# Patient Record
Sex: MALE | Race: OTHER | NOT HISPANIC OR LATINO | URBAN - METROPOLITAN AREA
[De-identification: names, ages, dates, MRNs, and addresses within clinical notes are randomized per-mention and may not be internally consistent; named-entity substitution may affect disease eponyms.]

---

## 2019-04-27 ENCOUNTER — EMERGENCY (EMERGENCY)
Facility: HOSPITAL | Age: 64
LOS: 1 days | Discharge: HOME | End: 2019-04-27
Attending: EMERGENCY MEDICINE
Payer: MEDICAID

## 2019-04-27 VITALS
RESPIRATION RATE: 16 BRPM | TEMPERATURE: 98 F | HEIGHT: 66 IN | OXYGEN SATURATION: 100 % | DIASTOLIC BLOOD PRESSURE: 91 MMHG | WEIGHT: 199.96 LBS | SYSTOLIC BLOOD PRESSURE: 158 MMHG | HEART RATE: 71 BPM

## 2019-04-27 VITALS
TEMPERATURE: 98 F | OXYGEN SATURATION: 96 % | RESPIRATION RATE: 16 BRPM | SYSTOLIC BLOOD PRESSURE: 144 MMHG | HEART RATE: 81 BPM | DIASTOLIC BLOOD PRESSURE: 87 MMHG

## 2019-04-27 PROCEDURE — 99284 EMERGENCY DEPT VISIT MOD MDM: CPT

## 2019-04-27 PROCEDURE — 99284 EMERGENCY DEPT VISIT MOD MDM: CPT | Mod: 25

## 2019-04-27 PROCEDURE — 96374 THER/PROPH/DIAG INJ IV PUSH: CPT

## 2019-04-27 RX ORDER — SODIUM CHLORIDE 9 MG/ML
1000 INJECTION, SOLUTION INTRAVENOUS ONCE
Qty: 0 | Refills: 0 | Status: COMPLETED | OUTPATIENT
Start: 2019-04-27 | End: 2019-04-27

## 2019-04-27 RX ORDER — METOCLOPRAMIDE HCL 10 MG
10 TABLET ORAL ONCE
Qty: 0 | Refills: 0 | Status: COMPLETED | OUTPATIENT
Start: 2019-04-27 | End: 2019-04-27

## 2019-04-27 RX ORDER — METOCLOPRAMIDE HCL 10 MG
1 TABLET ORAL
Qty: 15 | Refills: 0
Start: 2019-04-27 | End: 2019-05-01

## 2019-04-27 RX ORDER — MECLIZINE HCL 12.5 MG
50 TABLET ORAL ONCE
Qty: 0 | Refills: 0 | Status: COMPLETED | OUTPATIENT
Start: 2019-04-27 | End: 2019-04-27

## 2019-04-27 RX ADMIN — Medication 50 MILLIGRAM(S): at 19:42

## 2019-04-27 RX ADMIN — Medication 10 MILLIGRAM(S): at 19:42

## 2019-04-27 RX ADMIN — SODIUM CHLORIDE 1000 MILLILITER(S): 9 INJECTION, SOLUTION INTRAVENOUS at 19:42

## 2019-04-27 NOTE — ED ADULT NURSE NOTE - OBJECTIVE STATEMENT
64y male arrived to ED complaining of dizziness. Patient is Colombian speaking, prefers to have daughter translate. Patient PMHx vertigo. Patient p/w dizziness x4 hours PTA worsened with movement such as standing up. Patient endorses 1 episode of n/v. Patient A&Ox4, ambulatory, VS stable. Denies CP, SOB, diarrhea, fever, chills, ab pain, headache. Patient well appearing.

## 2019-04-27 NOTE — ED PROVIDER NOTE - OBJECTIVE STATEMENT
64 YOM pmh BPPV, p/w intermittent vertigo with movement started 4 hours ago while pt was bent over and tying his shoes. Pt endorses nausea and vomiting x 1. Pt denies fever, chills, cough, headache, chest pain, abdominal pain. Pt denies any numbness or tingling, any weakness in any extremities. Pt is ambulatory but feels very symptomatic when pt stands up or moves his head.

## 2019-04-27 NOTE — ED PROVIDER NOTE - NSFOLLOWUPCLINICS_GEN_ALL_ED_FT
[facility]  Neurology  [address] [place] [address] [address] [place] [address]  Phone: [phone]  Fax:   Follow Up Time:

## 2019-04-27 NOTE — ED PROVIDER NOTE - NEUROLOGICAL, MLM
Alert and oriented, no focal deficits, no motor or sensory deficits. CN II-XII intact, [place] hallpike positive.

## 2019-04-27 NOTE — ED PROVIDER NOTE - NSFOLLOWUPINSTRUCTIONS_ED_ALL_ED_FT
1. Return to ED for worsening, progressive or any other concerning symptoms   2. Follow up with your primary care doctor in 2-3days  3. Follow up with neurology clinic.   4. Take reglan 3 times a day as needed for vertigo symptoms.  5. Return if you develop any weakness or numbness.

## 2019-04-27 NOTE — ED PROVIDER NOTE - PROGRESS NOTE DETAILS
Addended by: Reynold Olivares on: 3/27/2017 09:12 AM     Modules accepted: Orders
symptoms improved, pt ambulatory will d/c home with neuro follow up.

## 2019-04-27 NOTE — ED PROVIDER NOTE - NS ED ROS FT
CONSTITUTIONAL: No fevers, no chills  Eyes: no visual changes  Ears: no ear drainage, no ear pain  Nose: no nasal congestion  Mouth/Throat: no sore throat  Cardiovascular: No Chest pain  Respiratory: No SOB  Gastrointestinal: No n/v/d, no abd pain  Genitourinary: no dysuria, no hematuria  SKIN: no rashes.  NEURO: +vertigo   PSYCHIATRIC: no known mental health issues.

## 2019-04-27 NOTE — ED PROVIDER NOTE - CLINICAL SUMMARY MEDICAL DECISION MAKING FREE TEXT BOX
Pt with h/o BPV has recurrence had recent neuro eval eith CT head neg Pt with positional vomiting and dizziness otherwise normal exam improved with treatment in ED ambulatory No vomiting will have out patient PMD f/u --Damon

## 2019-04-27 NOTE — ED PROVIDER NOTE - ATTENDING CONTRIBUTION TO CARE
I have seen and evaluated this patient with the resident.   I agree with the findings  unless other wise stated.  I have made appropriate changes in documentations where needed, After my face to face bedside evaluation, I am further  notin YOM pmh BPPV, p/w intermittent vertigo with movement started 4 hours ago while pt was bent over and tying his shoes. Pt endorses nausea and vomiting x 1. Pt denies fever, chills, cough, headache, chest pain, abdominal pain. Pt denies any numbness or tingling, any weakness in any extremities. Pt is ambulatory but feels very symptomatic when pt stands up or moves his head. Pt with h/o BPV has recurrence had recent neuro eval eith CT head neg Pt with positional vomiting and dizziness otherwise normal exam improved with treatment in ED ambulatory No vomiting will have out patient PMD f/u --Damon

## 2019-04-27 NOTE — ED ADULT NURSE NOTE - NSIMPLEMENTINTERV_GEN_ALL_ED
Implemented All Universal Safety Interventions:  [place] to call system. Call bell, personal items and telephone within reach. Instruct patient to call for assistance. Room bathroom lighting operational. Non-slip footwear when patient is off stretcher. Physically safe environment: no spills, clutter or unnecessary equipment. Stretcher in lowest position, wheels locked, appropriate side rails in place.

## 2019-06-24 PROBLEM — R42 DIZZINESS AND GIDDINESS: Chronic | Status: ACTIVE | Noted: 2019-04-27

## 2019-06-25 PROBLEM — Z00.00 ENCOUNTER FOR PREVENTIVE HEALTH EXAMINATION: Status: ACTIVE | Noted: 2019-06-25

## 2019-07-01 ENCOUNTER — OUTPATIENT (OUTPATIENT)
Dept: OUTPATIENT SERVICES | Facility: HOSPITAL | Age: 64
LOS: 1 days | End: 2019-07-01
Payer: MEDICAID

## 2019-07-01 PROCEDURE — G9001: CPT

## 2019-07-02 ENCOUNTER — APPOINTMENT (OUTPATIENT)
Dept: CARDIOLOGY | Facility: CLINIC | Age: 64
End: 2019-07-02
Payer: MEDICAID

## 2019-07-02 PROCEDURE — 99214 OFFICE O/P EST MOD 30 MIN: CPT | Mod: 25

## 2019-07-02 PROCEDURE — 93000 ELECTROCARDIOGRAM COMPLETE: CPT

## 2019-07-12 ENCOUNTER — OUTPATIENT (OUTPATIENT)
Dept: OUTPATIENT SERVICES | Facility: HOSPITAL | Age: 64
LOS: 1 days | Discharge: HOME | End: 2019-07-12
Payer: MEDICAID

## 2019-07-12 DIAGNOSIS — R07.9 CHEST PAIN, UNSPECIFIED: ICD-10-CM

## 2019-07-12 PROCEDURE — 78452 HT MUSCLE IMAGE SPECT MULT: CPT | Mod: 26

## 2019-07-15 ENCOUNTER — APPOINTMENT (OUTPATIENT)
Dept: CARDIOLOGY | Facility: CLINIC | Age: 64
End: 2019-07-15
Payer: MEDICAID

## 2019-07-15 PROCEDURE — 93306 TTE W/DOPPLER COMPLETE: CPT

## 2019-07-22 ENCOUNTER — INPATIENT (INPATIENT)
Facility: HOSPITAL | Age: 64
LOS: 6 days | Discharge: HOME HEALTH | End: 2019-07-29
Attending: THORACIC SURGERY (CARDIOTHORACIC VASCULAR SURGERY) | Admitting: THORACIC SURGERY (CARDIOTHORACIC VASCULAR SURGERY)
Payer: MEDICAID

## 2019-07-22 VITALS
RESPIRATION RATE: 12 BRPM | DIASTOLIC BLOOD PRESSURE: 71 MMHG | HEART RATE: 58 BPM | WEIGHT: 199.96 LBS | OXYGEN SATURATION: 97 % | SYSTOLIC BLOOD PRESSURE: 113 MMHG | HEIGHT: 65.98 IN

## 2019-07-22 LAB
ABO RH CONFIRMATION: SIGNIFICANT CHANGE UP
ALBUMIN SERPL ELPH-MCNC: 4 G/DL — SIGNIFICANT CHANGE UP (ref 3.5–5.2)
ALP SERPL-CCNC: 102 U/L — SIGNIFICANT CHANGE UP (ref 30–115)
ALT FLD-CCNC: 24 U/L — SIGNIFICANT CHANGE UP (ref 0–41)
ANION GAP SERPL CALC-SCNC: 10 MMOL/L — SIGNIFICANT CHANGE UP (ref 7–14)
APTT BLD: 47 SEC — HIGH (ref 27–39.2)
AST SERPL-CCNC: 20 U/L — SIGNIFICANT CHANGE UP (ref 0–41)
BILIRUB SERPL-MCNC: 0.9 MG/DL — SIGNIFICANT CHANGE UP (ref 0.2–1.2)
BLD GP AB SCN SERPL QL: SIGNIFICANT CHANGE UP
BUN SERPL-MCNC: 13 MG/DL — SIGNIFICANT CHANGE UP (ref 10–20)
CALCIUM SERPL-MCNC: 9 MG/DL — SIGNIFICANT CHANGE UP (ref 8.5–10.1)
CHLORIDE SERPL-SCNC: 105 MMOL/L — SIGNIFICANT CHANGE UP (ref 98–110)
CO2 SERPL-SCNC: 24 MMOL/L — SIGNIFICANT CHANGE UP (ref 17–32)
CREAT SERPL-MCNC: 1 MG/DL — SIGNIFICANT CHANGE UP (ref 0.7–1.5)
ESTIMATED AVERAGE GLUCOSE: 103 MG/DL — SIGNIFICANT CHANGE UP (ref 68–114)
GLUCOSE SERPL-MCNC: 100 MG/DL — HIGH (ref 70–99)
HBA1C BLD-MCNC: 5.2 % — SIGNIFICANT CHANGE UP (ref 4–5.6)
HCT VFR BLD CALC: 47.1 % — SIGNIFICANT CHANGE UP (ref 42–52)
HGB BLD-MCNC: 15.7 G/DL — SIGNIFICANT CHANGE UP (ref 14–18)
INR BLD: 1.27 RATIO — SIGNIFICANT CHANGE UP (ref 0.65–1.3)
MCHC RBC-ENTMCNC: 30.4 PG — SIGNIFICANT CHANGE UP (ref 27–31)
MCHC RBC-ENTMCNC: 33.3 G/DL — SIGNIFICANT CHANGE UP (ref 32–37)
MCV RBC AUTO: 91.1 FL — SIGNIFICANT CHANGE UP (ref 80–94)
MRSA PCR RESULT.: NEGATIVE — SIGNIFICANT CHANGE UP
NRBC # BLD: 0 /100 WBCS — SIGNIFICANT CHANGE UP (ref 0–0)
NT-PROBNP SERPL-SCNC: 180 PG/ML — SIGNIFICANT CHANGE UP (ref 0–300)
PLATELET # BLD AUTO: 220 K/UL — SIGNIFICANT CHANGE UP (ref 130–400)
POTASSIUM SERPL-MCNC: 4 MMOL/L — SIGNIFICANT CHANGE UP (ref 3.5–5)
POTASSIUM SERPL-SCNC: 4 MMOL/L — SIGNIFICANT CHANGE UP (ref 3.5–5)
PROT SERPL-MCNC: 6.8 G/DL — SIGNIFICANT CHANGE UP (ref 6–8)
PROTHROM AB SERPL-ACNC: 14.6 SEC — HIGH (ref 9.95–12.87)
RBC # BLD: 5.17 M/UL — SIGNIFICANT CHANGE UP (ref 4.7–6.1)
RBC # FLD: 11.7 % — SIGNIFICANT CHANGE UP (ref 11.5–14.5)
SODIUM SERPL-SCNC: 139 MMOL/L — SIGNIFICANT CHANGE UP (ref 135–146)
WBC # BLD: 9.79 K/UL — SIGNIFICANT CHANGE UP (ref 4.8–10.8)
WBC # FLD AUTO: 9.79 K/UL — SIGNIFICANT CHANGE UP (ref 4.8–10.8)

## 2019-07-22 PROCEDURE — 93880 EXTRACRANIAL BILAT STUDY: CPT | Mod: 26

## 2019-07-22 PROCEDURE — 70450 CT HEAD/BRAIN W/O DYE: CPT | Mod: 26

## 2019-07-22 PROCEDURE — 93306 TTE W/DOPPLER COMPLETE: CPT | Mod: 26

## 2019-07-22 PROCEDURE — 99222 1ST HOSP IP/OBS MODERATE 55: CPT | Mod: 57

## 2019-07-22 PROCEDURE — 71045 X-RAY EXAM CHEST 1 VIEW: CPT | Mod: 26

## 2019-07-22 PROCEDURE — 93458 L HRT ARTERY/VENTRICLE ANGIO: CPT | Mod: 26

## 2019-07-22 PROCEDURE — 71250 CT THORAX DX C-: CPT | Mod: 26

## 2019-07-22 RX ORDER — SODIUM CHLORIDE 9 MG/ML
1000 INJECTION INTRAMUSCULAR; INTRAVENOUS; SUBCUTANEOUS
Refills: 0 | Status: DISCONTINUED | OUTPATIENT
Start: 2019-07-22 | End: 2019-07-23

## 2019-07-22 RX ORDER — METOPROLOL TARTRATE 50 MG
12.5 TABLET ORAL ONCE
Refills: 0 | Status: COMPLETED | OUTPATIENT
Start: 2019-07-23 | End: 2019-07-23

## 2019-07-22 RX ORDER — ALBUMIN HUMAN 25 %
3000 VIAL (ML) INTRAVENOUS ONCE
Refills: 0 | Status: DISCONTINUED | OUTPATIENT
Start: 2019-07-23 | End: 2019-07-27

## 2019-07-22 RX ORDER — SODIUM CHLORIDE 9 MG/ML
3 INJECTION INTRAMUSCULAR; INTRAVENOUS; SUBCUTANEOUS EVERY 8 HOURS
Refills: 0 | Status: DISCONTINUED | OUTPATIENT
Start: 2019-07-22 | End: 2019-07-23

## 2019-07-22 RX ORDER — PANTOPRAZOLE SODIUM 20 MG/1
40 TABLET, DELAYED RELEASE ORAL
Refills: 0 | Status: DISCONTINUED | OUTPATIENT
Start: 2019-07-22 | End: 2019-07-23

## 2019-07-22 RX ORDER — ATORVASTATIN CALCIUM 80 MG/1
20 TABLET, FILM COATED ORAL AT BEDTIME
Refills: 0 | Status: DISCONTINUED | OUTPATIENT
Start: 2019-07-22 | End: 2019-07-23

## 2019-07-22 RX ORDER — CHLORHEXIDINE GLUCONATE 213 G/1000ML
1 SOLUTION TOPICAL ONCE
Refills: 0 | Status: COMPLETED | OUTPATIENT
Start: 2019-07-22 | End: 2019-07-22

## 2019-07-22 RX ORDER — ASPIRIN/CALCIUM CARB/MAGNESIUM 324 MG
325 TABLET ORAL DAILY
Refills: 0 | Status: DISCONTINUED | OUTPATIENT
Start: 2019-07-22 | End: 2019-07-23

## 2019-07-22 RX ORDER — LANOLIN ALCOHOL/MO/W.PET/CERES
5 CREAM (GRAM) TOPICAL AT BEDTIME
Refills: 0 | Status: DISCONTINUED | OUTPATIENT
Start: 2019-07-22 | End: 2019-07-23

## 2019-07-22 RX ORDER — CHLORHEXIDINE GLUCONATE 213 G/1000ML
15 SOLUTION TOPICAL ONCE
Refills: 0 | Status: COMPLETED | OUTPATIENT
Start: 2019-07-22 | End: 2019-07-23

## 2019-07-22 RX ORDER — DOCUSATE SODIUM 100 MG
100 CAPSULE ORAL THREE TIMES A DAY
Refills: 0 | Status: DISCONTINUED | OUTPATIENT
Start: 2019-07-22 | End: 2019-07-23

## 2019-07-22 RX ADMIN — SODIUM CHLORIDE 3 MILLILITER(S): 9 INJECTION INTRAMUSCULAR; INTRAVENOUS; SUBCUTANEOUS at 23:02

## 2019-07-22 RX ADMIN — CHLORHEXIDINE GLUCONATE 1 APPLICATION(S): 213 SOLUTION TOPICAL at 23:11

## 2019-07-22 RX ADMIN — Medication 5 MILLIGRAM(S): at 23:36

## 2019-07-22 RX ADMIN — ATORVASTATIN CALCIUM 20 MILLIGRAM(S): 80 TABLET, FILM COATED ORAL at 23:36

## 2019-07-22 RX ADMIN — Medication 100 MILLIGRAM(S): at 23:36

## 2019-07-22 NOTE — PRE-ANESTHESIA EVALUATION ADULT - NSRADCARDRESULTSFT_GEN_ALL_CORE
Cardiac Cath: Left main 71%, ostial LAD 90%, mid LAD 90%, moderate diagonal disease  CX 80%, OM1 90%, LPLV 95%    Nuc stress + with EF of 55%    EKG -  NSR    TTE: pending Cardiac Cath: Left main 71%, ostial LAD 90%, mid LAD 90%, moderate diagonal disease  CX 80%, OM1 90%, LPLV 95%    Nuc stress + with EF of 55%    EKG -  NSR

## 2019-07-22 NOTE — CONSULT NOTE ADULT - SUBJECTIVE AND OBJECTIVE BOX
Surgeon: /Jenise/ Levy    Consult requesting by: Earnest GALE, cardiologist; MD [place] primary    HISTORY OF PRESENT ILLNESS:  64y Male with PMH of htn, BPPV presented today for elective cardiac catheterization secondary to positive nuclear stress test done for intermittent chest pain and dizziness. No chest pain now or diaphoresis.  CC revealed left main with multi-vessel CAD. CTS consulted for CABG evaluation    NYHA functional class    [ ] Class I (no limitation) [ X] Class II (slight limitation) [ ] Class III (marked limitation) [ ] Class IV (symptoms at rest)    PAST MEDICAL & SURGICAL HISTORY:  Vertigo  HTN  Cholecystectomy    Home medications:  metoprolol succinate 50 mg QD  Plavix 75 mg QD  Atorvastatin 20 mg QD      MEDICATIONS  (STANDING):    MEDICATIONS  (PRN):    Antiplatelet therapy:                           Last dose/amt:    Allergies    No Known Allergies    Intolerances    SOCIAL HISTORY:  Smoker: [ ] Yes  stopped 10 years ago  ETOH use: [ ] Yes social  Ilicit Drug use:   [ ] No  Lives with: family  Assisted device use: no  5 meter walk test: 1____sec, 2____sec, 3___sec just cathed  FAMILY HISTORY:  No pertinent family history in first degree relatives      Review of Systems  CONSTITUTIONAL: no fever, chills or night sweats]   NEURO:  denies seizures, paralysis or paresthesias                                                                                EYES: wears glasses, no double vision, no blurry vision                                                                          ENMT: no difficulty hearing, vertigo, dysphagia,epistaxis recent dental work [ ]                                      CV:  denies chest pain, RODRIGUEZ or palpatations at current time                                                                                            RESPIRATORY:  no cough or hemoptysis                                                                 GI:  no nausea, vomiting, constipation or diarrhea   : denies dysuria, hematuria, incontinence or retention                                                                                           MUSKULOSKELETAL:  denies joint swelling or muscle weakness, + discomfort right leg with prolonged walking below knee  PSYCH:  denies dementia, depresion, anxiety                                                                                                                                                                                               PHYSICAL EXAM  Vital Signs Last 24 Hrs  HR: -- 58  BP: -- 113/71  RR: --12  SpO2: --  97% room air  Right radial catheterization    CONSTITUTIONAL:    well nourished, well developed, overweight in NAD                                                                       Neuro: oriented to person/place & time with no focal motor or sensory  deficits     Eyes: PERRLA, EOMI, no nystagmus, sclera anicteric  ENT:  nasal/oral mucosa with absence of cyanosis, fair dentition  Neck: no jugular vein distention, trachea midline, no goiter   Chest: bilatertal breath sounds with good air movement absence of wheezes, rales, or rhonchi                                                                           CV:  RSR, S1S2, no significant murmur appreciated  Carotids: No Bruits  GI:  soft, non-tender non-distended, + bowel sounds                                                                                                          Extremities:  no evidence of cyanosis or deformity, no pedal edema Edema  Extremity Pulses: right / left:  femorals 2+/ 2+; DP's 2+/2+; radials pressure dressing in place /2+ negative Allens  SKIN : no rashes                                                          LABS:                        10.4   6.75  )-----------( 139      ( 22 Jul 2019 07:29 )             32.2     /4.4/106/27/18/1.1/105  LFT wnl    Cardiac Cath: Left main 71%, ostial LAD 90%, mid LAD 90%, moderate diagonal disease  CX 80%, OM1 90%, LPLV 95%    Nuc stress + with EF of 55%    EKG -  NSR    TTE: pending    Recommendation: (Procedures/Evaluations)  CT HEAD Non-Contrast  CT Chest without contrast  Carotid Duplex   PFT : Simple PFT     STS Score:   Isolated CAB CALCULATE   Risk of Mortality: 	0.559% 	  Renal Failure: 	0.597% 	  Permanent Stroke: 	0.683% 	  Prolonged Ventilation: 	3.358% 	  DSW Infection: 	0.177% 	  Reoperation: 	1.368% 	  Morbidity or Mortality: 	5.309% 	  Short Length of Stay: 	66.918% 	  Long Length of Stay: 	1.643	      Impression: 63 yo male with left main CAD and multi-vessel disease who requires myocardial revascularization via CABG    CAD [ ]    Assessment/ Plan:    Pre-op for CABG possibly tomorrow  Verify Now 269 Surgeon: /Jenise/ Levy    Consult requesting by: Earnest GALE, cardiologist; MD [place] primary    HISTORY OF PRESENT ILLNESS:  64y Male with PMH of htn, BPPV presented today for elective cardiac catheterization secondary to positive nuclear stress test done for intermittent chest pain and dizziness. No chest pain now or diaphoresis.  CC revealed left main with multi-vessel CAD. CTS consulted for CABG evaluation    NYHA functional class    [ ] Class I (no limitation) [ X] Class II (slight limitation) [ ] Class III (marked limitation) [ ] Class IV (symptoms at rest)    PAST MEDICAL & SURGICAL HISTORY:  Vertigo  HTN  Cholecystectomy    Home medications:  metoprolol succinate 50 mg QD  Plavix 75 mg QD  Atorvastatin 20 mg QD      MEDICATIONS  (STANDING):    MEDICATIONS  (PRN):    Antiplatelet therapy:    Plavix   today                       Last dose/amt:    Allergies    No Known Allergies    Intolerances    SOCIAL HISTORY:  Smoker: [ ] Yes  stopped 10 years ago  ETOH use: [ ] Yes social  Ilicit Drug use:   [ ] No  Lives with: family  Assisted device use: no  5 meter walk test: 1_4.7_sec, 2__4.9__sec, 3_4.8__sec just   FAMILY HISTORY:  No pertinent family history in first degree relatives      Review of Systems  CONSTITUTIONAL: no fever, chills or night sweats]   NEURO:  denies seizures, paralysis or paresthesias                                                                                EYES: wears glasses, no double vision, no blurry vision                                                                          ENMT: no difficulty hearing, vertigo, dysphagia,epistaxis recent dental work [ ]                                      CV:  denies chest pain, RODRIGUEZ or palpatations at current time                                                                                            RESPIRATORY:  no cough or hemoptysis                                                                 GI:  no nausea, vomiting, constipation or diarrhea   : denies dysuria, hematuria, incontinence or retention                                                                                           MUSKULOSKELETAL:  denies joint swelling or muscle weakness, + discomfort right leg with prolonged walking below knee  PSYCH:  denies dementia, depresion, anxiety                                                                                                                                                                                               PHYSICAL EXAM  Vital Signs Last 24 Hrs  HR: -- 58  BP: -- 113/71  RR: --12  SpO2: --  97% room air  Right radial catheterization    CONSTITUTIONAL:    well nourished, well developed, overweight in NAD                                                                       Neuro: oriented to person/place & time with no focal motor or sensory  deficits     Eyes: PERRLA, EOMI, no nystagmus, sclera anicteric  ENT:  nasal/oral mucosa with absence of cyanosis, fair dentition, gold fillings  Neck: no jugular vein distention, trachea midline, no goiter   Chest: bilateral breath sounds with good air movement absence of wheezes, rales, or rhonchi                                                                           CV:  RSR, S1S2, no significant murmur appreciated  Carotids: No Bruits  GI:  soft, non-tender non-distended, + bowel sounds, scar right upper abdomen                                                                                                        Extremities:  no evidence of cyanosis or deformity, no pedal edema   Extremity Pulses: right / left:  femorals 2+/ 2+; DP's 2+/2+; radials pressure dressing in place /2+   negative Allens  SKIN : no rashes, tattoo, left hand                                                          LABS:                        10.4   6.75  )-----------( 139      ( 22 Jul 2019 07:29 )             32.2     /4.4/106/27/18/1.1/105  LFT wnl    Cardiac Cath: Left main 71%, ostial LAD 90%, mid LAD 90%, moderate diagonal disease  CX 80%, OM1 90%, LPLV 95%    Nuc stress + with EF of 55%    EKG -  NSR    TTE: pending    Recommendation: (Procedures/Evaluations)  CT HEAD Non-Contrast  CT Chest without contrast  Carotid Duplex   PFT : Simple PFT     STS Score:   Isolated CAB CALCULATE   Risk of Mortality: 	0.559% 	  Renal Failure: 	0.597% 	  Permanent Stroke: 	0.683% 	  Prolonged Ventilation: 	3.358% 	  DSW Infection: 	0.177% 	  Reoperation: 	1.368% 	  Morbidity or Mortality: 	5.309% 	  Short Length of Stay: 	66.918% 	  Long Length of Stay: 	1.643	      Impression: 63 yo male with left main CAD and multi-vessel disease who requires myocardial revascularization via CABG    CAD [ ]    Assessment/ Plan:    Pre-op for CABG possibly tomorrow  Verify Now 269 Surgeon: /Jenise/ Levy    Consult requesting by: Earnest GALE, cardiologist; MD [place] primary    HISTORY OF PRESENT ILLNESS:  64y Male with PMH of htn, BPPV presented today for elective cardiac catheterization secondary to positive nuclear stress test done for intermittent chest pain and dizziness. No chest pain now or diaphoresis.  CC revealed left main with multi-vessel CAD. CTS consulted for CABG evaluation    NYHA functional class    [ ] Class I (no limitation) [ X] Class II (slight limitation) [ ] Class III (marked limitation) [ ] Class IV (symptoms at rest)    PAST MEDICAL & SURGICAL HISTORY:  Vertigo  HTN  Cholecystectomy    Home medications:  metoprolol succinate 50 mg QD  Plavix 75 mg QD  Atorvastatin 20 mg QD      MEDICATIONS  (STANDING):    MEDICATIONS  (PRN):    Antiplatelet therapy:    Plavix   today                       Last dose/amt:    Allergies    No Known Allergies    Intolerances    SOCIAL HISTORY:  Smoker: [ ] Yes  stopped 10 years ago  ETOH use: [ ] Yes social  Ilicit Drug use:   [ ] No  Lives with: family;  Assisted device use: no  5 meter walk test: 1_4.7_sec, 2__4.9__sec, 3_4.8__sec   FAMILY HISTORY:  No pertinent family history in first degree relatives      Review of Systems  CONSTITUTIONAL: no fever, chills or night sweats]   NEURO:  denies seizures, paralysis or paresthesias                                                                                EYES: wears glasses, no double vision, no blurry vision                                                                          ENMT: no difficulty hearing, vertigo, dysphagia,epistaxis recent dental work [ ]                                      CV:  denies chest pain, RODRIGUEZ or palpatations at current time                                                                                            RESPIRATORY:  no cough or hemoptysis                                                                 GI:  no nausea, vomiting, constipation or diarrhea   : denies dysuria, hematuria, incontinence or retention                                                                                           MUSKULOSKELETAL:  denies joint swelling or muscle weakness, + discomfort right leg with prolonged walking below knee  PSYCH:  denies dementia, depresion, anxiety                                                                                                                                                                                               PHYSICAL EXAM  Vital Signs Last 24 Hrs  HR: -- 58  BP: -- 113/71  RR: --12  SpO2: --  97% room air  Right radial catheterization    CONSTITUTIONAL:    well nourished, well developed, overweight in NAD                                                                       Neuro: oriented to person/place & time with no focal motor or sensory  deficits     Eyes: PERRLA, EOMI, no nystagmus, sclera anicteric  ENT:  nasal/oral mucosa with absence of cyanosis, fair dentition, gold fillings / teeth  Neck: no jugular vein distention, trachea midline, no goiter   Chest: bilateral breath sounds with good air movement absence of wheezes, rales, or rhonchi                                                                           CV:  RSR, S1S2, no significant murmur appreciated  GI:  soft, non-tender non-distended, + bowel sounds, scar right upper abdomen                                                                                                        Extremities:  no evidence of cyanosis or deformity, no pedal edema   Extremity Pulses: right / left:  femorals 2+/ 2+; DP's 2+/2+; radials pressure dressing in place /2+   negative Allens  SKIN : no rashes, tattoo, left hand                                                          LABS:                        10.4   6.75  )-----------( 139      ( 22 Jul 2019 07:29 )             32.2     /4.4/106/27/18/1.1/105  LFT wnl    LEFT HEART CATHETERIZATION:       7/22/19                             Left main: 70% distal lesion    LAD:  90% ostial lesion, 90% mid-LAD lesion.               Diag: Moderate disease    Left Circumflex: Large, dominant. 80% ostial, 80% mid-segment lesion.  OM1: 90% lesion  LPDA: 95% lesion    Right Coronary Artery: Small, non-dominant.    NM Nuclear Stress Multiple (07.12.19 @ 10:16) >  Impression:  1. Positive stress test for exercise induced ischemia with respect to   symptoms at a moderate workload.  2. Positive stress test for exercise induced ischemia with respect to   electrocardiographic changes at a moderate workload.  3. Myocardial imaging reveals evidence of ischemia in the territories of   the left anterior descending coronary artery and right coronary artery.  4. Gated imaging reveals mild hypokinesis of the inferior and   inferoseptal walls, consistent with myocardial stunning. The overall left   ventricular systolic function was normal.    EKG -  NSR    Transthoracic Echocardiogram (07.22.19 @ 12:09) >  Summary:   1. Basal inferolateral segment is abnormal as described above.   2. LV Ejection Fraction by Rosas's Method with a biplane EF of 54 %.   3. Spectral Doppler shows impaired relaxation pattern of left   ventricular myocardial filling (Grade I diastolic dysfunction).   4. Mild aortic regurgitation.    CT HEAD Non-Contrast:  (07.22.19 @ 14:53) >  IMPRESSION:    No evidence of acute intracranial pathology.    CT Chest without contrast:  (07.22.19 @ 14:55) >  IMPRESSION:  1.  No CT evidence of an acute intrathoracicabnormality.    2.  Atherosclerotic coronary artery calcifications noted.    Carotid Duplex:  (07.22.19 @ 13:13) >  Impression:    Less than 50% stenosis of the right internal carotid artery.  Less than 50% stenosis of the left internal carotid artery.    PFT : Simple PFT: FEV 1 > 90%, > 3 liters    STS Score:   Isolated CAB CALCULATE   Risk of Mortality: 	0.559% 	  Renal Failure: 	0.597% 	  Permanent Stroke: 	0.683% 	  Prolonged Ventilation: 	3.358% 	  DSW Infection: 	0.177% 	  Reoperation: 	1.368% 	  Morbidity or Mortality: 	5.309% 	  Short Length of Stay: 	66.918% 	  Long Length of Stay: 	1.643	    Impression: 63 yo male with left main CAD and multi-vessel disease who requires myocardial revascularization via CABG    CAD [ ]    Assessment/ Plan:    Pre-op for CABG possibly tomorrow  Verify Now 248

## 2019-07-22 NOTE — H&P CARDIOLOGY - HISTORY OF PRESENT ILLNESS
64 male with HTN presenting for elective LHC. Positive stress test, as per patient's daughter he could not finish a treadmill stress test.

## 2019-07-22 NOTE — CONSULT NOTE ADULT - ATTENDING COMMENTS
64y Male with PMH of htn, BPPV presented today for elective cardiac catheterization secondary to positive nuclear stress test done for intermittent chest pain and dizziness.   CC revealed left main with multi-vessel CAD. CTS consulted for CABG evaluation  pt is recommended to undergo surgical revascularization due to diffuse nature of his disease  preop w/u in progress  verify now is 248  will prepare patient for surgery tomorrow  spent time disclosing to patient risks and benefits associated with surgery

## 2019-07-22 NOTE — CHART NOTE - NSCHARTNOTEFT_GEN_A_CORE
PRE-OP DIAGNOSIS: suspected CAD, positive stress test    PROCEDURE:  [x] C with coronary angiography                           [ ] [facility]  Physician: Dr Tinoco  Assistant: Maddison Vasquez    ANESTHESIA TYPE:  [  ]General Anesthesia  [x] Sedation  [ x ] Local/Regional    ESTIMATED BLOOD LOSS:    10   mL    CONDITION  [  ] Critical  [  ] Serious  [  ]Fair  [ x]Good      SPECIMENS REMOVED (IF APPLICABLE):      IV CONTRAST:      25       mL      IMPLANTS (IF APPLICABLE)      ACCESS:    [x] right radial artery  [ ] right femoral artery    LEFT HEART CATHETERIZATION:                                    Left main: 70% diffuse lesion    LAD:  90% ostial lesion, 90% mid-LAD lesion.               Diag: Moderate disease    Left Circumflex: Large, dominant. 90% ostial, 80% mid-segment lesion.  OM1: 90% lesion  LPDA: 95% lesion    Right Coronary Artery: Small, non-dominant.      POST-OP DIAGNOSIS    Significant LM, LCx and LAD disease      PLAN OF CARE  [ ] D/C Home today  [ ]  D/C in AM  [ ] Return to In-patient bed  [ ] Admit for observation  [ ] Return for staged procedure:  [x ] CT Surgery consult called  [ ]  Continue DAPT, B-blocker & Statin therapy PRE-OP DIAGNOSIS: suspected CAD, positive stress test    PROCEDURE:  [x] C with coronary angiography                           [ ] [facility]  Physician: Dr Tinoco  Assistant: Maddison Vasquez    ANESTHESIA TYPE:  [  ]General Anesthesia  [x] Sedation  [ x ] Local/Regional    ESTIMATED BLOOD LOSS:    10   mL    CONDITION  [  ] Critical  [  ] Serious  [  ]Fair  [ x]Good      SPECIMENS REMOVED (IF APPLICABLE):      IV CONTRAST:      25       mL      IMPLANTS (IF APPLICABLE)      ACCESS:    [x] right radial artery  [ ] right femoral artery    LEFT HEART CATHETERIZATION:                                    Left main: 70% distal lesion    LAD:  90% ostial lesion, 90% mid-LAD lesion.               Diag: Moderate disease    Left Circumflex: Large, dominant. 80% ostial, 80% mid-segment lesion.  OM1: 90% lesion  LPDA: 95% lesion    Right Coronary Artery: Small, non-dominant.      POST-OP DIAGNOSIS    Significant LM, LCx and LAD disease      PLAN OF CARE  [ ] D/C Home today  [ ]  D/C in AM  [ ] Return to In-patient bed  [ ] Admit for observation  [ ] Return for staged procedure:  [x ] CT Surgery consult called  [ ]  Continue DAPT, B-blocker & Statin therapy

## 2019-07-23 ENCOUNTER — TRANSCRIPTION ENCOUNTER (OUTPATIENT)
Age: 64
End: 2019-07-23

## 2019-07-23 DIAGNOSIS — Z71.89 OTHER SPECIFIED COUNSELING: ICD-10-CM

## 2019-07-23 LAB
ALBUMIN SERPL ELPH-MCNC: 3.6 G/DL — SIGNIFICANT CHANGE UP (ref 3.5–5.2)
ALBUMIN SERPL ELPH-MCNC: 3.8 G/DL — SIGNIFICANT CHANGE UP (ref 3.5–5.2)
ALP SERPL-CCNC: 100 U/L — SIGNIFICANT CHANGE UP (ref 30–115)
ALP SERPL-CCNC: 67 U/L — SIGNIFICANT CHANGE UP (ref 30–115)
ALT FLD-CCNC: 20 U/L — SIGNIFICANT CHANGE UP (ref 0–41)
ALT FLD-CCNC: 24 U/L — SIGNIFICANT CHANGE UP (ref 0–41)
ANION GAP SERPL CALC-SCNC: 10 MMOL/L — SIGNIFICANT CHANGE UP (ref 7–14)
ANION GAP SERPL CALC-SCNC: 12 MMOL/L — SIGNIFICANT CHANGE UP (ref 7–14)
APPEARANCE UR: CLEAR — SIGNIFICANT CHANGE UP
APTT BLD: 27.9 SEC — SIGNIFICANT CHANGE UP (ref 27–39.2)
AST SERPL-CCNC: 20 U/L — SIGNIFICANT CHANGE UP (ref 0–41)
AST SERPL-CCNC: 38 U/L — SIGNIFICANT CHANGE UP (ref 0–41)
BASE EXCESS BLDA CALC-SCNC: -2.9 MMOL/L — LOW (ref -2–2)
BASOPHILS # BLD AUTO: 0.04 K/UL — SIGNIFICANT CHANGE UP (ref 0–0.2)
BASOPHILS NFR BLD AUTO: 0.2 % — SIGNIFICANT CHANGE UP (ref 0–1)
BILIRUB SERPL-MCNC: 1 MG/DL — SIGNIFICANT CHANGE UP (ref 0.2–1.2)
BILIRUB SERPL-MCNC: 1.1 MG/DL — SIGNIFICANT CHANGE UP (ref 0.2–1.2)
BILIRUB UR-MCNC: NEGATIVE — SIGNIFICANT CHANGE UP
BUN SERPL-MCNC: 14 MG/DL — SIGNIFICANT CHANGE UP (ref 10–20)
BUN SERPL-MCNC: 14 MG/DL — SIGNIFICANT CHANGE UP (ref 10–20)
CALCIUM SERPL-MCNC: 8.1 MG/DL — LOW (ref 8.5–10.1)
CALCIUM SERPL-MCNC: 8.6 MG/DL — SIGNIFICANT CHANGE UP (ref 8.5–10.1)
CHLORIDE SERPL-SCNC: 105 MMOL/L — SIGNIFICANT CHANGE UP (ref 98–110)
CHLORIDE SERPL-SCNC: 107 MMOL/L — SIGNIFICANT CHANGE UP (ref 98–110)
CO2 SERPL-SCNC: 21 MMOL/L — SIGNIFICANT CHANGE UP (ref 17–32)
CO2 SERPL-SCNC: 22 MMOL/L — SIGNIFICANT CHANGE UP (ref 17–32)
COLOR SPEC: YELLOW — SIGNIFICANT CHANGE UP
CREAT SERPL-MCNC: 1 MG/DL — SIGNIFICANT CHANGE UP (ref 0.7–1.5)
CREAT SERPL-MCNC: 1 MG/DL — SIGNIFICANT CHANGE UP (ref 0.7–1.5)
DIFF PNL FLD: NEGATIVE — SIGNIFICANT CHANGE UP
EOSINOPHIL # BLD AUTO: 0.06 K/UL — SIGNIFICANT CHANGE UP (ref 0–0.7)
EOSINOPHIL NFR BLD AUTO: 0.4 % — SIGNIFICANT CHANGE UP (ref 0–8)
GAS PNL BLDA: SIGNIFICANT CHANGE UP
GLUCOSE BLDC GLUCOMTR-MCNC: 128 MG/DL — HIGH (ref 70–99)
GLUCOSE BLDC GLUCOMTR-MCNC: 130 MG/DL — HIGH (ref 70–99)
GLUCOSE SERPL-MCNC: 103 MG/DL — HIGH (ref 70–99)
GLUCOSE SERPL-MCNC: 122 MG/DL — HIGH (ref 70–99)
GLUCOSE UR QL: NEGATIVE — SIGNIFICANT CHANGE UP
HCO3 BLDA-SCNC: 21 MMOL/L — LOW (ref 23–27)
HCT VFR BLD CALC: 34 % — LOW (ref 42–52)
HCT VFR BLD CALC: 34.4 % — LOW (ref 42–52)
HGB BLD-MCNC: 11.4 G/DL — LOW (ref 14–18)
HGB BLD-MCNC: 11.5 G/DL — LOW (ref 14–18)
HOROWITZ INDEX BLDA+IHG-RTO: 28 — SIGNIFICANT CHANGE UP
IMM GRANULOCYTES NFR BLD AUTO: 0.7 % — HIGH (ref 0.1–0.3)
INR BLD: 1.51 RATIO — HIGH (ref 0.65–1.3)
KETONES UR-MCNC: SIGNIFICANT CHANGE UP
LEUKOCYTE ESTERASE UR-ACNC: NEGATIVE — SIGNIFICANT CHANGE UP
LYMPHOCYTES # BLD AUTO: 1.58 K/UL — SIGNIFICANT CHANGE UP (ref 1.2–3.4)
LYMPHOCYTES # BLD AUTO: 9.8 % — LOW (ref 20.5–51.1)
MAGNESIUM SERPL-MCNC: 3.4 MG/DL — CRITICAL HIGH (ref 1.8–2.4)
MCHC RBC-ENTMCNC: 30.5 PG — SIGNIFICANT CHANGE UP (ref 27–31)
MCHC RBC-ENTMCNC: 31 PG — SIGNIFICANT CHANGE UP (ref 27–31)
MCHC RBC-ENTMCNC: 33.1 G/DL — SIGNIFICANT CHANGE UP (ref 32–37)
MCHC RBC-ENTMCNC: 33.8 G/DL — SIGNIFICANT CHANGE UP (ref 32–37)
MCV RBC AUTO: 91.6 FL — SIGNIFICANT CHANGE UP (ref 80–94)
MCV RBC AUTO: 92 FL — SIGNIFICANT CHANGE UP (ref 80–94)
MONOCYTES # BLD AUTO: 1 K/UL — HIGH (ref 0.1–0.6)
MONOCYTES NFR BLD AUTO: 6.2 % — SIGNIFICANT CHANGE UP (ref 1.7–9.3)
NEUTROPHILS # BLD AUTO: 13.27 K/UL — HIGH (ref 1.4–6.5)
NEUTROPHILS NFR BLD AUTO: 82.7 % — HIGH (ref 42.2–75.2)
NITRITE UR-MCNC: NEGATIVE — SIGNIFICANT CHANGE UP
NRBC # BLD: 0 /100 WBCS — SIGNIFICANT CHANGE UP (ref 0–0)
NRBC # BLD: 0 /100 WBCS — SIGNIFICANT CHANGE UP (ref 0–0)
PCO2 BLDA: 34 MMHG — LOW (ref 38–42)
PH BLDA: 7.4 — SIGNIFICANT CHANGE UP (ref 7.38–7.42)
PH UR: 6.5 — SIGNIFICANT CHANGE UP (ref 5–8)
PLATELET # BLD AUTO: 118 K/UL — LOW (ref 130–400)
PLATELET # BLD AUTO: 182 K/UL — SIGNIFICANT CHANGE UP (ref 130–400)
PO2 BLDA: 112 MMHG — HIGH (ref 78–95)
POTASSIUM SERPL-MCNC: 3.8 MMOL/L — SIGNIFICANT CHANGE UP (ref 3.5–5)
POTASSIUM SERPL-MCNC: 4.2 MMOL/L — SIGNIFICANT CHANGE UP (ref 3.5–5)
POTASSIUM SERPL-SCNC: 3.8 MMOL/L — SIGNIFICANT CHANGE UP (ref 3.5–5)
POTASSIUM SERPL-SCNC: 4.2 MMOL/L — SIGNIFICANT CHANGE UP (ref 3.5–5)
PROT SERPL-MCNC: 5.3 G/DL — LOW (ref 6–8)
PROT SERPL-MCNC: 6.5 G/DL — SIGNIFICANT CHANGE UP (ref 6–8)
PROT UR-MCNC: NEGATIVE — SIGNIFICANT CHANGE UP
PROTHROM AB SERPL-ACNC: 17.3 SEC — HIGH (ref 9.95–12.87)
RBC # BLD: 3.71 M/UL — LOW (ref 4.7–6.1)
RBC # BLD: 3.74 M/UL — LOW (ref 4.7–6.1)
RBC # FLD: 11.9 % — SIGNIFICANT CHANGE UP (ref 11.5–14.5)
RBC # FLD: 11.9 % — SIGNIFICANT CHANGE UP (ref 11.5–14.5)
SAO2 % BLDA: 97 % — SIGNIFICANT CHANGE UP (ref 94–98)
SODIUM SERPL-SCNC: 137 MMOL/L — SIGNIFICANT CHANGE UP (ref 135–146)
SODIUM SERPL-SCNC: 140 MMOL/L — SIGNIFICANT CHANGE UP (ref 135–146)
SP GR SPEC: 1.02 — SIGNIFICANT CHANGE UP (ref 1.01–1.02)
TSH SERPL-MCNC: 1.1 UIU/ML — SIGNIFICANT CHANGE UP (ref 0.27–4.2)
UROBILINOGEN FLD QL: SIGNIFICANT CHANGE UP
WBC # BLD: 16.01 K/UL — HIGH (ref 4.8–10.8)
WBC # BLD: 16.07 K/UL — HIGH (ref 4.8–10.8)
WBC # FLD AUTO: 16.01 K/UL — HIGH (ref 4.8–10.8)
WBC # FLD AUTO: 16.07 K/UL — HIGH (ref 4.8–10.8)

## 2019-07-23 PROCEDURE — 93503 INSERT/PLACE HEART CATHETER: CPT

## 2019-07-23 PROCEDURE — 35600 OPEN HRV UXTR ART 1 SGM CAB: CPT | Mod: AS,LT

## 2019-07-23 PROCEDURE — 33508 ENDOSCOPIC VEIN HARVEST: CPT

## 2019-07-23 PROCEDURE — 33517 CABG ARTERY-VEIN SINGLE: CPT | Mod: AS

## 2019-07-23 PROCEDURE — 33517 CABG ARTERY-VEIN SINGLE: CPT

## 2019-07-23 PROCEDURE — 71045 X-RAY EXAM CHEST 1 VIEW: CPT | Mod: 26

## 2019-07-23 PROCEDURE — 36620 INSERTION CATHETER ARTERY: CPT

## 2019-07-23 PROCEDURE — 35600 OPEN HRV UXTR ART 1 SGM CAB: CPT | Mod: LT

## 2019-07-23 PROCEDURE — 93010 ELECTROCARDIOGRAM REPORT: CPT | Mod: 76

## 2019-07-23 PROCEDURE — 99231 SBSQ HOSP IP/OBS SF/LOW 25: CPT | Mod: 57

## 2019-07-23 PROCEDURE — 33508 ENDOSCOPIC VEIN HARVEST: CPT | Mod: AS

## 2019-07-23 PROCEDURE — 99291 CRITICAL CARE FIRST HOUR: CPT | Mod: 25

## 2019-07-23 PROCEDURE — 33534 CABG ARTERIAL TWO: CPT

## 2019-07-23 PROCEDURE — 33534 CABG ARTERIAL TWO: CPT | Mod: AS

## 2019-07-23 PROCEDURE — 71045 X-RAY EXAM CHEST 1 VIEW: CPT | Mod: 26,77

## 2019-07-23 RX ORDER — CHLORHEXIDINE GLUCONATE 213 G/1000ML
15 SOLUTION TOPICAL EVERY 12 HOURS
Refills: 0 | Status: DISCONTINUED | OUTPATIENT
Start: 2019-07-23 | End: 2019-07-24

## 2019-07-23 RX ORDER — DOCUSATE SODIUM 100 MG
100 CAPSULE ORAL THREE TIMES A DAY
Refills: 0 | Status: DISCONTINUED | OUTPATIENT
Start: 2019-07-23 | End: 2019-07-29

## 2019-07-23 RX ORDER — IPRATROPIUM BROMIDE 0.2 MG/ML
2 SOLUTION, NON-ORAL INHALATION EVERY 6 HOURS
Refills: 0 | Status: DISCONTINUED | OUTPATIENT
Start: 2019-07-23 | End: 2019-07-24

## 2019-07-23 RX ORDER — MEPERIDINE HYDROCHLORIDE 50 MG/ML
25 INJECTION INTRAMUSCULAR; INTRAVENOUS; SUBCUTANEOUS ONCE
Refills: 0 | Status: DISCONTINUED | OUTPATIENT
Start: 2019-07-23 | End: 2019-07-26

## 2019-07-23 RX ORDER — NITROGLYCERIN 6.5 MG
5 CAPSULE, EXTENDED RELEASE ORAL
Qty: 50 | Refills: 0 | Status: DISCONTINUED | OUTPATIENT
Start: 2019-07-23 | End: 2019-07-25

## 2019-07-23 RX ORDER — CHLORHEXIDINE GLUCONATE 213 G/1000ML
1 SOLUTION TOPICAL
Refills: 0 | Status: DISCONTINUED | OUTPATIENT
Start: 2019-07-23 | End: 2019-07-29

## 2019-07-23 RX ORDER — INSULIN HUMAN 100 [IU]/ML
1 INJECTION, SOLUTION SUBCUTANEOUS
Qty: 100 | Refills: 0 | Status: DISCONTINUED | OUTPATIENT
Start: 2019-07-23 | End: 2019-07-25

## 2019-07-23 RX ORDER — ASPIRIN/CALCIUM CARB/MAGNESIUM 324 MG
325 TABLET ORAL DAILY
Refills: 0 | Status: DISCONTINUED | OUTPATIENT
Start: 2019-07-23 | End: 2019-07-29

## 2019-07-23 RX ORDER — DEXMEDETOMIDINE HYDROCHLORIDE IN 0.9% SODIUM CHLORIDE 4 UG/ML
0.25 INJECTION INTRAVENOUS
Qty: 200 | Refills: 0 | Status: DISCONTINUED | OUTPATIENT
Start: 2019-07-23 | End: 2019-07-24

## 2019-07-23 RX ORDER — DEXTROSE 50 % IN WATER 50 %
50 SYRINGE (ML) INTRAVENOUS
Refills: 0 | Status: DISCONTINUED | OUTPATIENT
Start: 2019-07-23 | End: 2019-07-27

## 2019-07-23 RX ORDER — ALBUTEROL 90 UG/1
2 AEROSOL, METERED ORAL EVERY 6 HOURS
Refills: 0 | Status: DISCONTINUED | OUTPATIENT
Start: 2019-07-23 | End: 2019-07-24

## 2019-07-23 RX ORDER — ALBUMIN HUMAN 25 %
500 VIAL (ML) INTRAVENOUS ONCE
Refills: 0 | Status: COMPLETED | OUTPATIENT
Start: 2019-07-23 | End: 2019-07-23

## 2019-07-23 RX ORDER — CHLORHEXIDINE GLUCONATE 213 G/1000ML
5 SOLUTION TOPICAL EVERY 12 HOURS
Refills: 0 | Status: DISCONTINUED | OUTPATIENT
Start: 2019-07-23 | End: 2019-07-24

## 2019-07-23 RX ORDER — FAMOTIDINE 10 MG/ML
20 INJECTION INTRAVENOUS EVERY 12 HOURS
Refills: 0 | Status: DISCONTINUED | OUTPATIENT
Start: 2019-07-23 | End: 2019-07-24

## 2019-07-23 RX ORDER — NOREPINEPHRINE BITARTRATE/D5W 8 MG/250ML
0.05 PLASTIC BAG, INJECTION (ML) INTRAVENOUS
Qty: 8 | Refills: 0 | Status: DISCONTINUED | OUTPATIENT
Start: 2019-07-23 | End: 2019-07-24

## 2019-07-23 RX ORDER — OXYCODONE HYDROCHLORIDE 5 MG/1
10 TABLET ORAL EVERY 4 HOURS
Refills: 0 | Status: DISCONTINUED | OUTPATIENT
Start: 2019-07-23 | End: 2019-07-29

## 2019-07-23 RX ORDER — NICARDIPINE HYDROCHLORIDE 30 MG/1
5 CAPSULE, EXTENDED RELEASE ORAL
Qty: 40 | Refills: 0 | Status: DISCONTINUED | OUTPATIENT
Start: 2019-07-23 | End: 2019-07-24

## 2019-07-23 RX ORDER — PROPOFOL 10 MG/ML
5 INJECTION, EMULSION INTRAVENOUS
Qty: 1000 | Refills: 0 | Status: DISCONTINUED | OUTPATIENT
Start: 2019-07-23 | End: 2019-07-24

## 2019-07-23 RX ORDER — ASPIRIN/CALCIUM CARB/MAGNESIUM 324 MG
300 TABLET ORAL ONCE
Refills: 0 | Status: COMPLETED | OUTPATIENT
Start: 2019-07-23 | End: 2019-07-23

## 2019-07-23 RX ORDER — ONDANSETRON 8 MG/1
4 TABLET, FILM COATED ORAL ONCE
Refills: 0 | Status: COMPLETED | OUTPATIENT
Start: 2019-07-23 | End: 2019-07-24

## 2019-07-23 RX ORDER — SODIUM CHLORIDE 9 MG/ML
1000 INJECTION INTRAMUSCULAR; INTRAVENOUS; SUBCUTANEOUS
Refills: 0 | Status: DISCONTINUED | OUTPATIENT
Start: 2019-07-23 | End: 2019-07-26

## 2019-07-23 RX ORDER — SENNA PLUS 8.6 MG/1
2 TABLET ORAL AT BEDTIME
Refills: 0 | Status: DISCONTINUED | OUTPATIENT
Start: 2019-07-23 | End: 2019-07-29

## 2019-07-23 RX ORDER — CEFAZOLIN SODIUM 1 G
1000 VIAL (EA) INJECTION EVERY 8 HOURS
Refills: 0 | Status: COMPLETED | OUTPATIENT
Start: 2019-07-23 | End: 2019-07-24

## 2019-07-23 RX ORDER — ALBUMIN HUMAN 25 %
500 VIAL (ML) INTRAVENOUS ONCE
Refills: 0 | Status: COMPLETED | OUTPATIENT
Start: 2019-07-23 | End: 2019-07-24

## 2019-07-23 RX ORDER — FENTANYL CITRATE 50 UG/ML
25 INJECTION INTRAVENOUS ONCE
Refills: 0 | Status: DISCONTINUED | OUTPATIENT
Start: 2019-07-23 | End: 2019-07-23

## 2019-07-23 RX ORDER — MAGNESIUM SULFATE 500 MG/ML
1 VIAL (ML) INJECTION EVERY 12 HOURS
Refills: 0 | Status: DISCONTINUED | OUTPATIENT
Start: 2019-07-23 | End: 2019-07-29

## 2019-07-23 RX ORDER — HEPARIN SODIUM 5000 [USP'U]/ML
1000 INJECTION INTRAVENOUS; SUBCUTANEOUS
Qty: 25000 | Refills: 0 | Status: DISCONTINUED | OUTPATIENT
Start: 2019-07-23 | End: 2019-07-23

## 2019-07-23 RX ORDER — OXYCODONE HYDROCHLORIDE 5 MG/1
5 TABLET ORAL EVERY 4 HOURS
Refills: 0 | Status: DISCONTINUED | OUTPATIENT
Start: 2019-07-23 | End: 2019-07-29

## 2019-07-23 RX ORDER — NITROGLYCERIN 6.5 MG
5 CAPSULE, EXTENDED RELEASE ORAL
Qty: 50 | Refills: 0 | Status: DISCONTINUED | OUTPATIENT
Start: 2019-07-23 | End: 2019-07-23

## 2019-07-23 RX ADMIN — Medication 1.5 MICROGRAM(S)/MIN: at 09:36

## 2019-07-23 RX ADMIN — CHLORHEXIDINE GLUCONATE 15 MILLILITER(S): 213 SOLUTION TOPICAL at 06:55

## 2019-07-23 RX ADMIN — FAMOTIDINE 20 MILLIGRAM(S): 10 INJECTION INTRAVENOUS at 21:37

## 2019-07-23 RX ADMIN — Medication 12.5 MILLIGRAM(S): at 06:47

## 2019-07-23 RX ADMIN — Medication 250 MILLILITER(S): at 19:00

## 2019-07-23 RX ADMIN — FENTANYL CITRATE 25 MICROGRAM(S): 50 INJECTION INTRAVENOUS at 22:40

## 2019-07-23 RX ADMIN — SODIUM CHLORIDE 3 MILLILITER(S): 9 INJECTION INTRAMUSCULAR; INTRAVENOUS; SUBCUTANEOUS at 06:46

## 2019-07-23 RX ADMIN — PANTOPRAZOLE SODIUM 40 MILLIGRAM(S): 20 TABLET, DELAYED RELEASE ORAL at 06:47

## 2019-07-23 RX ADMIN — Medication 250 MILLILITER(S): at 23:00

## 2019-07-23 RX ADMIN — Medication 0.5 MILLIGRAM(S): at 10:00

## 2019-07-23 RX ADMIN — CHLORHEXIDINE GLUCONATE 15 MILLILITER(S): 213 SOLUTION TOPICAL at 19:21

## 2019-07-23 RX ADMIN — Medication 100 MILLIGRAM(S): at 23:24

## 2019-07-23 RX ADMIN — Medication 100 MILLIGRAM(S): at 06:47

## 2019-07-23 NOTE — DISCHARGE NOTE PROVIDER - HOSPITAL COURSE
64 male with HTN presenting for elective LHC. Positive stress test, as per patient's daughter he could not finish a treadmill stress test.     Patient underwent CABG procedure on 7/23/19 64 male with HTN presenting for elective LHC. Positive stress test, as per patient's daughter he could not finish a treadmill stress test.     Patient underwent CABG procedure on 7/23/19. His po course was prolonged by dyspnea, which required a left thoracentesis for left pleural effusion.    the remainder of his hospital course was uneventful and he was discharged home in stable condition on POD #6

## 2019-07-23 NOTE — DISCHARGE NOTE PROVIDER - PROVIDER TOKENS
PROVIDER:[TOKEN:[[id number]]],PROVIDER:[TOKEN:[[id number]]],PROVIDER:[TOKEN:[[id number]]]

## 2019-07-23 NOTE — DISCHARGE NOTE PROVIDER - NSDCACTIVITY_GEN_ALL_CORE
No heavy lifting/straining/Walking - Outdoors allowed/Walking - Indoors allowed/Stairs allowed/Do not drive or operate machinery/Showering allowed/Do not make important decisions

## 2019-07-23 NOTE — DISCHARGE NOTE PROVIDER - NSDCCPCAREPLAN_GEN_ALL_CORE_FT
PRINCIPAL DISCHARGE DIAGNOSIS  Diagnosis: Coronary artery disease with unstable angina pectoris  Assessment and Plan of Treatment:

## 2019-07-23 NOTE — BRIEF OPERATIVE NOTE - NSICDXBRIEFPREOP_GEN_ALL_CORE_FT
PRE-OP DIAGNOSIS:  CAD in native artery 23-Jul-2019 10:47:12  Joann Davis
PRE-OP DIAGNOSIS:  Mild CAD 23-Jul-2019 09:28:37  Monty Dean
PRE-OP DIAGNOSIS:  Coronary artery disease with unstable angina pectoris 23-Jul-2019 10:57:42  Yakov Yu

## 2019-07-23 NOTE — BRIEF OPERATIVE NOTE - NSICDXBRIEFPROCEDURE_GEN_ALL_CORE_FT
PROCEDURES:  CABG, with SOLEDAD 23-Jul-2019 09:28:27  Monty Dean
PROCEDURES:  CABG, with SOLEDAD 23-Jul-2019 09:28:27  Monty Dean

## 2019-07-23 NOTE — PROGRESS NOTE ADULT - SUBJECTIVE AND OBJECTIVE BOX
CTU Attending Progress Daily Note     2019 10:24  Admited 19, Hospital Day 1d  Preop    HPI:  64 male with HTN presenting for elective LHC. Positive stress test, as per patient's daughter he could not finish a treadmill stress test. (2019 07:45)    Home Medications:  atorvastatin 20 mg oral tablet: 1 tab(s) orally once a day (2019 07:25)  metoprolol succinate 50 mg oral tablet, extended release: 1 tab(s) orally once a day (2019 07:25)  Plavix 75 mg oral tablet: 1 tab(s) orally once a day (2019 07:25)    FAMILY HISTORY:  No pertinent family history in first degree relatives    PAST MEDICAL & SURGICAL HISTORY:  Vertigo    Interval event for past 24 hr:  JAVIER CORONA  64y had no event.   Current Complains:  JAVIER CORONA has retrosternal CP this morning with pseudonormalization of T waves in precordial leads. Started IV NTG, S-G cath and Holliday places at bedside and IV Heparin started. Now patient is CP free and hemodynamically astable.  Allergies    No Known Allergies    Intolerances      OBJECTIVE:  Vitals last 24 hrs  T(C): 36.4 (19 @ 08:00), Max: 36.4 (19 @ 08:00)  T(F): 97.5 (19 @ 08:00), Max: 97.5 (19 @ 08:00)  HR: 79 (19 @ 10:00) (58 - 87)  BP: 131/76 (19 @ 10:00) (110/65 - 161/74)  ABP: 153/79 (19 @ 10:00) (147/77 - 160/77)  ABP(mean): 99 (19 @ 10:00) (99 - 978)  RR: 18 (19 @ 10:00) (12 - 44)  SpO2: 96% (19 @ 10:00) (94% - 99%)  CVP(mm Hg): 9 (19 @ 10:00) (8 - 9)  CI: --  PA: 22/15 (19 @ 09:00) (22/15 - 22/15)      19 @ 07:01  -  19 @ 07:00  --------------------------------------------------------  IN:    Oral Fluid: 240 mL    sodium chloride 0.9%.: 750 mL  Total IN: 990 mL    OUT:    Voided: 350 mL  Total OUT: 350 mL    Total NET: 640 mL          CAPILLARY BLOOD GLUCOSE        LABS:                          15.7   9.79  )-----------( 220      ( 2019 12:03 )             47.1     Hemoglobin: 15.7 g/dL ( @ 12:03)  Hemoglobin: 10.4 g/dL ( @ 07:29)        137  |  105  |  14  ----------------------------<  103<H>  3.8   |  22  |  1.0    Ca    8.6      2019 04:20    TPro  6.5  /  Alb  3.8  /  TBili  1.1  /  DBili  x   /  AST  20  /  ALT  24  /  AlkPhos  100      Creatinine, Serum: 1.0 mg/dL ( @ 04:20)  Creatinine, Serum: 1.0 mg/dL ( @ 12:03)    PT/INR - ( 2019 12:03 )   PT: 14.60 sec;   INR: 1.27 ratio         PTT - ( 2019 12:03 )  PTT:47.0 sec  Urinalysis Basic - ( 2019 06:36 )    Color: Yellow / Appearance: Clear / S.019 / pH: x  Gluc: x / Ketone: Trace  / Bili: Negative / Urobili: <2 mg/dL   Blood: x / Protein: Negative / Nitrite: Negative   Leuk Esterase: Negative / RBC: x / WBC x   Sq Epi: x / Non Sq Epi: x / Bacteria: x        HOSPITAL MEDICATIONS:  MEDICATIONS  (STANDING):  albumin human  5% IVPB 3000 milliLiter(s) IV Intermittent once  aspirin 325 milliGRAM(s) Oral daily  atorvastatin 20 milliGRAM(s) Oral at bedtime  docusate sodium 100 milliGRAM(s) Oral three times a day  heparin  Infusion 1000 Unit(s)/Hr (10 mL/Hr) IV Continuous <Continuous>  LORazepam   Injectable 0.5 milliGRAM(s) IV Push once  melatonin 5 milliGRAM(s) Oral at bedtime  nitroglycerin  Infusion 5 MICROgram(s)/Min (1.5 mL/Hr) IV Continuous <Continuous>  pantoprazole    Tablet 40 milliGRAM(s) Oral before breakfast  sodium chloride 0.9% lock flush 3 milliLiter(s) IV Push every 8 hours  sodium chloride 0.9%. 1000 milliLiter(s) (50 mL/Hr) IV Continuous <Continuous>    MEDICATIONS  (PRN):      REVIEW OF SYSTEMS:  CONSTITUTIONAL: [X] all negative; [ ] weakness, [ ] fevers, [ ] chills  EYES/ENT: [X] all negative; [ ] visual changes, [ ] vertigo, [ ] throat pain, [ ] eye pain  NECK: [X] all negative; [ ] pain, [ ] stiffness  RESPIRATORY: [ ] all negative, [ ] cough, [ ] wheezing, [ ] hemoptysis, [ ] shortness of breath  CARDIOVASCULAR: [ ] all negative; [x ] chest pain, [ ] palpitations, [ ] orthopnea  GASTROINTESTINAL: [X] all negative; [ ]abdominal pain, [ ] nausea, [ ] vomiting, [ ] hematemesis, [ ] diarrhea, [ ] constipation, [ ] melena, [ ] hematochezia.  GENITOURINARY: [X] all negative; [ ] dysuria, [ ] frequency, [ ] hematuria  NEUROLOGICAL: [X] all negative; [ ] numbness, [ ] weakness, [ ] paresthesias  MUSCULOSKELETAL: [X] all negative, [ ] joint pain, [ ] joint swelling, [ ] joint redness, [ ] bone pain  SKIN: [X] all negative; [ ] itching, [ ] burning, [ ] rashes, [ ] lesions   All other review of systems is negative unless indicated above.    [  ] Unable to assess ROS because     PHYSICAL EXAM:          CONSTITUTIONAL: Well-developed; well-nourished; in no acute distress.   	SKIN: warm, dry, no rashes or lesions  	HEENT: Atraumatic. Normocephalic. PERRL. Moist membranes, no conj injection, sclera clear  	NECK: Supple; non tender.  No JVD. No lymphadenopathy.  	CARD: Normal S1, S2. Rate and Rhythm are regular. No murmurs.  	RESP: Good air entry bilaterally, no wheezes, no rales no rhonchi.  	ABD: Soft, not tender, not distended, no CVA ttp no rebound no guarding, bowel sounds present  	EXT: Normal ROM.  No clubbing, no cyanosis, no pedal edema, no calf pain b/l, Peripheral pulses intact.  	LYMPH: No acute cervical adenopathy.  	NEURO: Alert, awake, motor 5/5 R, 5/5 L, sensation intact bilat, CN 2-12 intact,          PSYCH: Cooperative, appropriate. Alert & oriented x 3    RADIOLOGY:  X Reviewed and interpreted by me  CxR from 19 shows mild congestion, no pneumothorax, no effusion, no cardiomegaly,   S-G Catheter in place,  ECG:

## 2019-07-23 NOTE — DISCHARGE NOTE PROVIDER - NSDCHHNEEDSERVICE_GEN_ALL_CORE
Medication teaching and assessment/Wound care and assessment/Observation and assessment Medication teaching and assessment/Teaching and training/Wound care and assessment/Observation and assessment

## 2019-07-23 NOTE — BRIEF OPERATIVE NOTE - ELECTIVE PROCEDURE
Form faxed to  Home Care and STAT faxed.     
Forms received from  home care on 08/02/18 for DORINA Sanon.  Forms with RN to review medications.  Orders pended for provider to sign.    Forms given to PCP for signature on .     
Med reconciliation completed. Form and chart forwarded to PCP for signatures.  Cristel Alvares RN      
No

## 2019-07-23 NOTE — PROGRESS NOTE ADULT - SUBJECTIVE AND OBJECTIVE BOX
SUBJECTIVE ASSESSMENT:  pt slept well, no complaints    Vital Signs Last 24 Hrs  T(C): 36.1 (2019 00:05), Max: 36.1 (2019 00:05)  T(F): 97 (2019 00:05), Max: 97 (2019 00:05)  HR: 59 (2019 07:29) (58 - 87)  BP: 132/76 (2019 07:00) (110/65 - 161/74)  BP(mean): 99 (2019 07:00) (82 - 106)  RR: 19 (2019 07:29) (12 - 44)  SpO2: 97% (2019 07:29) (94% - 99%)  19 @ 07:01  -  19 @ 07:00  --------------------------------------------------------  IN: 990 mL / OUT: 350 mL / NET: 640 mL    A&OX3 in NAD  CTA bilat  sternum stable, no drainage  nl s1, s2  abd soft/NT/ND  no peripheral edema    LABS:                        15.7   9.79  )-----------( 220      ( 2019 12:03 )             47.1      PT/INR - ( 2019 12:03 )   PT: 14.60 sec;   INR: 1.27 ratio      PTT - ( 2019 12:03 )  PTT:47.0 sec      137  |  105  |  14  ----------------------------<  103<H>  3.8   |  22  |  1.0    Ca    8.6      2019 04:20    TPro  6.5  /  Alb  3.8  /  TBili  1.1  /  DBili  x   /  AST  20  /  ALT  24  /  AlkPhos  100  07-23    Urinalysis Basic - ( 2019 06:36 )    Color: Yellow / Appearance: Clear / S.019 / pH: x  Gluc: x / Ketone: Trace  / Bili: Negative / Urobili: <2 mg/dL   Blood: x / Protein: Negative / Nitrite: Negative   Leuk Esterase: Negative / RBC: x / WBC x   Sq Epi: x / Non Sq Epi: x / Bacteria: x    MEDICATIONS  (STANDING):  albumin human  5% IVPB 3000 milliLiter(s) IV Intermittent once  aspirin 325 milliGRAM(s) Oral daily  atorvastatin 20 milliGRAM(s) Oral at bedtime  docusate sodium 100 milliGRAM(s) Oral three times a day  melatonin 5 milliGRAM(s) Oral at bedtime  pantoprazole    Tablet 40 milliGRAM(s) Oral before breakfast

## 2019-07-23 NOTE — BRIEF OPERATIVE NOTE - NSICDXBRIEFPOSTOP_GEN_ALL_CORE_FT
POST-OP DIAGNOSIS:  3-vessel CAD 23-Jul-2019 09:28:47  Monty Dean
POST-OP DIAGNOSIS:  3-vessel CAD 23-Jul-2019 09:28:47  Monty Dean
POST-OP DIAGNOSIS:  Coronary artery disease with unstable angina pectoris 23-Jul-2019 10:58:00  Yakov Yu

## 2019-07-24 LAB
ALBUMIN SERPL ELPH-MCNC: 4.5 G/DL — SIGNIFICANT CHANGE UP (ref 3.5–5.2)
ALP SERPL-CCNC: 57 U/L — SIGNIFICANT CHANGE UP (ref 30–115)
ALT FLD-CCNC: 16 U/L — SIGNIFICANT CHANGE UP (ref 0–41)
ANION GAP SERPL CALC-SCNC: 10 MMOL/L — SIGNIFICANT CHANGE UP (ref 7–14)
APTT BLD: 31.3 SEC — SIGNIFICANT CHANGE UP (ref 27–39.2)
AST SERPL-CCNC: 37 U/L — SIGNIFICANT CHANGE UP (ref 0–41)
BASE EXCESS BLDMV CALC-SCNC: -4 MMOL/L — SIGNIFICANT CHANGE UP
BASOPHILS # BLD AUTO: 0.01 K/UL — SIGNIFICANT CHANGE UP (ref 0–0.2)
BASOPHILS NFR BLD AUTO: 0.1 % — SIGNIFICANT CHANGE UP (ref 0–1)
BILIRUB SERPL-MCNC: 1.9 MG/DL — HIGH (ref 0.2–1.2)
BUN SERPL-MCNC: 14 MG/DL — SIGNIFICANT CHANGE UP (ref 10–20)
CALCIUM SERPL-MCNC: 8.1 MG/DL — LOW (ref 8.5–10.1)
CHLORIDE SERPL-SCNC: 111 MMOL/L — HIGH (ref 98–110)
CO2 SERPL-SCNC: 21 MMOL/L — SIGNIFICANT CHANGE UP (ref 17–32)
CREAT SERPL-MCNC: 1 MG/DL — SIGNIFICANT CHANGE UP (ref 0.7–1.5)
EOSINOPHIL # BLD AUTO: 0 K/UL — SIGNIFICANT CHANGE UP (ref 0–0.7)
EOSINOPHIL NFR BLD AUTO: 0 % — SIGNIFICANT CHANGE UP (ref 0–8)
GAS PNL BLDA: SIGNIFICANT CHANGE UP
GLUCOSE BLDC GLUCOMTR-MCNC: 115 MG/DL — HIGH (ref 70–99)
GLUCOSE BLDC GLUCOMTR-MCNC: 118 MG/DL — HIGH (ref 70–99)
GLUCOSE BLDC GLUCOMTR-MCNC: 127 MG/DL — HIGH (ref 70–99)
GLUCOSE BLDC GLUCOMTR-MCNC: 134 MG/DL — HIGH (ref 70–99)
GLUCOSE BLDC GLUCOMTR-MCNC: 134 MG/DL — HIGH (ref 70–99)
GLUCOSE BLDC GLUCOMTR-MCNC: 135 MG/DL — HIGH (ref 70–99)
GLUCOSE BLDC GLUCOMTR-MCNC: 136 MG/DL — HIGH (ref 70–99)
GLUCOSE BLDC GLUCOMTR-MCNC: 147 MG/DL — HIGH (ref 70–99)
GLUCOSE BLDC GLUCOMTR-MCNC: 154 MG/DL — HIGH (ref 70–99)
GLUCOSE SERPL-MCNC: 144 MG/DL — HIGH (ref 70–99)
HCO3 BLDMV-SCNC: 22 MMOL/L — SIGNIFICANT CHANGE UP
HCT VFR BLD CALC: 29.3 % — LOW (ref 42–52)
HGB BLD-MCNC: 9.9 G/DL — LOW (ref 14–18)
HOROWITZ INDEX BLDMV+IHG-RTO: 40 — SIGNIFICANT CHANGE UP
IMM GRANULOCYTES NFR BLD AUTO: 0.5 % — HIGH (ref 0.1–0.3)
INR BLD: 1.49 RATIO — HIGH (ref 0.65–1.3)
LYMPHOCYTES # BLD AUTO: 0.98 K/UL — LOW (ref 1.2–3.4)
LYMPHOCYTES # BLD AUTO: 8.3 % — LOW (ref 20.5–51.1)
MAGNESIUM SERPL-MCNC: 2.5 MG/DL — HIGH (ref 1.8–2.4)
MCHC RBC-ENTMCNC: 30.8 PG — SIGNIFICANT CHANGE UP (ref 27–31)
MCHC RBC-ENTMCNC: 33.8 G/DL — SIGNIFICANT CHANGE UP (ref 32–37)
MCV RBC AUTO: 91.3 FL — SIGNIFICANT CHANGE UP (ref 80–94)
MONOCYTES # BLD AUTO: 0.89 K/UL — HIGH (ref 0.1–0.6)
MONOCYTES NFR BLD AUTO: 7.6 % — SIGNIFICANT CHANGE UP (ref 1.7–9.3)
NEUTROPHILS # BLD AUTO: 9.81 K/UL — HIGH (ref 1.4–6.5)
NEUTROPHILS NFR BLD AUTO: 83.5 % — HIGH (ref 42.2–75.2)
NRBC # BLD: 0 /100 WBCS — SIGNIFICANT CHANGE UP (ref 0–0)
O2 CT VFR BLD CALC: 47 MMHG — HIGH (ref 35–40)
PCO2 BLDMV: 44 MMHG — SIGNIFICANT CHANGE UP (ref 41–51)
PH BLDMV: 7.31 — LOW (ref 7.33–7.44)
PLATELET # BLD AUTO: 123 K/UL — LOW (ref 130–400)
POTASSIUM SERPL-MCNC: 4.1 MMOL/L — SIGNIFICANT CHANGE UP (ref 3.5–5)
POTASSIUM SERPL-SCNC: 4.1 MMOL/L — SIGNIFICANT CHANGE UP (ref 3.5–5)
PROT SERPL-MCNC: 6 G/DL — SIGNIFICANT CHANGE UP (ref 6–8)
PROTHROM AB SERPL-ACNC: 17.1 SEC — HIGH (ref 9.95–12.87)
RBC # BLD: 3.21 M/UL — LOW (ref 4.7–6.1)
RBC # FLD: 11.8 % — SIGNIFICANT CHANGE UP (ref 11.5–14.5)
SAO2 % BLDMV: 80 % — HIGH (ref 70–75)
SODIUM SERPL-SCNC: 142 MMOL/L — SIGNIFICANT CHANGE UP (ref 135–146)
WBC # BLD: 11.75 K/UL — HIGH (ref 4.8–10.8)
WBC # FLD AUTO: 11.75 K/UL — HIGH (ref 4.8–10.8)

## 2019-07-24 PROCEDURE — 93010 ELECTROCARDIOGRAM REPORT: CPT

## 2019-07-24 PROCEDURE — 71045 X-RAY EXAM CHEST 1 VIEW: CPT | Mod: 26,77

## 2019-07-24 PROCEDURE — 71045 X-RAY EXAM CHEST 1 VIEW: CPT | Mod: 26

## 2019-07-24 PROCEDURE — 99233 SBSQ HOSP IP/OBS HIGH 50: CPT

## 2019-07-24 RX ORDER — FAMOTIDINE 10 MG/ML
20 INJECTION INTRAVENOUS DAILY
Refills: 0 | Status: DISCONTINUED | OUTPATIENT
Start: 2019-07-24 | End: 2019-07-29

## 2019-07-24 RX ORDER — KETOROLAC TROMETHAMINE 30 MG/ML
30 SYRINGE (ML) INJECTION ONCE
Refills: 0 | Status: DISCONTINUED | OUTPATIENT
Start: 2019-07-24 | End: 2019-07-24

## 2019-07-24 RX ORDER — ATORVASTATIN CALCIUM 80 MG/1
40 TABLET, FILM COATED ORAL AT BEDTIME
Refills: 0 | Status: DISCONTINUED | OUTPATIENT
Start: 2019-07-24 | End: 2019-07-29

## 2019-07-24 RX ORDER — ALBUMIN HUMAN 25 %
500 VIAL (ML) INTRAVENOUS ONCE
Refills: 0 | Status: COMPLETED | OUTPATIENT
Start: 2019-07-24 | End: 2019-07-24

## 2019-07-24 RX ORDER — HEPARIN SODIUM 5000 [USP'U]/ML
5000 INJECTION INTRAVENOUS; SUBCUTANEOUS EVERY 8 HOURS
Refills: 0 | Status: DISCONTINUED | OUTPATIENT
Start: 2019-07-24 | End: 2019-07-29

## 2019-07-24 RX ORDER — VASOPRESSIN 20 [USP'U]/ML
0.04 INJECTION INTRAVENOUS
Qty: 50 | Refills: 0 | Status: DISCONTINUED | OUTPATIENT
Start: 2019-07-24 | End: 2019-07-25

## 2019-07-24 RX ORDER — IPRATROPIUM/ALBUTEROL SULFATE 18-103MCG
3 AEROSOL WITH ADAPTER (GRAM) INHALATION EVERY 6 HOURS
Refills: 0 | Status: DISCONTINUED | OUTPATIENT
Start: 2019-07-24 | End: 2019-07-29

## 2019-07-24 RX ORDER — FUROSEMIDE 40 MG
80 TABLET ORAL DAILY
Refills: 0 | Status: DISCONTINUED | OUTPATIENT
Start: 2019-07-24 | End: 2019-07-24

## 2019-07-24 RX ADMIN — Medication 250 MILLILITER(S): at 00:00

## 2019-07-24 RX ADMIN — HEPARIN SODIUM 5000 UNIT(S): 5000 INJECTION INTRAVENOUS; SUBCUTANEOUS at 21:45

## 2019-07-24 RX ADMIN — Medication 600 MILLIGRAM(S): at 18:34

## 2019-07-24 RX ADMIN — Medication 30 MILLIGRAM(S): at 10:55

## 2019-07-24 RX ADMIN — Medication 250 MILLILITER(S): at 12:00

## 2019-07-24 RX ADMIN — ONDANSETRON 104 MILLIGRAM(S): 8 TABLET, FILM COATED ORAL at 17:13

## 2019-07-24 RX ADMIN — SENNA PLUS 2 TABLET(S): 8.6 TABLET ORAL at 21:44

## 2019-07-24 RX ADMIN — OXYCODONE HYDROCHLORIDE 10 MILLIGRAM(S): 5 TABLET ORAL at 06:20

## 2019-07-24 RX ADMIN — Medication 100 MILLIGRAM(S): at 17:14

## 2019-07-24 RX ADMIN — OXYCODONE HYDROCHLORIDE 10 MILLIGRAM(S): 5 TABLET ORAL at 18:33

## 2019-07-24 RX ADMIN — CHLORHEXIDINE GLUCONATE 15 MILLILITER(S): 213 SOLUTION TOPICAL at 06:21

## 2019-07-24 RX ADMIN — Medication 100 MILLIGRAM(S): at 06:21

## 2019-07-24 RX ADMIN — Medication 100 MILLIGRAM(S): at 21:42

## 2019-07-24 RX ADMIN — Medication 250 MILLILITER(S): at 18:14

## 2019-07-24 RX ADMIN — OXYCODONE HYDROCHLORIDE 10 MILLIGRAM(S): 5 TABLET ORAL at 11:10

## 2019-07-24 RX ADMIN — Medication 100 GRAM(S): at 18:34

## 2019-07-24 RX ADMIN — Medication 250 MILLILITER(S): at 18:51

## 2019-07-24 RX ADMIN — CHLORHEXIDINE GLUCONATE 5 MILLILITER(S): 213 SOLUTION TOPICAL at 06:21

## 2019-07-24 RX ADMIN — Medication 250 MILLILITER(S): at 05:40

## 2019-07-24 RX ADMIN — Medication 325 MILLIGRAM(S): at 17:14

## 2019-07-24 RX ADMIN — FAMOTIDINE 20 MILLIGRAM(S): 10 INJECTION INTRAVENOUS at 06:23

## 2019-07-24 RX ADMIN — OXYCODONE HYDROCHLORIDE 10 MILLIGRAM(S): 5 TABLET ORAL at 06:37

## 2019-07-24 RX ADMIN — Medication 3 MILLILITER(S): at 19:49

## 2019-07-24 RX ADMIN — Medication 600 MILLIGRAM(S): at 06:23

## 2019-07-24 RX ADMIN — OXYCODONE HYDROCHLORIDE 10 MILLIGRAM(S): 5 TABLET ORAL at 17:10

## 2019-07-24 RX ADMIN — FAMOTIDINE 20 MILLIGRAM(S): 10 INJECTION INTRAVENOUS at 21:46

## 2019-07-24 RX ADMIN — OXYCODONE HYDROCHLORIDE 10 MILLIGRAM(S): 5 TABLET ORAL at 00:09

## 2019-07-24 RX ADMIN — OXYCODONE HYDROCHLORIDE 10 MILLIGRAM(S): 5 TABLET ORAL at 11:40

## 2019-07-24 RX ADMIN — Medication 100 MILLIGRAM(S): at 06:20

## 2019-07-24 RX ADMIN — CHLORHEXIDINE GLUCONATE 1 APPLICATION(S): 213 SOLUTION TOPICAL at 06:24

## 2019-07-24 RX ADMIN — FENTANYL CITRATE 25 MICROGRAM(S): 50 INJECTION INTRAVENOUS at 02:51

## 2019-07-24 RX ADMIN — OXYCODONE HYDROCHLORIDE 10 MILLIGRAM(S): 5 TABLET ORAL at 02:51

## 2019-07-24 RX ADMIN — ATORVASTATIN CALCIUM 40 MILLIGRAM(S): 80 TABLET, FILM COATED ORAL at 21:43

## 2019-07-24 RX ADMIN — Medication 100 GRAM(S): at 06:20

## 2019-07-24 RX ADMIN — Medication 30 MILLIGRAM(S): at 10:40

## 2019-07-24 NOTE — PROGRESS NOTE ADULT - SUBJECTIVE AND OBJECTIVE BOX
OPERATIVE PROCEDURE(s):                POD #                       64yMale  SURGEON(s): MARIE Tinoco  SUBJECTIVE ASSESSMENT:   Vital Signs Last 24 Hrs  T(F): 99 (2019 08:30), Max: 100 (2019 05:00)  HR: 75 (2019 09:00) (72 - 96)  BP: 105/57 (2019 08:30) (105/57 - 131/76)  BP(mean): 74 (2019 08:30) (74 - 98)  ABP: 103/51 (2019 09:00) (-24/-80 - 160/68)  ABP(mean): 68 (2019 09:00)  RR: 18 (:00) (9 - 40)  SpO2: 95% (:00) (94% - 100%)  CVP(mm Hg): 10 (2019 09:00)  CVP(cm H2O): --  CO: 4.9 (2019 08:00)  CI: 2.4 (2019 08:00)  PA: 26/14 (2019 09:00)  SVR: 962 (2019 08:00)  Mode: CPAP with PS  FiO2: 40  PEEP: 5  PS: 5    I&O's Detail    2019 07:  -  2019 07:00  --------------------------------------------------------  IN:    Albumin 5%  - 500 mL: 2000 mL    dexmedetomidine Infusion: 79.3 mL    insulin regular Infusion: 11 mL    IV PiggyBack: 250 mL    nitroglycerin  Infusion: 52 mL    nitroglycerin  Infusion: 55 mL    norepinephrine Infusion: 57 mL    Oral Fluid: 150 mL    sodium chloride 0.9%: 200 mL    sodium chloride 0.9%.: 300 mL    vasopressin Infusion: 2.4 mL  Total IN: 3156.7 mL    OUT:    Chest Tube: 380 mL    Chest Tube: 190 mL    Indwelling Catheter - Urethral: 938 mL    Nasoenteral Tube: 150 mL    Voided: 350 mL  Total OUT: 2008 mL        Net:   I&O's Detail    2019 07:  -  2019 07:00  --------------------------------------------------------  Total NET: 640 mL      2019 07:01  -  2019 07:00  --------------------------------------------------------  Total NET: 1148.7 mL        CAPILLARY BLOOD GLUCOSE  154 (2019 08:00)  158 (2019 00:00)  141 (2019 22:30)      POCT Blood Glucose.: 154 mg/dL (2019 08:11)  POCT Blood Glucose.: 147 mg/dL (2019 06:35)  POCT Blood Glucose.: 136 mg/dL (2019 05:11)  POCT Blood Glucose.: 135 mg/dL (2019 03:02)  POCT Blood Glucose.: 134 mg/dL (2019 02:07)  POCT Blood Glucose.: 130 mg/dL (2019 21:07)  POCT Blood Glucose.: 128 mg/dL (2019 19:13)    Physical Exam:  General: NAD; A&Ox3/Patient is intubated and sedated  Cardiac: S1/S2, RRR, no murmur, no rubs  Lungs: unlabored respirations, CTA b/l, no wheeze, no rales, no crackles  Abdomen: Soft/NT/ND; positive bowel sounds x 4  Sternum: Intact, no click, incision healing well with no drainage  Incisions: Incisions clean/dry/intact  Extremities: No edema b/l lower extremities; good capillary refill; no cyanosis; palpable 1+ pedal pulses b/l    Central Venous Catheter: Yes[]  No[] , If Yes indication:           Day #  Tse Catheter: Yes  [] , No  [] , If yes indication:                      Day #  NGT: Yes [] No [] ,    If Yes Placement:                                     Day #  EPICARDIAL WIRES:  [] YES [] NO                                              Day #  BOWEL MOVEMENT:  [] YES [] NO, If No, Timing since last BM:      Day #  CHEST TUBE(Left/Right):  [] YES [] NO, If yes -  AIR LEAKS:  [] YES [] NO        LABS:                        9.9<L>  11.75<H> )-----------( 123<L>    ( 2019 02:00 )             29.3<L>                        11.4<L>  16.07<H> )-----------( 118<L>    ( 2019 17:22 )             34.4<L>    07-24    142  |  111<H>  |  14  ----------------------------<  144<H>  4.1   |  21  |  1.0  23    140  |  107  |  14  ----------------------------<  122<H>  4.2   |  21  |  1.0    Ca    8.1<L>      2019 02:00  Mg     2.5     -24    TPro  6.0 [6.0 - 8.0]  /  Alb  4.5 [3.5 - 5.2]  /  TBili  1.9<H> [0.2 - 1.2]  /  DBili  x   /  AST  37 [0 - 41]  /  ALT  16 [0 - 41]  /  AlkPhos  57 [30 - 115]  24    PT/INR - ( 2019 02:00 )   PT: ;   INR: 1.49 ratio         PT/INR - ( 2019 17:22 )   PT: ;   INR: 1.51 ratio         PTT - ( 2019 02:00 )  PTT:31.3 sec, PTT - ( 2019 17:22 )  PTT:27.9 sec  Urinalysis Basic - ( 2019 06:36 )    Color: Yellow / Appearance: Clear / S.019 / pH: x  Gluc: x / Ketone: Trace  / Bili: Negative / Urobili: <2 mg/dL   Blood: x / Protein: Negative / Nitrite: Negative   Leuk Esterase: Negative / RBC: x / WBC x   Sq Epi: x / Non Sq Epi: x / Bacteria: x      ABG - ( 2019 04:34 )  pH: 7.35  /  pCO2: 40    /  pO2: 107   / HCO3: 22    / Base Excess: -3.4  /  SaO2: 98    /  LA: 1.0              RADIOLOGY & ADDITIONAL TESTS:  CXR:  EKG:  MEDICATIONS  (STANDING):  albumin human  5% IVPB 3000 milliLiter(s) IV Intermittent once  ALBUTerol    90 MICROgram(s) HFA Inhaler 2 Puff(s) Inhalation every 6 hours  aspirin enteric coated 325 milliGRAM(s) Oral daily  aspirin Suppository 300 milliGRAM(s) Rectal once  ceFAZolin   IVPB 1000 milliGRAM(s) IV Intermittent every 8 hours  chlorhexidine 0.12% Liquid 5 milliLiter(s) Oral Mucosa every 12 hours  chlorhexidine 0.12% Liquid 15 milliLiter(s) Oral Mucosa every 12 hours  chlorhexidine 4% Liquid 1 Application(s) Topical <User Schedule>  dexmedetomidine Infusion 0.25 MICROgram(s)/kG/Hr (5.375 mL/Hr) IV Continuous <Continuous>  dextrose 50% Injectable 50 milliLiter(s) IV Push every 15 minutes  docusate sodium 100 milliGRAM(s) Oral three times a day  famotidine Injectable 20 milliGRAM(s) IV Push every 12 hours  guaiFENesin  milliGRAM(s) Oral every 12 hours  insulin regular Infusion 1 Unit(s)/Hr (1 mL/Hr) IV Continuous <Continuous>  ipratropium 17 MICROgram(s) HFA Inhaler 2 Puff(s) Inhalation every 6 hours  magnesium sulfate  IVPB 1 Gram(s) IV Intermittent every 12 hours  meperidine     Injectable 25 milliGRAM(s) IV Push once  niCARdipine Infusion 5 mG/Hr (25 mL/Hr) IV Continuous <Continuous>  nitroglycerin  Infusion 5 MICROgram(s)/Min (1.5 mL/Hr) IV Continuous <Continuous>  norepinephrine Infusion 0.05 MICROgram(s)/kG/Min (8.063 mL/Hr) IV Continuous <Continuous>  propofol Infusion 5 MICROgram(s)/kG/Min (2.58 mL/Hr) IV Continuous <Continuous>  senna 2 Tablet(s) Oral at bedtime  sodium chloride 0.9%. 1000 milliLiter(s) (20 mL/Hr) IV Continuous <Continuous>  vasopressin Infusion 0.04 Unit(s)/Min (2.4 mL/Hr) IV Continuous <Continuous>    MEDICATIONS  (PRN):  bisacodyl 5 milliGRAM(s) Oral every 12 hours PRN Constipation  ondansetron  IVPB 4 milliGRAM(s) IV Intermittent once PRN Nausea and/or Vomiting  oxyCODONE    IR 5 milliGRAM(s) Oral every 4 hours PRN Moderate Pain (4 - 6)  oxyCODONE    IR 10 milliGRAM(s) Oral every 4 hours PRN Severe Pain (7 - 10)    HEPARIN:  [] YES [] NO  Dose: XX UNITS/HR UNITS Q8H  LOVENOX:[] YES [] NO  Dose: XX mg Q24H  COUMADIN: []  YES [] NO  Dose: XX mg  Q24H  SCD's: YES b/l  GI Prophylaxis: Protonix [], Pepcid [], None [], (Contra-indication:.....)    Post-Op Beta-Blockers: Yes [], No[], If No, then contraindication:  Post-Op Aspirin: Yes [],  No [], If No, then contraindication:  Post-Op Statin: Yes [], No[], If No, then contraindication:  Allergies    No Known Allergies    Intolerances      Ambulation/Activity Status:    Assessment/Plan:  64y Male status-post .....  - Case and plan discussed with CTU Intensivist and CT Surgeon - Dr. Odonnell/Levy/Jenise   - Continue CTU supportive care    - Continue DVT/GI prophylaxis  - Incentive Spirometry 10 times an hour  - Continue to advance physical activity as tolerated and continue PT/OT as directed  1. CAD: Continue ASA, statin, BB  2. HTN:   3. A. Fib:   4. COPD/Hypoxia:   5. DM/Glucose Control:     Social Service Disposition: OPERATIVE PROCEDURE(s):  CABGx3              POD #   1                    64yMale  SURGEON(s): Dr. Burden  SUBJECTIVE ASSESSMENT: Patient seen and examined at bedside. Pt has no complaints at this time.  Vital Signs Last 24 Hrs  T(F): 99 (2019 08:30), Max: 100 (2019 05:00)  HR: 75 (2019 09:00) (72 - 96)  BP: 105/57 (2019 08:30) (105/57 - 131/76)  BP(mean): 74 (2019 08:30) (74 - 98)  ABP: 103/51 (2019 09:00) (-24/-80 - 160/68)  ABP(mean): 68 (2019 09:00)  RR: 18 (2019 09:00) (9 - 40)  SpO2: 95% (2019 09:00) (94% - 100%)  CVP(mm Hg): 10 (2019 09:00)  CO: 4.9 (2019 08:00)  CI: 2.4 (2019 08:00)  PA: 26/14 (2019 09:00)  SVR: 962 (2019 08:00)  Mode: CPAP with PS  FiO2: 40  PEEP: 5  PS: 5    I&O's Detail    2019 07:  -  2019 07:00  --------------------------------------------------------  IN:    Albumin 5%  - 500 mL: 2000 mL    dexmedetomidine Infusion: 79.3 mL    insulin regular Infusion: 11 mL    IV PiggyBack: 250 mL    nitroglycerin  Infusion: 52 mL    nitroglycerin  Infusion: 55 mL    norepinephrine Infusion: 57 mL    Oral Fluid: 150 mL    sodium chloride 0.9%: 200 mL    sodium chloride 0.9%.: 300 mL    vasopressin Infusion: 2.4 mL  Total IN: 3156.7 mL    OUT:    Chest Tube: 380 mL    Chest Tube: 190 mL    Indwelling Catheter - Urethral: 938 mL    Nasoenteral Tube: 150 mL    Voided: 350 mL  Total OUT: 2008 mL        Net:   I&O's Detail    2019 07:01  -  2019 07:00  --------------------------------------------------------  Total NET: 640 mL      2019 07:01  -  2019 07:00  --------------------------------------------------------  Total NET: 1148.7 mL        CAPILLARY BLOOD GLUCOSE  154 (2019 08:00)  158 (2019 00:00)  141 (2019 22:30)      POCT Blood Glucose.: 154 mg/dL (2019 08:11)  POCT Blood Glucose.: 147 mg/dL (2019 06:35)  POCT Blood Glucose.: 136 mg/dL (2019 05:11)  POCT Blood Glucose.: 135 mg/dL (2019 03:02)  POCT Blood Glucose.: 134 mg/dL (2019 02:07)  POCT Blood Glucose.: 130 mg/dL (2019 21:07)  POCT Blood Glucose.: 128 mg/dL (2019 19:13)    Physical Exam:  General: NAD; A&Ox3  Cardiac: S1/S2, RRR, no murmur, no rubs  Lungs: unlabored respirations, CTA b/l, no wheeze, no rales, no crackles  Abdomen: Soft/NT/ND; positive bowel sounds x 4  Sternum: Intact, no click, incision healing well with no drainage  Incisions: Incisions clean/dry/intact  Extremities: No edema b/l lower extremities; good capillary refill; no cyanosis; palpable 1+ pedal pulses b/l    Central Venous Catheter: Yes[x]  No[] , If Yes indication: IV access, hemodynamically unstable           Day # 1  Tse Catheter: Yes  [x] , No  [] , If yes indication: Monitor strict in/outs                     Day # 1  NGT: Yes [] No [x] ,    If Yes Placement:                                     Day #  EPICARDIAL WIRES:  [x] YES [] NO                                              Day # 1  BOWEL MOVEMENT:  [] YES [x] NO, If No, Timing since last BM:      Day #  CHEST TUBE(Left/Right):  [x] YES [] NO, If yes -  AIR LEAKS:  [] YES [] NO        LABS:                        9.9<L>  11.75<H> )-----------( 123<L>    ( 2019 02:00 )             29.3<L>                        11.4<L>  16.07<H> )-----------( 118<L>    ( 2019 17:22 )             34.4<L>    0724    142  |  111<H>  |  14  ----------------------------<  144<H>  4.1   |  21  |  1.0  23    140  |  107  |  14  ----------------------------<  122<H>  4.2   |  21  |  1.0    Ca    8.1<L>      2019 02:00  Mg     2.5     -24    TPro  6.0 [6.0 - 8.0]  /  Alb  4.5 [3.5 - 5.2]  /  TBili  1.9<H> [0.2 - 1.2]  /  DBili  x   /  AST  37 [0 - 41]  /  ALT  16 [0 - 41]  /  AlkPhos  57 [30 - 115]  -24    PT/INR - ( 2019 02:00 )   PT: ;   INR: 1.49 ratio         PT/INR - ( 2019 17:22 )   PT: ;   INR: 1.51 ratio         PTT - ( 2019 02:00 )  PTT:31.3 sec, PTT - ( 2019 17:22 )  PTT:27.9 sec  Urinalysis Basic - ( 2019 06:36 )    Color: Yellow / Appearance: Clear / S.019 / pH: x  Gluc: x / Ketone: Trace  / Bili: Negative / Urobili: <2 mg/dL   Blood: x / Protein: Negative / Nitrite: Negative   Leuk Esterase: Negative / RBC: x / WBC x   Sq Epi: x / Non Sq Epi: x / Bacteria: x      ABG - ( 2019 04:34 )  pH: 7.35  /  pCO2: 40    /  pO2: 107   / HCO3: 22    / Base Excess: -3.4  /  SaO2: 98    /  LA: 1.0              RADIOLOGY & ADDITIONAL TESTS:  CXR: < from: Xray Chest 1 View- PORTABLE-Routine (19 @ 04:23) >  EXAM:  XR CHEST PORTABLE ROUTINE 1V            PROCEDURE DATE:  2019  Impression:      Stable small left basilar opacity.        EKG:   Ventricular Rate 73 BPM    Atrial Rate 73 BPM    P-R Interval 168 ms    QRS Duration 92 ms    Q-T Interval 432 ms    QTC Calculation(Bezet) 475 ms    P Axis 63 degrees    R Axis -24 degrees    T Axis 33 degrees    Diagnosis Line Normal sinus rhythm  RSR' or QR pattern in V1 suggests right ventricular conduction delay  Borderline ECG      MEDICATIONS  (STANDING):  albumin human  5% IVPB 3000 milliLiter(s) IV Intermittent once  ALBUTerol    90 MICROgram(s) HFA Inhaler 2 Puff(s) Inhalation every 6 hours  aspirin enteric coated 325 milliGRAM(s) Oral daily  aspirin Suppository 300 milliGRAM(s) Rectal once  ceFAZolin   IVPB 1000 milliGRAM(s) IV Intermittent every 8 hours  chlorhexidine 0.12% Liquid 5 milliLiter(s) Oral Mucosa every 12 hours  chlorhexidine 0.12% Liquid 15 milliLiter(s) Oral Mucosa every 12 hours  chlorhexidine 4% Liquid 1 Application(s) Topical <User Schedule>  dexmedetomidine Infusion 0.25 MICROgram(s)/kG/Hr (5.375 mL/Hr) IV Continuous <Continuous>  dextrose 50% Injectable 50 milliLiter(s) IV Push every 15 minutes  docusate sodium 100 milliGRAM(s) Oral three times a day  famotidine Injectable 20 milliGRAM(s) IV Push every 12 hours  guaiFENesin  milliGRAM(s) Oral every 12 hours  insulin regular Infusion 1 Unit(s)/Hr (1 mL/Hr) IV Continuous <Continuous>  ipratropium 17 MICROgram(s) HFA Inhaler 2 Puff(s) Inhalation every 6 hours  magnesium sulfate  IVPB 1 Gram(s) IV Intermittent every 12 hours  meperidine     Injectable 25 milliGRAM(s) IV Push once  niCARdipine Infusion 5 mG/Hr (25 mL/Hr) IV Continuous <Continuous>  nitroglycerin  Infusion 5 MICROgram(s)/Min (1.5 mL/Hr) IV Continuous <Continuous>  norepinephrine Infusion 0.05 MICROgram(s)/kG/Min (8.063 mL/Hr) IV Continuous <Continuous>  propofol Infusion 5 MICROgram(s)/kG/Min (2.58 mL/Hr) IV Continuous <Continuous>  senna 2 Tablet(s) Oral at bedtime  sodium chloride 0.9%. 1000 milliLiter(s) (20 mL/Hr) IV Continuous <Continuous>  vasopressin Infusion 0.04 Unit(s)/Min (2.4 mL/Hr) IV Continuous <Continuous>    MEDICATIONS  (PRN):  bisacodyl 5 milliGRAM(s) Oral every 12 hours PRN Constipation  ondansetron  IVPB 4 milliGRAM(s) IV Intermittent once PRN Nausea and/or Vomiting  oxyCODONE    IR 5 milliGRAM(s) Oral every 4 hours PRN Moderate Pain (4 - 6)  oxyCODONE    IR 10 milliGRAM(s) Oral every 4 hours PRN Severe Pain (7 - 10)    HEPARIN:  [] YES [x] NO  Dose: XX UNITS/HR UNITS Q8H  LOVENOX:[] YES [x] NO  Dose: XX mg Q24H  COUMADIN: []  YES [x] NO  Dose: XX mg  Q24H  SCD's: YES b/l  GI Prophylaxis: famotidine Injectable 20 milliGRAM(s) IV Push every 12 hours    Post-Op Beta-Blockers: Yes [], No[x], If No, then contraindication: On vasopressors, will start when weaned off  Post-Op Aspirin: Yes [x],  No [], If No, then contraindication:  Post-Op Statin: Yes [], No[x], If No, then contraindication: Will start today   Allergies    No Known Allergies    Intolerances      Ambulation/Activity Status: Ambulate out of bed to chair     Assessment/Plan:  64y Male status-post CABGx3 POD #1  - Case and plan discussed with CTU Intensivist and CT Surgeon - Dr. Burden   - Continue CTU supportive care    - Continue DVT/GI prophylaxis  - Incentive Spirometry 10 times an hour  - Continue to advance physical activity as tolerated and continue PT/OT as directed  1. CAD: Continue ASA, Will start Lipitor today, start BB when weaned off of vasopressors   2. HTN: cont nicardipine   3. A. Fib: mag sulfate 1gm   4. COPD/Hypoxia: cont albuterol, mucinex, wean O2 as tolerated   5. DM/Glucose Control: insulin regular Infusion 1 Unit(s)/Hr   6. Will D/C swan, chest tube   7. DVT ppx: will start Heparin   8. Hypovolemia: give 1 L albumin    Social Service Disposition:  pt to reassess OPERATIVE PROCEDURE(s):  CABGx3              POD #   1                    64yMale  SURGEON(s): Dr. Burden  SUBJECTIVE ASSESSMENT: Patient seen and examined at bedside. Pt has no complaints at this time.  Vital Signs Last 24 Hrs  T(F): 99 (2019 08:30), Max: 100 (2019 05:00)  HR: 75 (2019 09:00) (72 - 96)  BP: 105/57 (2019 08:30) (105/57 - 131/76)  BP(mean): 74 (2019 08:30) (74 - 98)  ABP: 103/51 (2019 09:00) (-24/-80 - 160/68)  ABP(mean): 68 (2019 09:00)  RR: 18 (2019 09:00) (9 - 40)  SpO2: 95% (2019 09:00) (94% - 100%)  CVP(mm Hg): 10 (2019 09:00)  CO: 4.9 (2019 08:00)  CI: 2.4 (2019 08:00)  PA: 26/14 (2019 09:00)  SVR: 962 (2019 08:00)  Mode: CPAP with PS  FiO2: 40  PEEP: 5  PS: 5    I&O's Detail    2019 07:  -  2019 07:00  --------------------------------------------------------  IN:    Albumin 5%  - 500 mL: 2000 mL    dexmedetomidine Infusion: 79.3 mL    insulin regular Infusion: 11 mL    IV PiggyBack: 250 mL    nitroglycerin  Infusion: 52 mL    nitroglycerin  Infusion: 55 mL    norepinephrine Infusion: 57 mL    Oral Fluid: 150 mL    sodium chloride 0.9%: 200 mL    sodium chloride 0.9%.: 300 mL    vasopressin Infusion: 2.4 mL  Total IN: 3156.7 mL    OUT:    Chest Tube: 380 mL    Chest Tube: 190 mL    Indwelling Catheter - Urethral: 938 mL    Nasoenteral Tube: 150 mL    Voided: 350 mL  Total OUT: 2008 mL        Net:   I&O's Detail    2019 07:01  -  2019 07:00  --------------------------------------------------------  Total NET: 640 mL      2019 07:01  -  2019 07:00  --------------------------------------------------------  Total NET: 1148.7 mL        CAPILLARY BLOOD GLUCOSE  154 (2019 08:00)  158 (2019 00:00)  141 (2019 22:30)      POCT Blood Glucose.: 154 mg/dL (2019 08:11)  POCT Blood Glucose.: 147 mg/dL (2019 06:35)  POCT Blood Glucose.: 136 mg/dL (2019 05:11)  POCT Blood Glucose.: 135 mg/dL (2019 03:02)  POCT Blood Glucose.: 134 mg/dL (2019 02:07)  POCT Blood Glucose.: 130 mg/dL (2019 21:07)  POCT Blood Glucose.: 128 mg/dL (2019 19:13)    Physical Exam:  General: NAD; A&Ox3  Cardiac: S1/S2, RRR, no murmur, no rubs  Lungs: unlabored respirations, CTA b/l, no wheeze, no rales, no crackles  Abdomen: Soft/NT/ND; positive bowel sounds x 4  Sternum: Intact, no click, incision healing well with no drainage  Incisions: Incisions clean/dry/intact  Extremities: No edema b/l lower extremities; good capillary refill; no cyanosis; palpable 1+ pedal pulses b/l    Central Venous Catheter: Yes[x]  No[] , If Yes indication: IV access, hemodynamically unstable           Day # 1  Tse Catheter: Yes  [x] , No  [] , If yes indication: Monitor strict in/outs                     Day # 1  NGT: Yes [] No [x] ,    If Yes Placement:                                     Day #  EPICARDIAL WIRES:  [x] YES [] NO                                              Day # 1  BOWEL MOVEMENT:  [] YES [x] NO, If No, Timing since last BM:      Day #  CHEST TUBE(Left/Right):  [x] YES [] NO, If yes -  AIR LEAKS:  [] YES [] NO        LABS:                        9.9<L>  11.75<H> )-----------( 123<L>    ( 2019 02:00 )             29.3<L>                        11.4<L>  16.07<H> )-----------( 118<L>    ( 2019 17:22 )             34.4<L>    0724    142  |  111<H>  |  14  ----------------------------<  144<H>  4.1   |  21  |  1.0  23    140  |  107  |  14  ----------------------------<  122<H>  4.2   |  21  |  1.0    Ca    8.1<L>      2019 02:00  Mg     2.5     -24    TPro  6.0 [6.0 - 8.0]  /  Alb  4.5 [3.5 - 5.2]  /  TBili  1.9<H> [0.2 - 1.2]  /  DBili  x   /  AST  37 [0 - 41]  /  ALT  16 [0 - 41]  /  AlkPhos  57 [30 - 115]  -24    PT/INR - ( 2019 02:00 )   PT: ;   INR: 1.49 ratio         PT/INR - ( 2019 17:22 )   PT: ;   INR: 1.51 ratio         PTT - ( 2019 02:00 )  PTT:31.3 sec, PTT - ( 2019 17:22 )  PTT:27.9 sec  Urinalysis Basic - ( 2019 06:36 )    Color: Yellow / Appearance: Clear / S.019 / pH: x  Gluc: x / Ketone: Trace  / Bili: Negative / Urobili: <2 mg/dL   Blood: x / Protein: Negative / Nitrite: Negative   Leuk Esterase: Negative / RBC: x / WBC x   Sq Epi: x / Non Sq Epi: x / Bacteria: x      ABG - ( 2019 04:34 )  pH: 7.35  /  pCO2: 40    /  pO2: 107   / HCO3: 22    / Base Excess: -3.4  /  SaO2: 98    /  LA: 1.0              RADIOLOGY & ADDITIONAL TESTS:  CXR: < from: Xray Chest 1 View- PORTABLE-Routine (19 @ 04:23) >  EXAM:  XR CHEST PORTABLE ROUTINE 1V            PROCEDURE DATE:  2019  Impression:      Stable small left basilar opacity.        EKG:   Ventricular Rate 73 BPM    Atrial Rate 73 BPM    P-R Interval 168 ms    QRS Duration 92 ms    Q-T Interval 432 ms    QTC Calculation(Bezet) 475 ms    P Axis 63 degrees    R Axis -24 degrees    T Axis 33 degrees    Diagnosis Line Normal sinus rhythm  RSR' or QR pattern in V1 suggests right ventricular conduction delay  Borderline ECG      MEDICATIONS  (STANDING):  albumin human  5% IVPB 3000 milliLiter(s) IV Intermittent once  ALBUTerol    90 MICROgram(s) HFA Inhaler 2 Puff(s) Inhalation every 6 hours  aspirin enteric coated 325 milliGRAM(s) Oral daily  aspirin Suppository 300 milliGRAM(s) Rectal once  ceFAZolin   IVPB 1000 milliGRAM(s) IV Intermittent every 8 hours  chlorhexidine 0.12% Liquid 5 milliLiter(s) Oral Mucosa every 12 hours  chlorhexidine 0.12% Liquid 15 milliLiter(s) Oral Mucosa every 12 hours  chlorhexidine 4% Liquid 1 Application(s) Topical <User Schedule>  dexmedetomidine Infusion 0.25 MICROgram(s)/kG/Hr (5.375 mL/Hr) IV Continuous <Continuous>  dextrose 50% Injectable 50 milliLiter(s) IV Push every 15 minutes  docusate sodium 100 milliGRAM(s) Oral three times a day  famotidine Injectable 20 milliGRAM(s) IV Push every 12 hours  guaiFENesin  milliGRAM(s) Oral every 12 hours  insulin regular Infusion 1 Unit(s)/Hr (1 mL/Hr) IV Continuous <Continuous>  ipratropium 17 MICROgram(s) HFA Inhaler 2 Puff(s) Inhalation every 6 hours  magnesium sulfate  IVPB 1 Gram(s) IV Intermittent every 12 hours  meperidine     Injectable 25 milliGRAM(s) IV Push once  niCARdipine Infusion 5 mG/Hr (25 mL/Hr) IV Continuous <Continuous>  nitroglycerin  Infusion 5 MICROgram(s)/Min (1.5 mL/Hr) IV Continuous <Continuous>  norepinephrine Infusion 0.05 MICROgram(s)/kG/Min (8.063 mL/Hr) IV Continuous <Continuous>  propofol Infusion 5 MICROgram(s)/kG/Min (2.58 mL/Hr) IV Continuous <Continuous>  senna 2 Tablet(s) Oral at bedtime  sodium chloride 0.9%. 1000 milliLiter(s) (20 mL/Hr) IV Continuous <Continuous>  vasopressin Infusion 0.04 Unit(s)/Min (2.4 mL/Hr) IV Continuous <Continuous>    MEDICATIONS  (PRN):  bisacodyl 5 milliGRAM(s) Oral every 12 hours PRN Constipation  ondansetron  IVPB 4 milliGRAM(s) IV Intermittent once PRN Nausea and/or Vomiting  oxyCODONE    IR 5 milliGRAM(s) Oral every 4 hours PRN Moderate Pain (4 - 6)  oxyCODONE    IR 10 milliGRAM(s) Oral every 4 hours PRN Severe Pain (7 - 10)    HEPARIN:  [] YES [x] NO  Dose: XX UNITS/HR UNITS Q8H  LOVENOX:[] YES [x] NO  Dose: XX mg Q24H  COUMADIN: []  YES [x] NO  Dose: XX mg  Q24H  SCD's: YES b/l  GI Prophylaxis: famotidine Injectable 20 milliGRAM(s) IV Push every 12 hours    Post-Op Beta-Blockers: Yes [], No[x], If No, then contraindication: On vasopressors, will start when weaned off  Post-Op Aspirin: Yes [x],  No [], If No, then contraindication:  Post-Op Statin: Yes [], No[x], If No, then contraindication: Will start today   Allergies    No Known Allergies    Intolerances      Ambulation/Activity Status: Ambulate out of bed to chair     Assessment/Plan:  64y Male status-post CABGx3 POD #1  - Case and plan discussed with CTU Intensivist and CT Surgeon - Dr. Burden   - Continue CTU supportive care    - Continue DVT/GI prophylaxis  - Incentive Spirometry 10 times an hour  - Continue to advance physical activity as tolerated and continue PT/OT as directed  1. CAD: Continue ASA, Will start Lipitor today, start BB when weaned off of vasopressors   2. HTN: cont nicardipine   3. A. Fib: mag sulfate 1gm   4. COPD/Hypoxia: cont duoneb, mucinex, wean O2 as tolerated   5. DM/Glucose Control: insulin regular Infusion 1 Unit(s)/Hr   6. Will D/C swan, chest tube   7. DVT ppx: will start Heparin   8. Hypovolemia: give 1 L albumin    Social Service Disposition:  pt to reassess OPERATIVE PROCEDURE(s):  CABGx3              POD #   1                    64yMale  SURGEON(s): Dr. Burden  SUBJECTIVE ASSESSMENT: Patient seen and examined at bedside. Pt has no complaints at this time.  Vital Signs Last 24 Hrs  T(F): 99 (2019 08:30), Max: 100 (2019 05:00)  HR: 75 (2019 09:00) (72 - 96)  BP: 105/57 (2019 08:30) (105/57 - 131/76)  BP(mean): 74 (2019 08:30) (74 - 98)  ABP: 103/51 (2019 09:00) (-24/-80 - 160/68)  ABP(mean): 68 (2019 09:00)  RR: 18 (2019 09:00) (9 - 40)  SpO2: 95% (2019 09:00) (94% - 100%)  CVP(mm Hg): 10 (2019 09:00)  CO: 4.9 (2019 08:00)  CI: 2.4 (2019 08:00)  PA: 26/14 (2019 09:00)  SVR: 962 (2019 08:00)  Mode: CPAP with PS  FiO2: 40  PEEP: 5  PS: 5    I&O's Detail    2019 07:  -  2019 07:00  --------------------------------------------------------  IN:    Albumin 5%  - 500 mL: 2000 mL    dexmedetomidine Infusion: 79.3 mL    insulin regular Infusion: 11 mL    IV PiggyBack: 250 mL    nitroglycerin  Infusion: 52 mL    nitroglycerin  Infusion: 55 mL    norepinephrine Infusion: 57 mL    Oral Fluid: 150 mL    sodium chloride 0.9%: 200 mL    sodium chloride 0.9%.: 300 mL    vasopressin Infusion: 2.4 mL  Total IN: 3156.7 mL    OUT:    Chest Tube: 380 mL    Chest Tube: 190 mL    Indwelling Catheter - Urethral: 938 mL    Nasoenteral Tube: 150 mL    Voided: 350 mL  Total OUT: 2008 mL        Net:   I&O's Detail    2019 07:01  -  2019 07:00  --------------------------------------------------------  Total NET: 640 mL      2019 07:01  -  2019 07:00  --------------------------------------------------------  Total NET: 1148.7 mL        CAPILLARY BLOOD GLUCOSE  154 (2019 08:00)  158 (2019 00:00)  141 (2019 22:30)      POCT Blood Glucose.: 154 mg/dL (2019 08:11)  POCT Blood Glucose.: 147 mg/dL (2019 06:35)  POCT Blood Glucose.: 136 mg/dL (2019 05:11)  POCT Blood Glucose.: 135 mg/dL (2019 03:02)  POCT Blood Glucose.: 134 mg/dL (2019 02:07)  POCT Blood Glucose.: 130 mg/dL (2019 21:07)  POCT Blood Glucose.: 128 mg/dL (2019 19:13)    Physical Exam:  General: NAD; A&Ox3  Cardiac: S1/S2, RRR, no murmur, no rubs  Lungs: unlabored respirations, CTA b/l, no wheeze, no rales, no crackles  Abdomen: Soft/NT/ND; positive bowel sounds x 4  Sternum: Intact, no click, incision healing well with no drainage  Incisions: Incisions clean/dry/intact  Extremities: No edema b/l lower extremities; good capillary refill; no cyanosis; palpable 1+ pedal pulses b/l    Central Venous Catheter: Yes[x]  No[] , If Yes indication: IV access, hemodynamically unstable           Day # 1  Tse Catheter: Yes  [x] , No  [] , If yes indication: Monitor strict in/outs                     Day # 1  NGT: Yes [] No [x] ,    If Yes Placement:                                     Day #  EPICARDIAL WIRES:  [x] YES [] NO                                              Day # 1  BOWEL MOVEMENT:  [] YES [x] NO, If No, Timing since last BM:      Day #  CHEST TUBE(Left/Right):  [x] YES [] NO, If yes -  AIR LEAKS:  [] YES [] NO        LABS:                        9.9<L>  11.75<H> )-----------( 123<L>    ( 2019 02:00 )             29.3<L>                        11.4<L>  16.07<H> )-----------( 118<L>    ( 2019 17:22 )             34.4<L>    0724    142  |  111<H>  |  14  ----------------------------<  144<H>  4.1   |  21  |  1.0  23    140  |  107  |  14  ----------------------------<  122<H>  4.2   |  21  |  1.0    Ca    8.1<L>      2019 02:00  Mg     2.5     -24    TPro  6.0 [6.0 - 8.0]  /  Alb  4.5 [3.5 - 5.2]  /  TBili  1.9<H> [0.2 - 1.2]  /  DBili  x   /  AST  37 [0 - 41]  /  ALT  16 [0 - 41]  /  AlkPhos  57 [30 - 115]  -24    PT/INR - ( 2019 02:00 )   PT: ;   INR: 1.49 ratio         PT/INR - ( 2019 17:22 )   PT: ;   INR: 1.51 ratio         PTT - ( 2019 02:00 )  PTT:31.3 sec, PTT - ( 2019 17:22 )  PTT:27.9 sec  Urinalysis Basic - ( 2019 06:36 )    Color: Yellow / Appearance: Clear / S.019 / pH: x  Gluc: x / Ketone: Trace  / Bili: Negative / Urobili: <2 mg/dL   Blood: x / Protein: Negative / Nitrite: Negative   Leuk Esterase: Negative / RBC: x / WBC x   Sq Epi: x / Non Sq Epi: x / Bacteria: x      ABG - ( 2019 04:34 )  pH: 7.35  /  pCO2: 40    /  pO2: 107   / HCO3: 22    / Base Excess: -3.4  /  SaO2: 98    /  LA: 1.0              RADIOLOGY & ADDITIONAL TESTS:  CXR: < from: Xray Chest 1 View- PORTABLE-Routine (19 @ 04:23) >  EXAM:  XR CHEST PORTABLE ROUTINE 1V            PROCEDURE DATE:  2019  Impression:      Stable small left basilar opacity.        EKG:   Ventricular Rate 73 BPM    Atrial Rate 73 BPM    P-R Interval 168 ms    QRS Duration 92 ms    Q-T Interval 432 ms    QTC Calculation(Bezet) 475 ms    P Axis 63 degrees    R Axis -24 degrees    T Axis 33 degrees    Diagnosis Line Normal sinus rhythm  RSR' or QR pattern in V1 suggests right ventricular conduction delay  Borderline ECG      MEDICATIONS  (STANDING):  albumin human  5% IVPB 3000 milliLiter(s) IV Intermittent once  ALBUTerol    90 MICROgram(s) HFA Inhaler 2 Puff(s) Inhalation every 6 hours  aspirin enteric coated 325 milliGRAM(s) Oral daily  aspirin Suppository 300 milliGRAM(s) Rectal once  ceFAZolin   IVPB 1000 milliGRAM(s) IV Intermittent every 8 hours  chlorhexidine 0.12% Liquid 5 milliLiter(s) Oral Mucosa every 12 hours  chlorhexidine 0.12% Liquid 15 milliLiter(s) Oral Mucosa every 12 hours  chlorhexidine 4% Liquid 1 Application(s) Topical <User Schedule>  dexmedetomidine Infusion 0.25 MICROgram(s)/kG/Hr (5.375 mL/Hr) IV Continuous <Continuous>  dextrose 50% Injectable 50 milliLiter(s) IV Push every 15 minutes  docusate sodium 100 milliGRAM(s) Oral three times a day  famotidine Injectable 20 milliGRAM(s) IV Push every 12 hours  guaiFENesin  milliGRAM(s) Oral every 12 hours  insulin regular Infusion 1 Unit(s)/Hr (1 mL/Hr) IV Continuous <Continuous>  ipratropium 17 MICROgram(s) HFA Inhaler 2 Puff(s) Inhalation every 6 hours  magnesium sulfate  IVPB 1 Gram(s) IV Intermittent every 12 hours  meperidine     Injectable 25 milliGRAM(s) IV Push once  niCARdipine Infusion 5 mG/Hr (25 mL/Hr) IV Continuous <Continuous>  nitroglycerin  Infusion 5 MICROgram(s)/Min (1.5 mL/Hr) IV Continuous <Continuous>  norepinephrine Infusion 0.05 MICROgram(s)/kG/Min (8.063 mL/Hr) IV Continuous <Continuous>  propofol Infusion 5 MICROgram(s)/kG/Min (2.58 mL/Hr) IV Continuous <Continuous>  senna 2 Tablet(s) Oral at bedtime  sodium chloride 0.9%. 1000 milliLiter(s) (20 mL/Hr) IV Continuous <Continuous>  vasopressin Infusion 0.04 Unit(s)/Min (2.4 mL/Hr) IV Continuous <Continuous>    MEDICATIONS  (PRN):  bisacodyl 5 milliGRAM(s) Oral every 12 hours PRN Constipation  ondansetron  IVPB 4 milliGRAM(s) IV Intermittent once PRN Nausea and/or Vomiting  oxyCODONE    IR 5 milliGRAM(s) Oral every 4 hours PRN Moderate Pain (4 - 6)  oxyCODONE    IR 10 milliGRAM(s) Oral every 4 hours PRN Severe Pain (7 - 10)    HEPARIN:  [] YES [x] NO  Dose: XX UNITS/HR UNITS Q8H  LOVENOX:[] YES [x] NO  Dose: XX mg Q24H  COUMADIN: []  YES [x] NO  Dose: XX mg  Q24H  SCD's: YES b/l  GI Prophylaxis: famotidine Injectable 20 milliGRAM(s) IV Push every 12 hours    Post-Op Beta-Blockers: Yes [], No[x], If No, then contraindication: On vasopressors, will start when weaned off  Post-Op Aspirin: Yes [x],  No [], If No, then contraindication:  Post-Op Statin: Yes [], No[x], If No, then contraindication: Will start today   Allergies    No Known Allergies    Intolerances      Ambulation/Activity Status: Ambulate out of bed to chair     Assessment/Plan:  64y Male status-post CABGx3 POD #1  - Case and plan discussed with CTU Intensivist and CT Surgeon - Dr. Burden   - Continue CTU supportive care    - Continue DVT/GI prophylaxis  - Incentive Spirometry 10 times an hour  - Continue to advance physical activity as tolerated and continue PT/OT as directed  1. CAD: Continue ASA, Will start Lipitor today, start BB when weaned off of vasopressors. will start isordil when bp can tolerate  2. HTN: cont nicardipine   3. A. Fib: mag sulfate 1gm   4. COPD/Hypoxia: cont duoneb, mucinex, wean O2 as tolerated   5. DM/Glucose Control: insulin regular Infusion 1 Unit(s)/Hr   6. Will D/C swan, chest tube   7. DVT ppx: will start Heparin   8. Hypovolemia: give 1 L albumin    Social Service Disposition:  pt to reassess

## 2019-07-24 NOTE — PROGRESS NOTE ADULT - SUBJECTIVE AND OBJECTIVE BOX
CTU Attending Progress Daily Note     24 Jul 2019 07:50  Admited 07-22-19, Hospital Day 2d  POD# - 1    HPI:  64 male with HTN presenting for elective LHC. Positive stress test, as per patient's daughter he could not finish a treadmill stress test. (22 Jul 2019 07:45)    Home Medications:  atorvastatin 20 mg oral tablet: 1 tab(s) orally once a day (22 Jul 2019 07:25)  metoprolol succinate 50 mg oral tablet, extended release: 1 tab(s) orally once a day (22 Jul 2019 07:25)  Plavix 75 mg oral tablet: 1 tab(s) orally once a day (22 Jul 2019 07:25)    FAMILY HISTORY:  No pertinent family history in first degree relatives    PAST MEDICAL & SURGICAL HISTORY:  Vertigo    Interval event for past 24 hr:  JAVIER CORONA  64y had no event.   Current Complains:  JAVIER CORONA has no new complains  Allergies    No Known Allergies    Intolerances      OBJECTIVE:  Vitals last 24 hrs  T(C): 37.6 (07-24-19 @ 07:00), Max: 37.8 (07-24-19 @ 05:00)  T(F): 99.7 (07-24-19 @ 07:00), Max: 100 (07-24-19 @ 05:00)  HR: 79 (07-24-19 @ 07:30) (58 - 96)  BP: 128/68 (07-24-19 @ 05:00) (105/57 - 135/80)  ABP: 107/51 (07-24-19 @ 07:30) (-24/-80 - 160/77)  ABP(mean): 70 (07-24-19 @ 07:30) (65 - 978)  RR: 40 (07-24-19 @ 07:30) (9 - 40)  SpO2: 95% (07-24-19 @ 07:30) (95% - 100%)  CVP(mm Hg): 13 (07-24-19 @ 07:30) (4 - 24)  CI: 2.5 (07-24-19 @ 07:00) (2.5 - 3.5)  PA: 31/13 (07-24-19 @ 07:30) (18/11 - 42/20)  PA(direct): --  PCWP: --  SVR: 1006 (07-24-19 @ 07:00) (230 - 6694)  PVR: --    07-23-19 @ 07:01  -  07-24-19 @ 07:00  --------------------------------------------------------  IN:    Albumin 5%  - 500 mL: 2000 mL    dexmedetomidine Infusion: 79.3 mL    insulin regular Infusion: 11 mL    IV PiggyBack: 250 mL    nitroglycerin  Infusion: 52 mL    nitroglycerin  Infusion: 55 mL    norepinephrine Infusion: 57 mL    Oral Fluid: 120 mL    sodium chloride 0.9%: 200 mL    sodium chloride 0.9%.: 300 mL  Total IN: 3124.3 mL    OUT:    Chest Tube: 380 mL    Chest Tube: 190 mL    Indwelling Catheter - Urethral: 938 mL    Nasoenteral Tube: 150 mL    Voided: 350 mL  Total OUT: 2008 mL    Total NET: 1116.3 mL      CAPILLARY BLOOD GLUCOSE  158 (24 Jul 2019 00:00)  141 (23 Jul 2019 22:30)      POCT Blood Glucose.: 147 mg/dL (24 Jul 2019 06:35)  POCT Blood Glucose.: 136 mg/dL (24 Jul 2019 05:11)  POCT Blood Glucose.: 135 mg/dL (24 Jul 2019 03:02)  POCT Blood Glucose.: 134 mg/dL (24 Jul 2019 02:07)  POCT Blood Glucose.: 130 mg/dL (23 Jul 2019 21:07)  POCT Blood Glucose.: 128 mg/dL (23 Jul 2019 19:13)    LABS:  ABG - ( 24 Jul 2019 04:34 )  pH, Arterial: 7.35  pH, Blood: x     /  pCO2: 40    /  pO2: 107   / HCO3: 22    / Base Excess: -3.4  /  SaO2: 98              Blood Gas Arterial, Lactate: 1.0 mmoL/L (07-24-19 @ 04:34)  Blood Gas Arterial, Lactate: 1.2 mmoL/L (07-23-19 @ 23:48)  Blood Gas Arterial, Lactate: 1.0 mmoL/L (07-23-19 @ 22:35)  Blood Gas Arterial, Lactate: 1.3 mmoL/L (07-23-19 @ 17:20)  Blood Gas Arterial, Lactate: 0.6 mmoL/L (07-23-19 @ 10:50)                          9.9    11.75 )-----------( 123      ( 24 Jul 2019 02:00 )             29.3     Hemoglobin: 9.9 g/dL (07-24 @ 02:00)  Hemoglobin: 11.4 g/dL (07-23 @ 17:22)  Hemoglobin: 11.5 g/dL (07-23 @ 15:22)  Hemoglobin: 15.7 g/dL (07-22 @ 12:03)  Hemoglobin: 10.4 g/dL (07-22 @ 07:29)    07-24    142  |  111<H>  |  14  ----------------------------<  144<H>  4.1   |  21  |  1.0    Ca    8.1<L>      24 Jul 2019 02:00  Mg     2.5     07-24    TPro  6.0  /  Alb  4.5  /  TBili  1.9<H>  /  DBili  x   /  AST  37  /  ALT  16  /  AlkPhos  57  07-24    Creatinine, Serum: 1.0 mg/dL (07-24 @ 02:00)  Creatinine, Serum: 1.0 mg/dL (07-23 @ 17:22)  Creatinine, Serum: 1.0 mg/dL (07-23 @ 04:20)  Creatinine, Serum: 1.0 mg/dL (07-22 @ 12:03)    PT/INR - ( 24 Jul 2019 02:00 )   PT: 17.10 sec;   INR: 1.49 ratio     PTT - ( 24 Jul 2019 02:00 )  PTT:31.3 sec  Urinalysis Basic - ( 23 Jul 2019 06:36 )    HOSPITAL MEDICATIONS:  MEDICATIONS  (STANDING):  albumin human  5% IVPB 3000 milliLiter(s) IV Intermittent once  ALBUTerol    90 MICROgram(s) HFA Inhaler 2 Puff(s) Inhalation every 6 hours  aspirin enteric coated 325 milliGRAM(s) Oral daily  aspirin Suppository 300 milliGRAM(s) Rectal once  ceFAZolin   IVPB 1000 milliGRAM(s) IV Intermittent every 8 hours  chlorhexidine 0.12% Liquid 5 milliLiter(s) Oral Mucosa every 12 hours  chlorhexidine 0.12% Liquid 15 milliLiter(s) Oral Mucosa every 12 hours  chlorhexidine 4% Liquid 1 Application(s) Topical <User Schedule>  dexmedetomidine Infusion 0.25 MICROgram(s)/kG/Hr (5.375 mL/Hr) IV Continuous <Continuous>  dextrose 50% Injectable 50 milliLiter(s) IV Push every 15 minutes  docusate sodium 100 milliGRAM(s) Oral three times a day  famotidine Injectable 20 milliGRAM(s) IV Push every 12 hours  guaiFENesin  milliGRAM(s) Oral every 12 hours  insulin regular Infusion 1 Unit(s)/Hr (1 mL/Hr) IV Continuous <Continuous>  ipratropium 17 MICROgram(s) HFA Inhaler 2 Puff(s) Inhalation every 6 hours  magnesium sulfate  IVPB 1 Gram(s) IV Intermittent every 12 hours  meperidine     Injectable 25 milliGRAM(s) IV Push once  niCARdipine Infusion 5 mG/Hr (25 mL/Hr) IV Continuous <Continuous>  nitroglycerin  Infusion 5 MICROgram(s)/Min (1.5 mL/Hr) IV Continuous <Continuous>  norepinephrine Infusion 0.05 MICROgram(s)/kG/Min (8.063 mL/Hr) IV Continuous <Continuous>  propofol Infusion 5 MICROgram(s)/kG/Min (2.58 mL/Hr) IV Continuous <Continuous>  senna 2 Tablet(s) Oral at bedtime  sodium chloride 0.9%. 1000 milliLiter(s) (20 mL/Hr) IV Continuous <Continuous>  vasopressin Infusion 0.04 Unit(s)/Min (2.4 mL/Hr) IV Continuous <Continuous>    MEDICATIONS  (PRN):  bisacodyl 5 milliGRAM(s) Oral every 12 hours PRN Constipation  ondansetron  IVPB 4 milliGRAM(s) IV Intermittent once PRN Nausea and/or Vomiting  oxyCODONE    IR 5 milliGRAM(s) Oral every 4 hours PRN Moderate Pain (4 - 6)  oxyCODONE    IR 10 milliGRAM(s) Oral every 4 hours PRN Severe Pain (7 - 10)      REVIEW OF SYSTEMS:  CONSTITUTIONAL: [X] all negative; [ ] weakness, [ ] fevers, [ ] chills  EYES/ENT: [X] all negative; [ ] visual changes, [ ] vertigo, [ ] throat pain, [ ] eye pain  NECK: [X] all negative; [ ] pain, [ ] stiffness  RESPIRATORY: [ ] all negative, [x ] cough, [ ] wheezing, [ ] hemoptysis, [x ] shortness of breath  CARDIOVASCULAR: [ ] all negative; [x ] chest pain, [ ] palpitations, [ ] orthopnea  GASTROINTESTINAL: [X] all negative; [ ]abdominal pain, [ ] nausea, [ ] vomiting, [ ] hematemesis, [ ] diarrhea, [ ] constipation, [ ] melena, [ ] hematochezia.  GENITOURINARY: [X] all negative; [ ] dysuria, [ ] frequency, [ ] hematuria  NEUROLOGICAL: [X] all negative; [ ] numbness, [ ] weakness, [ ] paresthesias  MUSCULOSKELETAL: [X] all negative, [ ] joint pain, [ ] joint swelling, [ ] joint redness, [ ] bone pain  SKIN: [X] all negative; [ ] itching, [ ] burning, [ ] rashes, [ ] lesions   All other review of systems is negative unless indicated above.    [  ] Unable to assess ROS because     PHYSICAL EXAM:          CONSTITUTIONAL: Well-developed; well-nourished; in no acute distress.   	SKIN: warm, dry, no rashes or lesions  	HEENT: Atraumatic. Normocephalic. PERRL. Moist membranes, no conj injection, sclera clear  	NECK: Supple; non tender.  No JVD. No lymphadenopathy.  	CARD: Normal S1, S2. Rate and Rhythm are regular. No murmurs.  	RESP: Good air entry bilaterally, no wheezes, no rales no rhonchi.  	ABD: Soft, not tender, not distended, no CVA ttp no rebound no guarding, bowel sounds present  	EXT: Normal ROM.  No clubbing, no cyanosis, no pedal edema, no calf pain b/l, Peripheral pulses intact.  	LYMPH: No acute cervical adenopathy.  	NEURO: Alert, awake, motor 5/5 R, 5/5 L, sensation intact bilat, CN 2-12 intact,          PSYCH: Cooperative, appropriate. Alert & oriented x 3    RADIOLOGY:  X Reviewed and interpreted by me  CxR from 07-24-19 shows mild congestion, no pneumothorax, no effusion, no cardiomegaly,   S-G Catheter in place, Chest Tubes in place,     ECG:  X Reviewed and interpreted by me - NSR CTU Attending Progress Daily Note     24 Jul 2019 07:50  Admited 07-22-19, Hospital Day 2d  POD# - 1    HPI:  64 male with HTN presenting for elective LHC. Positive stress test, as per patient's daughter he could not finish a treadmill stress test. (22 Jul 2019 07:45)    Home Medications:  atorvastatin 20 mg oral tablet: 1 tab(s) orally once a day (22 Jul 2019 07:25)  metoprolol succinate 50 mg oral tablet, extended release: 1 tab(s) orally once a day (22 Jul 2019 07:25)  Plavix 75 mg oral tablet: 1 tab(s) orally once a day (22 Jul 2019 07:25)    FAMILY HISTORY:  No pertinent family history in first degree relatives    PAST MEDICAL & SURGICAL HISTORY:  Vertigo    Interval event for past 24 hr:  JAVIER CORONA  64y had no event.   Current Complains:  JAVIER CORONA has no new complains  Allergies    No Known Allergies    Intolerances      OBJECTIVE:  Vitals last 24 hrs  T(C): 37.6 (07-24-19 @ 07:00), Max: 37.8 (07-24-19 @ 05:00)  T(F): 99.7 (07-24-19 @ 07:00), Max: 100 (07-24-19 @ 05:00)  HR: 79 (07-24-19 @ 07:30) (58 - 96)  BP: 128/68 (07-24-19 @ 05:00) (105/57 - 135/80)  ABP: 107/51 (07-24-19 @ 07:30) (-24/-80 - 160/77)  ABP(mean): 70 (07-24-19 @ 07:30) (65 - 978)  RR: 40 (07-24-19 @ 07:30) (9 - 40)  SpO2: 95% (07-24-19 @ 07:30) (95% - 100%)  CVP(mm Hg): 13 (07-24-19 @ 07:30) (4 - 24)  CI: 2.5 (07-24-19 @ 07:00) (2.5 - 3.5)  PA: 31/13 (07-24-19 @ 07:30) (18/11 - 42/20)  PA(direct): --  PCWP: --  SVR: 1006 (07-24-19 @ 07:00) (535 - 2445)  PVR: --    07-23-19 @ 07:01  -  07-24-19 @ 07:00  --------------------------------------------------------  IN:    Albumin 5%  - 500 mL: 2000 mL    dexmedetomidine Infusion: 79.3 mL    insulin regular Infusion: 11 mL    IV PiggyBack: 250 mL    nitroglycerin  Infusion: 52 mL    nitroglycerin  Infusion: 55 mL    norepinephrine Infusion: 57 mL    Oral Fluid: 120 mL    sodium chloride 0.9%: 200 mL    sodium chloride 0.9%.: 300 mL  Total IN: 3124.3 mL    OUT:    Chest Tube: 380 mL    Chest Tube: 190 mL    Indwelling Catheter - Urethral: 938 mL    Nasoenteral Tube: 150 mL    Voided: 350 mL  Total OUT: 2008 mL    Total NET: 1116.3 mL      CAPILLARY BLOOD GLUCOSE  158 (24 Jul 2019 00:00)  141 (23 Jul 2019 22:30)      POCT Blood Glucose.: 147 mg/dL (24 Jul 2019 06:35)  POCT Blood Glucose.: 136 mg/dL (24 Jul 2019 05:11)  POCT Blood Glucose.: 135 mg/dL (24 Jul 2019 03:02)  POCT Blood Glucose.: 134 mg/dL (24 Jul 2019 02:07)  POCT Blood Glucose.: 130 mg/dL (23 Jul 2019 21:07)  POCT Blood Glucose.: 128 mg/dL (23 Jul 2019 19:13)    LABS:  ABG - ( 24 Jul 2019 04:34 )  pH, Arterial: 7.35  pH, Blood: x     /  pCO2: 40    /  pO2: 107   / HCO3: 22    / Base Excess: -3.4  /  SaO2: 98              Blood Gas Arterial, Lactate: 1.0 mmoL/L (07-24-19 @ 04:34)  Blood Gas Arterial, Lactate: 1.2 mmoL/L (07-23-19 @ 23:48)  Blood Gas Arterial, Lactate: 1.0 mmoL/L (07-23-19 @ 22:35)  Blood Gas Arterial, Lactate: 1.3 mmoL/L (07-23-19 @ 17:20)  Blood Gas Arterial, Lactate: 0.6 mmoL/L (07-23-19 @ 10:50)                          9.9    11.75 )-----------( 123      ( 24 Jul 2019 02:00 )             29.3     Hemoglobin: 9.9 g/dL (07-24 @ 02:00)  Hemoglobin: 11.4 g/dL (07-23 @ 17:22)  Hemoglobin: 11.5 g/dL (07-23 @ 15:22)  Hemoglobin: 15.7 g/dL (07-22 @ 12:03)  Hemoglobin: 10.4 g/dL (07-22 @ 07:29)    07-24    142  |  111<H>  |  14  ----------------------------<  144<H>  4.1   |  21  |  1.0    Ca    8.1<L>      24 Jul 2019 02:00  Mg     2.5     07-24    TPro  6.0  /  Alb  4.5  /  TBili  1.9<H>  /  DBili  x   /  AST  37  /  ALT  16  /  AlkPhos  57  07-24    Creatinine, Serum: 1.0 mg/dL (07-24 @ 02:00)  Creatinine, Serum: 1.0 mg/dL (07-23 @ 17:22)  Creatinine, Serum: 1.0 mg/dL (07-23 @ 04:20)  Creatinine, Serum: 1.0 mg/dL (07-22 @ 12:03)    PT/INR - ( 24 Jul 2019 02:00 )   PT: 17.10 sec;   INR: 1.49 ratio     PTT - ( 24 Jul 2019 02:00 )  PTT:31.3 sec      HOSPITAL MEDICATIONS:  MEDICATIONS  (STANDING):  albumin human  5% IVPB 3000 milliLiter(s) IV Intermittent once  ALBUTerol    90 MICROgram(s) HFA Inhaler 2 Puff(s) Inhalation every 6 hours  aspirin enteric coated 325 milliGRAM(s) Oral daily  aspirin Suppository 300 milliGRAM(s) Rectal once  ceFAZolin   IVPB 1000 milliGRAM(s) IV Intermittent every 8 hours  chlorhexidine 0.12% Liquid 5 milliLiter(s) Oral Mucosa every 12 hours  chlorhexidine 0.12% Liquid 15 milliLiter(s) Oral Mucosa every 12 hours  chlorhexidine 4% Liquid 1 Application(s) Topical <User Schedule>  dexmedetomidine Infusion 0.25 MICROgram(s)/kG/Hr (5.375 mL/Hr) IV Continuous <Continuous>  dextrose 50% Injectable 50 milliLiter(s) IV Push every 15 minutes  docusate sodium 100 milliGRAM(s) Oral three times a day  famotidine Injectable 20 milliGRAM(s) IV Push every 12 hours  guaiFENesin  milliGRAM(s) Oral every 12 hours  insulin regular Infusion 1 Unit(s)/Hr (1 mL/Hr) IV Continuous <Continuous>  ipratropium 17 MICROgram(s) HFA Inhaler 2 Puff(s) Inhalation every 6 hours  magnesium sulfate  IVPB 1 Gram(s) IV Intermittent every 12 hours  meperidine     Injectable 25 milliGRAM(s) IV Push once  niCARdipine Infusion 5 mG/Hr (25 mL/Hr) IV Continuous <Continuous>  nitroglycerin  Infusion 5 MICROgram(s)/Min (1.5 mL/Hr) IV Continuous <Continuous>  norepinephrine Infusion 0.05 MICROgram(s)/kG/Min (8.063 mL/Hr) IV Continuous <Continuous>  propofol Infusion 5 MICROgram(s)/kG/Min (2.58 mL/Hr) IV Continuous <Continuous>  senna 2 Tablet(s) Oral at bedtime  sodium chloride 0.9%. 1000 milliLiter(s) (20 mL/Hr) IV Continuous <Continuous>  vasopressin Infusion 0.04 Unit(s)/Min (2.4 mL/Hr) IV Continuous <Continuous>    MEDICATIONS  (PRN):  bisacodyl 5 milliGRAM(s) Oral every 12 hours PRN Constipation  ondansetron  IVPB 4 milliGRAM(s) IV Intermittent once PRN Nausea and/or Vomiting  oxyCODONE    IR 5 milliGRAM(s) Oral every 4 hours PRN Moderate Pain (4 - 6)  oxyCODONE    IR 10 milliGRAM(s) Oral every 4 hours PRN Severe Pain (7 - 10)      REVIEW OF SYSTEMS:  CONSTITUTIONAL: [X] all negative; [ ] weakness, [ ] fevers, [ ] chills  EYES/ENT: [X] all negative; [ ] visual changes, [ ] vertigo, [ ] throat pain, [ ] eye pain  NECK: [X] all negative; [ ] pain, [ ] stiffness  RESPIRATORY: [ ] all negative, [x ] cough, [ ] wheezing, [ ] hemoptysis, [x ] shortness of breath  CARDIOVASCULAR: [ ] all negative; [x ] chest pain, [ ] palpitations, [ ] orthopnea  GASTROINTESTINAL: [X] all negative; [ ]abdominal pain, [ ] nausea, [ ] vomiting, [ ] hematemesis, [ ] diarrhea, [ ] constipation, [ ] melena, [ ] hematochezia.  GENITOURINARY: [X] all negative; [ ] dysuria, [ ] frequency, [ ] hematuria  NEUROLOGICAL: [X] all negative; [ ] numbness, [ ] weakness, [ ] paresthesias  MUSCULOSKELETAL: [X] all negative, [ ] joint pain, [ ] joint swelling, [ ] joint redness, [ ] bone pain  SKIN: [X] all negative; [ ] itching, [ ] burning, [ ] rashes, [ ] lesions   All other review of systems is negative unless indicated above.    [  ] Unable to assess ROS because     PHYSICAL EXAM:          CONSTITUTIONAL: Well-developed; well-nourished; in no acute distress.   	SKIN: warm, dry, no rashes or lesions  	HEENT: Atraumatic. Normocephalic. PERRL. Moist membranes, no conj injection, sclera clear  	NECK: Supple; non tender.  No JVD. No lymphadenopathy.  	CARD: Normal S1, S2. Rate and Rhythm are regular. No murmurs.  	RESP: Good air entry bilaterally, no wheezes, no rales no rhonchi.  	ABD: Soft, not tender, not distended, no CVA ttp no rebound no guarding, bowel sounds present  	EXT: Normal ROM.  No clubbing, no cyanosis, no pedal edema, no calf pain b/l, Peripheral pulses intact.  	LYMPH: No acute cervical adenopathy.  	NEURO: Alert, awake, motor 5/5 R, 5/5 L, sensation intact bilat, CN 2-12 intact,          PSYCH: Cooperative, appropriate. Alert & oriented x 3    RADIOLOGY:  X Reviewed and interpreted by me  CxR from 07-24-19 shows mild congestion, no pneumothorax, no effusion, no cardiomegaly,   S-G Catheter in place, Chest Tubes in place,     ECG:  X Reviewed and interpreted by me - NSR

## 2019-07-24 NOTE — PHYSICAL THERAPY INITIAL EVALUATION ADULT - PRECAUTIONS/LIMITATIONS, REHAB EVAL
oxygen therapy device and L/min/sternal precautions/fall precautions/2L/min supplemental O2 via nasal cannula

## 2019-07-24 NOTE — PHYSICAL THERAPY INITIAL EVALUATION ADULT - CRITERIA FOR SKILLED THERAPEUTIC INTERVENTIONS
functional limitations in following categories/anticipated equipment needs at discharge/anticipated discharge recommendation/rehab potential/therapy frequency/impairments found

## 2019-07-24 NOTE — PROGRESS NOTE ADULT - SUBJECTIVE AND OBJECTIVE BOX
SUBJECTIVE ASSESSMENT:  pt reports incisional pain    Vital Signs Last 24 Hrs  T(C): 37.4 (2019 10:00), Max: 37.8 (2019 05:00)  T(F): 99.3 (2019 10:00), Max: 100 (2019 05:00)  HR: 73 (2019 14:00) (72 - 96)  BP: 96/57 (2019 14:00) (87/53 - 128/68)  BP(mean): 71 (2019 14:00) (65 - 94)  RR: 22 (2019 11:00) (9 - 40)  SpO2: 96% (2019 14:00) (91% - 100%)  19 @ 07:01  -  19 @ 07:00  --------------------------------------------------------  IN: 3156.7 mL / OUT: 2008 mL / NET: 1148.7 mL    19 @ 07:01  -  19 @ 15:00  --------------------------------------------------------  IN: 635.6 mL / OUT: 209 mL / NET: 426.6 mL    A&OX3 in NAD  CTA bilat  sternum stable, no drainage  nl s1, s2  abd soft/NT/ND  no peripheral edema  SVG harvest site is healing well  radial artery harvest site is healing well    LABS:                        9.9    11.75 )-----------( 123      ( 2019 02:00 )             29.3     PT/INR - ( 2019 02:00 )   PT: 17.10 sec;   INR: 1.49 ratio      PTT - ( 2019 02:00 )  PTT:31.3 sec      142  |  111<H>  |  14  ----------------------------<  144<H>  4.1   |  21  |  1.0    Ca    8.1<L>      2019 02:00  Mg     2.5     -    TPro  6.0  /  Alb  4.5  /  TBili  1.9<H>  /  DBili  x   /  AST  37  /  ALT  16  /  AlkPhos  57  -    Urinalysis Basic - ( 2019 06:36 )    Color: Yellow / Appearance: Clear / S.019 / pH: x  Gluc: x / Ketone: Trace  / Bili: Negative / Urobili: <2 mg/dL   Blood: x / Protein: Negative / Nitrite: Negative   Leuk Esterase: Negative / RBC: x / WBC x   Sq Epi: x / Non Sq Epi: x / Bacteria: x    MEDICATIONS  (STANDING):  albumin human  5% IVPB 500 milliLiter(s) IV Intermittent once  albumin human  5% IVPB 3000 milliLiter(s) IV Intermittent once  ALBUTerol    90 MICROgram(s) HFA Inhaler 2 Puff(s) Inhalation every 6 hours  aspirin enteric coated 325 milliGRAM(s) Oral daily  atorvastatin 40 milliGRAM(s) Oral at bedtime  ceFAZolin   IVPB 1000 milliGRAM(s) IV Intermittent every 8 hours  docusate sodium 100 milliGRAM(s) Oral three times a day  famotidine Injectable 20 milliGRAM(s) IV Push every 12 hours  guaiFENesin  milliGRAM(s) Oral every 12 hours  heparin  Injectable 5000 Unit(s) SubCutaneous every 8 hours  insulin regular Infusion 1 Unit(s)/Hr (1 mL/Hr) IV Continuous <Continuous>  ipratropium 17 MICROgram(s) HFA Inhaler 2 Puff(s) Inhalation every 6 hours  magnesium sulfate  IVPB 1 Gram(s) IV Intermittent every 12 hours  meperidine     Injectable 25 milliGRAM(s) IV Push once  niCARdipine Infusion 5 mG/Hr (25 mL/Hr) IV Continuous <Continuous>  nitroglycerin  Infusion 5 MICROgram(s)/Min (1.5 mL/Hr) IV Continuous <Continuous>  senna 2 Tablet(s) Oral at bedtime    MEDICATIONS  (PRN):  bisacodyl 5 milliGRAM(s) Oral every 12 hours PRN Constipation  ondansetron  IVPB 4 milliGRAM(s) IV Intermittent once PRN Nausea and/or Vomiting  oxyCODONE    IR 5 milliGRAM(s) Oral every 4 hours PRN Moderate Pain (4 - 6)  oxyCODONE    IR 10 milliGRAM(s) Oral every 4 hours PRN Severe Pain (7 - 10)

## 2019-07-25 LAB
ALBUMIN SERPL ELPH-MCNC: 4.4 G/DL — SIGNIFICANT CHANGE UP (ref 3.5–5.2)
ALP SERPL-CCNC: 76 U/L — SIGNIFICANT CHANGE UP (ref 30–115)
ALT FLD-CCNC: 29 U/L — SIGNIFICANT CHANGE UP (ref 0–41)
ANION GAP SERPL CALC-SCNC: 9 MMOL/L — SIGNIFICANT CHANGE UP (ref 7–14)
APTT BLD: 42.6 SEC — HIGH (ref 27–39.2)
AST SERPL-CCNC: 39 U/L — SIGNIFICANT CHANGE UP (ref 0–41)
BASOPHILS # BLD AUTO: 0.02 K/UL — SIGNIFICANT CHANGE UP (ref 0–0.2)
BASOPHILS NFR BLD AUTO: 0.2 % — SIGNIFICANT CHANGE UP (ref 0–1)
BILIRUB SERPL-MCNC: 1.7 MG/DL — HIGH (ref 0.2–1.2)
BUN SERPL-MCNC: 16 MG/DL — SIGNIFICANT CHANGE UP (ref 10–20)
CALCIUM SERPL-MCNC: 8.2 MG/DL — LOW (ref 8.5–10.1)
CHLORIDE SERPL-SCNC: 106 MMOL/L — SIGNIFICANT CHANGE UP (ref 98–110)
CO2 SERPL-SCNC: 22 MMOL/L — SIGNIFICANT CHANGE UP (ref 17–32)
CREAT SERPL-MCNC: 0.8 MG/DL — SIGNIFICANT CHANGE UP (ref 0.7–1.5)
EOSINOPHIL # BLD AUTO: 0 K/UL — SIGNIFICANT CHANGE UP (ref 0–0.7)
EOSINOPHIL NFR BLD AUTO: 0 % — SIGNIFICANT CHANGE UP (ref 0–8)
GAS PNL BLDA: SIGNIFICANT CHANGE UP
GLUCOSE BLDC GLUCOMTR-MCNC: 105 MG/DL — HIGH (ref 70–99)
GLUCOSE BLDC GLUCOMTR-MCNC: 113 MG/DL — HIGH (ref 70–99)
GLUCOSE BLDC GLUCOMTR-MCNC: 116 MG/DL — HIGH (ref 70–99)
GLUCOSE BLDC GLUCOMTR-MCNC: 118 MG/DL — HIGH (ref 70–99)
GLUCOSE BLDC GLUCOMTR-MCNC: 131 MG/DL — HIGH (ref 70–99)
GLUCOSE BLDC GLUCOMTR-MCNC: 137 MG/DL — HIGH (ref 70–99)
GLUCOSE BLDC GLUCOMTR-MCNC: 146 MG/DL — HIGH (ref 70–99)
GLUCOSE SERPL-MCNC: 124 MG/DL — HIGH (ref 70–99)
HCT VFR BLD CALC: 27.7 % — LOW (ref 42–52)
HGB BLD-MCNC: 9.1 G/DL — LOW (ref 14–18)
IMM GRANULOCYTES NFR BLD AUTO: 0.4 % — HIGH (ref 0.1–0.3)
INR BLD: 1.46 RATIO — HIGH (ref 0.65–1.3)
LYMPHOCYTES # BLD AUTO: 1.63 K/UL — SIGNIFICANT CHANGE UP (ref 1.2–3.4)
LYMPHOCYTES # BLD AUTO: 15 % — LOW (ref 20.5–51.1)
MAGNESIUM SERPL-MCNC: 2.5 MG/DL — HIGH (ref 1.8–2.4)
MCHC RBC-ENTMCNC: 30.5 PG — SIGNIFICANT CHANGE UP (ref 27–31)
MCHC RBC-ENTMCNC: 32.9 G/DL — SIGNIFICANT CHANGE UP (ref 32–37)
MCV RBC AUTO: 93 FL — SIGNIFICANT CHANGE UP (ref 80–94)
MONOCYTES # BLD AUTO: 0.99 K/UL — HIGH (ref 0.1–0.6)
MONOCYTES NFR BLD AUTO: 9.1 % — SIGNIFICANT CHANGE UP (ref 1.7–9.3)
NEUTROPHILS # BLD AUTO: 8.2 K/UL — HIGH (ref 1.4–6.5)
NEUTROPHILS NFR BLD AUTO: 75.3 % — HIGH (ref 42.2–75.2)
NRBC # BLD: 0 /100 WBCS — SIGNIFICANT CHANGE UP (ref 0–0)
PLATELET # BLD AUTO: 98 K/UL — LOW (ref 130–400)
POTASSIUM SERPL-MCNC: 4.1 MMOL/L — SIGNIFICANT CHANGE UP (ref 3.5–5)
POTASSIUM SERPL-SCNC: 4.1 MMOL/L — SIGNIFICANT CHANGE UP (ref 3.5–5)
PROT SERPL-MCNC: 5.9 G/DL — LOW (ref 6–8)
PROTHROM AB SERPL-ACNC: 16.7 SEC — HIGH (ref 9.95–12.87)
RBC # BLD: 2.98 M/UL — LOW (ref 4.7–6.1)
RBC # FLD: 12.1 % — SIGNIFICANT CHANGE UP (ref 11.5–14.5)
SODIUM SERPL-SCNC: 137 MMOL/L — SIGNIFICANT CHANGE UP (ref 135–146)
WBC # BLD: 10.88 K/UL — HIGH (ref 4.8–10.8)
WBC # FLD AUTO: 10.88 K/UL — HIGH (ref 4.8–10.8)

## 2019-07-25 PROCEDURE — 71045 X-RAY EXAM CHEST 1 VIEW: CPT | Mod: 26

## 2019-07-25 PROCEDURE — 99233 SBSQ HOSP IP/OBS HIGH 50: CPT

## 2019-07-25 PROCEDURE — 93010 ELECTROCARDIOGRAM REPORT: CPT

## 2019-07-25 RX ORDER — ISOSORBIDE DINITRATE 5 MG/1
5 TABLET ORAL THREE TIMES A DAY
Refills: 0 | Status: DISCONTINUED | OUTPATIENT
Start: 2019-07-25 | End: 2019-07-29

## 2019-07-25 RX ORDER — FUROSEMIDE 40 MG
40 TABLET ORAL ONCE
Refills: 0 | Status: COMPLETED | OUTPATIENT
Start: 2019-07-25 | End: 2019-07-25

## 2019-07-25 RX ORDER — VASOPRESSIN 20 [USP'U]/ML
0.04 INJECTION INTRAVENOUS
Qty: 50 | Refills: 0 | Status: DISCONTINUED | OUTPATIENT
Start: 2019-07-25 | End: 2019-07-26

## 2019-07-25 RX ORDER — SIMETHICONE 80 MG/1
80 TABLET, CHEWABLE ORAL
Refills: 0 | Status: DISCONTINUED | OUTPATIENT
Start: 2019-07-25 | End: 2019-07-29

## 2019-07-25 RX ORDER — METOPROLOL TARTRATE 50 MG
12.5 TABLET ORAL EVERY 12 HOURS
Refills: 0 | Status: DISCONTINUED | OUTPATIENT
Start: 2019-07-25 | End: 2019-07-26

## 2019-07-25 RX ORDER — INSULIN GLARGINE 100 [IU]/ML
20 INJECTION, SOLUTION SUBCUTANEOUS EVERY MORNING
Refills: 0 | Status: DISCONTINUED | OUTPATIENT
Start: 2019-07-25 | End: 2019-07-26

## 2019-07-25 RX ORDER — INSULIN LISPRO 100/ML
5 VIAL (ML) SUBCUTANEOUS
Refills: 0 | Status: DISCONTINUED | OUTPATIENT
Start: 2019-07-25 | End: 2019-07-26

## 2019-07-25 RX ORDER — SODIUM CHLORIDE 9 MG/ML
1000 INJECTION, SOLUTION INTRAVENOUS
Refills: 0 | Status: DISCONTINUED | OUTPATIENT
Start: 2019-07-25 | End: 2019-07-27

## 2019-07-25 RX ORDER — GLUCAGON INJECTION, SOLUTION 0.5 MG/.1ML
1 INJECTION, SOLUTION SUBCUTANEOUS ONCE
Refills: 0 | Status: DISCONTINUED | OUTPATIENT
Start: 2019-07-25 | End: 2019-07-27

## 2019-07-25 RX ORDER — NITROGLYCERIN 6.5 MG
5 CAPSULE, EXTENDED RELEASE ORAL
Qty: 50 | Refills: 0 | Status: DISCONTINUED | OUTPATIENT
Start: 2019-07-25 | End: 2019-07-26

## 2019-07-25 RX ORDER — POTASSIUM CHLORIDE 20 MEQ
20 PACKET (EA) ORAL ONCE
Refills: 0 | Status: COMPLETED | OUTPATIENT
Start: 2019-07-25 | End: 2019-07-25

## 2019-07-25 RX ORDER — KETOROLAC TROMETHAMINE 30 MG/ML
30 SYRINGE (ML) INJECTION EVERY 6 HOURS
Refills: 0 | Status: DISCONTINUED | OUTPATIENT
Start: 2019-07-25 | End: 2019-07-25

## 2019-07-25 RX ORDER — DEXTROSE 50 % IN WATER 50 %
15 SYRINGE (ML) INTRAVENOUS ONCE
Refills: 0 | Status: DISCONTINUED | OUTPATIENT
Start: 2019-07-25 | End: 2019-07-27

## 2019-07-25 RX ORDER — INSULIN LISPRO 100/ML
VIAL (ML) SUBCUTANEOUS
Refills: 0 | Status: DISCONTINUED | OUTPATIENT
Start: 2019-07-25 | End: 2019-07-27

## 2019-07-25 RX ADMIN — OXYCODONE HYDROCHLORIDE 10 MILLIGRAM(S): 5 TABLET ORAL at 08:00

## 2019-07-25 RX ADMIN — Medication 100 MILLIGRAM(S): at 05:27

## 2019-07-25 RX ADMIN — SIMETHICONE 80 MILLIGRAM(S): 80 TABLET, CHEWABLE ORAL at 17:12

## 2019-07-25 RX ADMIN — Medication 600 MILLIGRAM(S): at 05:27

## 2019-07-25 RX ADMIN — Medication 12.5 MILLIGRAM(S): at 18:12

## 2019-07-25 RX ADMIN — FAMOTIDINE 20 MILLIGRAM(S): 10 INJECTION INTRAVENOUS at 12:27

## 2019-07-25 RX ADMIN — OXYCODONE HYDROCHLORIDE 10 MILLIGRAM(S): 5 TABLET ORAL at 01:02

## 2019-07-25 RX ADMIN — ISOSORBIDE DINITRATE 5 MILLIGRAM(S): 5 TABLET ORAL at 21:55

## 2019-07-25 RX ADMIN — HEPARIN SODIUM 5000 UNIT(S): 5000 INJECTION INTRAVENOUS; SUBCUTANEOUS at 21:55

## 2019-07-25 RX ADMIN — HEPARIN SODIUM 5000 UNIT(S): 5000 INJECTION INTRAVENOUS; SUBCUTANEOUS at 05:28

## 2019-07-25 RX ADMIN — INSULIN GLARGINE 20 UNIT(S): 100 INJECTION, SOLUTION SUBCUTANEOUS at 10:48

## 2019-07-25 RX ADMIN — OXYCODONE HYDROCHLORIDE 10 MILLIGRAM(S): 5 TABLET ORAL at 08:30

## 2019-07-25 RX ADMIN — Medication 30 MILLIGRAM(S): at 09:30

## 2019-07-25 RX ADMIN — ATORVASTATIN CALCIUM 40 MILLIGRAM(S): 80 TABLET, FILM COATED ORAL at 21:53

## 2019-07-25 RX ADMIN — Medication 100 GRAM(S): at 05:27

## 2019-07-25 RX ADMIN — Medication 600 MILLIGRAM(S): at 18:12

## 2019-07-25 RX ADMIN — Medication 12.5 MILLIGRAM(S): at 06:49

## 2019-07-25 RX ADMIN — SENNA PLUS 2 TABLET(S): 8.6 TABLET ORAL at 21:55

## 2019-07-25 RX ADMIN — HEPARIN SODIUM 5000 UNIT(S): 5000 INJECTION INTRAVENOUS; SUBCUTANEOUS at 16:58

## 2019-07-25 RX ADMIN — Medication 100 MILLIGRAM(S): at 13:45

## 2019-07-25 RX ADMIN — OXYCODONE HYDROCHLORIDE 10 MILLIGRAM(S): 5 TABLET ORAL at 00:00

## 2019-07-25 RX ADMIN — Medication 20 MILLIEQUIVALENT(S): at 12:27

## 2019-07-25 RX ADMIN — Medication 325 MILLIGRAM(S): at 12:27

## 2019-07-25 RX ADMIN — Medication 30 MILLIGRAM(S): at 09:00

## 2019-07-25 RX ADMIN — ISOSORBIDE DINITRATE 5 MILLIGRAM(S): 5 TABLET ORAL at 10:49

## 2019-07-25 RX ADMIN — Medication 100 MILLIGRAM(S): at 21:53

## 2019-07-25 RX ADMIN — Medication 40 MILLIGRAM(S): at 11:30

## 2019-07-25 RX ADMIN — Medication 100 GRAM(S): at 18:02

## 2019-07-25 RX ADMIN — ISOSORBIDE DINITRATE 5 MILLIGRAM(S): 5 TABLET ORAL at 16:58

## 2019-07-25 NOTE — PROGRESS NOTE ADULT - SUBJECTIVE AND OBJECTIVE BOX
OPERATIVE PROCEDURE(s):                POD #                       64yMale  SURGEON(s): MARIE Tinoco  SUBJECTIVE ASSESSMENT:   Vital Signs Last 24 Hrs  T(F): 99.6 (2019 06:00), Max: 99.8 (2019 19:00)  HR: 80 (2019 07:00) (72 - 84)  BP: 111/66 (2019 16:00) (87/53 - 111/66)  BP(mean): 83 (2019 16:00) (65 - 83)  ABP: 129/58 (2019 07:00) (-1/-8 - 149/67)  ABP(mean): 80 (2019 07:00)  RR: 29 (2019 07:00) (14 - 35)  SpO2: 92% (2019 07:00) (91% - 99%)  CVP(mm Hg): 11 (2019 09:30)  CVP(cm H2O): --  CO: 4.9 (2019 08:00)  CI: 2.4 (2019 08:00)  PA: 30/15 (2019 09:30)  SVR: 962 (2019 08:00)    I&O's Detail    2019 07:01  -  2019 07:00  --------------------------------------------------------  IN:    Albumin 5%  - 500 mL: 1500 mL    insulin regular Infusion: 44 mL    IV PiggyBack: 250 mL    nitroglycerin  Infusion: 4 mL    Oral Fluid: 270 mL    sodium chloride 0.9%.: 480 mL    vasopressin Infusion: 21.6 mL  Total IN: 2569.6 mL    OUT:    Chest Tube: 75 mL    Chest Tube: 35 mL    Indwelling Catheter - Urethral: 764 mL  Total OUT: 874 mL        Net:   I&O's Detail    2019 07:01  -  2019 07:00  --------------------------------------------------------  Total NET: 1148.7 mL      2019 07:01  -  2019 07:00  --------------------------------------------------------  Total NET: 1695.6 mL        CAPILLARY BLOOD GLUCOSE  154 (2019 08:00)      POCT Blood Glucose.: 146 mg/dL (2019 06:31)  POCT Blood Glucose.: 105 mg/dL (2019 02:13)  POCT Blood Glucose.: 127 mg/dL (2019 23:54)  POCT Blood Glucose.: 118 mg/dL (2019 21:22)  POCT Blood Glucose.: 115 mg/dL (2019 16:31)  POCT Blood Glucose.: 134 mg/dL (2019 09:33)  POCT Blood Glucose.: 154 mg/dL (2019 08:11)    Physical Exam:  General: NAD; A&Ox3/Patient is intubated and sedated  Cardiac: S1/S2, RRR, no murmur, no rubs  Lungs: unlabored respirations, CTA b/l, no wheeze, no rales, no crackles  Abdomen: Soft/NT/ND; positive bowel sounds x 4  Sternum: Intact, no click, incision healing well with no drainage  Incisions: Incisions clean/dry/intact  Extremities: No edema b/l lower extremities; good capillary refill; no cyanosis; palpable 1+ pedal pulses b/l    Central Venous Catheter: Yes[]  No[] , If Yes indication:           Day #  Tse Catheter: Yes  [] , No  [] , If yes indication:                      Day #  NGT: Yes [] No [] ,    If Yes Placement:                                     Day #  EPICARDIAL WIRES:  [] YES [] NO                                              Day #  BOWEL MOVEMENT:  [] YES [] NO, If No, Timing since last BM:      Day #  CHEST TUBE(Left/Right):  [] YES [] NO, If yes -  AIR LEAKS:  [] YES [] NO        LABS:                        9.1<L>  10.88<H> )-----------( 98<L>    ( 2019 01:00 )             27.7<L>                        9.9<L>  11.75<H> )-----------( 123<L>    ( 2019 02:00 )             29.3<L>        137  |  106  |  16  ----------------------------<  124<H>  4.1   |  22  |  0.8      142  |  111<H>  |  14  ----------------------------<  144<H>  4.1   |  21  |  1.0    Ca    8.2<L>      2019 01:00  Mg     2.5         TPro  5.9<L> [6.0 - 8.0]  /  Alb  4.4 [3.5 - 5.2]  /  TBili  1.7<H> [0.2 - 1.2]  /  DBili  x   /  AST  39 [0 - 41]  /  ALT  29 [0 - 41]  /  AlkPhos  76 [30 - 115]      PT/INR - ( 2019 01:00 )   PT: ;   INR: 1.46 ratio         PT/INR - ( 2019 02:00 )   PT: ;   INR: 1.49 ratio         PTT - ( 2019 01:00 )  PTT:42.6 sec, PTT - ( 2019 02:00 )  PTT:31.3 sec  Urinalysis Basic - ( 2019 06:36 )    Color: Yellow / Appearance: Clear / S.019 / pH: x  Gluc: x / Ketone: Trace  / Bili: Negative / Urobili: <2 mg/dL   Blood: x / Protein: Negative / Nitrite: Negative   Leuk Esterase: Negative / RBC: x / WBC x   Sq Epi: x / Non Sq Epi: x / Bacteria: x      ABG - ( 2019 05:22 )  pH: 7.38  /  pCO2: 38    /  pO2: 73    / HCO3: 22    / Base Excess: -2.4  /  SaO2: 94    /  LA: 0.9              RADIOLOGY & ADDITIONAL TESTS:  CXR:  EKG:  MEDICATIONS  (STANDING):  albumin human  5% IVPB 3000 milliLiter(s) IV Intermittent once  ALBUTerol/ipratropium for Nebulization 3 milliLiter(s) Nebulizer every 6 hours  aspirin enteric coated 325 milliGRAM(s) Oral daily  aspirin Suppository 300 milliGRAM(s) Rectal once  atorvastatin 40 milliGRAM(s) Oral at bedtime  chlorhexidine 4% Liquid 1 Application(s) Topical <User Schedule>  dextrose 50% Injectable 50 milliLiter(s) IV Push every 15 minutes  docusate sodium 100 milliGRAM(s) Oral three times a day  famotidine    Tablet 20 milliGRAM(s) Oral daily  guaiFENesin  milliGRAM(s) Oral every 12 hours  heparin  Injectable 5000 Unit(s) SubCutaneous every 8 hours  insulin regular Infusion 1 Unit(s)/Hr (1 mL/Hr) IV Continuous <Continuous>  magnesium sulfate  IVPB 1 Gram(s) IV Intermittent every 12 hours  meperidine     Injectable 25 milliGRAM(s) IV Push once  metoprolol tartrate 12.5 milliGRAM(s) Oral every 12 hours  nitroglycerin  Infusion 5 MICROgram(s)/Min (1.5 mL/Hr) IV Continuous <Continuous>  senna 2 Tablet(s) Oral at bedtime  sodium chloride 0.9%. 1000 milliLiter(s) (20 mL/Hr) IV Continuous <Continuous>  vasopressin Infusion 0.04 Unit(s)/Min (2.4 mL/Hr) IV Continuous <Continuous>    MEDICATIONS  (PRN):  bisacodyl 5 milliGRAM(s) Oral every 12 hours PRN Constipation  oxyCODONE    IR 5 milliGRAM(s) Oral every 4 hours PRN Moderate Pain (4 - 6)  oxyCODONE    IR 10 milliGRAM(s) Oral every 4 hours PRN Severe Pain (7 - 10)    HEPARIN:  [] YES [] NO  Dose: XX UNITS/HR UNITS Q8H  LOVENOX:[] YES [] NO  Dose: XX mg Q24H  COUMADIN: []  YES [] NO  Dose: XX mg  Q24H  SCD's: YES b/l  GI Prophylaxis: Protonix [], Pepcid [], None [], (Contra-indication:.....)    Post-Op Beta-Blockers: Yes [], No[], If No, then contraindication:  Post-Op Aspirin: Yes [],  No [], If No, then contraindication:  Post-Op Statin: Yes [], No[], If No, then contraindication:  Allergies    No Known Allergies    Intolerances      Ambulation/Activity Status:    Assessment/Plan:  64y Male status-post .....  - Case and plan discussed with CTU Intensivist and CT Surgeon - Dr. Odonnell/Levy/Jenise   - Continue CTU supportive care    - Continue DVT/GI prophylaxis  - Incentive Spirometry 10 times an hour  - Continue to advance physical activity as tolerated and continue PT/OT as directed  1. CAD: Continue ASA, statin, BB  2. HTN:   3. A. Fib:   4. COPD/Hypoxia:   5. DM/Glucose Control:     Social Service Disposition: OPERATIVE PROCEDURE(s):    CABGx3            POD #  2                     64yMale  SURGEON(s): Dr. Burden  SUBJECTIVE ASSESSMENT: Pt seen and examined at bedside. Has no complaints at this time.  Vital Signs Last 24 Hrs  T(F): 99.6 (2019 06:00), Max: 99.8 (2019 19:00)  HR: 80 (2019 07:00) (72 - 84)  BP: 111/66 (2019 16:00) (87/53 - 111/66)  BP(mean): 83 (2019 16:00) (65 - 83)  ABP: 129/58 (2019 07:00) (-1/-8 - 149/67)  ABP(mean): 80 (2019 07:00)  RR: 29 (2019 07:00) (14 - 35)  SpO2: 92% (2019 07:00) (91% - 99%)  CVP(mm Hg): 11 (2019 09:30)  CO: 4.9 (2019 08:00)  CI: 2.4 (2019 08:00)  PA: 30/15 (2019 09:30)  SVR: 962 (2019 08:00)    I&O's Detail    2019 07:  -  2019 07:00  --------------------------------------------------------  IN:    Albumin 5%  - 500 mL: 1500 mL    insulin regular Infusion: 44 mL    IV PiggyBack: 250 mL    nitroglycerin  Infusion: 4 mL    Oral Fluid: 270 mL    sodium chloride 0.9%.: 480 mL    vasopressin Infusion: 21.6 mL  Total IN: 2569.6 mL    OUT:    Chest Tube: 75 mL    Chest Tube: 35 mL    Indwelling Catheter - Urethral: 764 mL  Total OUT: 874 mL        Net:   I&O's Detail    2019 07:01  -  2019 07:00  --------------------------------------------------------  Total NET: 1148.7 mL      2019 07:01  -  2019 07:00  --------------------------------------------------------  Total NET: 1695.6 mL        CAPILLARY BLOOD GLUCOSE  154 (2019 08:00)      POCT Blood Glucose.: 146 mg/dL (2019 06:31)  POCT Blood Glucose.: 105 mg/dL (2019 02:13)  POCT Blood Glucose.: 127 mg/dL (2019 23:54)  POCT Blood Glucose.: 118 mg/dL (2019 21:22)  POCT Blood Glucose.: 115 mg/dL (2019 16:31)  POCT Blood Glucose.: 134 mg/dL (2019 09:33)  POCT Blood Glucose.: 154 mg/dL (2019 08:11)    Physical Exam:  General: NAD; A&Ox3  Cardiac: S1/S2, RRR, no murmur, no rubs  Lungs: unlabored respirations, CTA b/l, no wheeze, no rales, no crackles  Abdomen: Soft/NT/ND; positive bowel sounds x 4  Sternum: Intact, no click, incision healing well with no drainage  Incisions: Incisions clean/dry/intact  Extremities: No edema b/l lower extremities; good capillary refill; no cyanosis; palpable 1+ pedal pulses b/l    Central Venous Catheter: Yes[x]  No[] , If Yes indication: hemodynamically unstable         Day # 2  Tse Catheter: Yes  [x] , No  [] , If yes indication: Monitor strict in/out                      Day # 2  NGT: Yes [] No [x] ,    If Yes Placement:                                     Day #  EPICARDIAL WIRES:  [x] YES [] NO                                              Day # 2  BOWEL MOVEMENT:  [] YES [x] NO, If No, Timing since last BM:      Day #  CHEST TUBE(Left/Right):  [] YES [x] NO, If yes -  AIR LEAKS:  [] YES [] NO        LABS:                        9.1<L>  10.88<H> )-----------( 98<L>    ( 2019 01:00 )             27.7<L>                        9.9<L>  11.75<H> )-----------( 123<L>    ( 2019 02:00 )             29.3<L>        137  |  106  |  16  ----------------------------<  124<H>  4.1   |  22  |  0.8      142  |  111<H>  |  14  ----------------------------<  144<H>  4.1   |  21  |  1.0    Ca    8.2<L>      2019 01:00  Mg     2.5         TPro  5.9<L> [6.0 - 8.0]  /  Alb  4.4 [3.5 - 5.2]  /  TBili  1.7<H> [0.2 - 1.2]  /  DBili  x   /  AST  39 [0 - 41]  /  ALT  29 [0 - 41]  /  AlkPhos  76 [30 - 115]      PT/INR - ( 2019 01:00 )   PT: ;   INR: 1.46 ratio         PT/INR - ( 2019 02:00 )   PT: ;   INR: 1.49 ratio         PTT - ( 2019 01:00 )  PTT:42.6 sec, PTT - ( 2019 02:00 )  PTT:31.3 sec  Urinalysis Basic - ( 2019 06:36 )    Color: Yellow / Appearance: Clear / S.019 / pH: x  Gluc: x / Ketone: Trace  / Bili: Negative / Urobili: <2 mg/dL   Blood: x / Protein: Negative / Nitrite: Negative   Leuk Esterase: Negative / RBC: x / WBC x   Sq Epi: x / Non Sq Epi: x / Bacteria: x      ABG - ( 2019 05:22 )  pH: 7.38  /  pCO2: 38    /  pO2: 73    / HCO3: 22    / Base Excess: -2.4  /  SaO2: 94    /  LA: 0.9              RADIOLOGY & ADDITIONAL TESTS:  CXR: < from: Xray Chest 1 View- PORTABLE-Routine (19 @ 04:23) >  EXAM:  XR CHEST PORTABLE ROUTINE 1V            PROCEDURE DATE:  2019      < end of copied text >  < from: Xray Chest 1 View- PORTABLE-Routine (19 @ 04:23) >  Impression:      Stable small left basilar opacity.      < end of copied text >    MEDICATIONS  (STANDING):  albumin human  5% IVPB 3000 milliLiter(s) IV Intermittent once  ALBUTerol/ipratropium for Nebulization 3 milliLiter(s) Nebulizer every 6 hours  aspirin enteric coated 325 milliGRAM(s) Oral daily  aspirin Suppository 300 milliGRAM(s) Rectal once  atorvastatin 40 milliGRAM(s) Oral at bedtime  chlorhexidine 4% Liquid 1 Application(s) Topical <User Schedule>  dextrose 50% Injectable 50 milliLiter(s) IV Push every 15 minutes  docusate sodium 100 milliGRAM(s) Oral three times a day  famotidine    Tablet 20 milliGRAM(s) Oral daily  guaiFENesin  milliGRAM(s) Oral every 12 hours  heparin  Injectable 5000 Unit(s) SubCutaneous every 8 hours  insulin regular Infusion 1 Unit(s)/Hr (1 mL/Hr) IV Continuous <Continuous>  magnesium sulfate  IVPB 1 Gram(s) IV Intermittent every 12 hours  meperidine     Injectable 25 milliGRAM(s) IV Push once  metoprolol tartrate 12.5 milliGRAM(s) Oral every 12 hours  nitroglycerin  Infusion 5 MICROgram(s)/Min (1.5 mL/Hr) IV Continuous <Continuous>  senna 2 Tablet(s) Oral at bedtime  sodium chloride 0.9%. 1000 milliLiter(s) (20 mL/Hr) IV Continuous <Continuous>  vasopressin Infusion 0.04 Unit(s)/Min (2.4 mL/Hr) IV Continuous <Continuous>    MEDICATIONS  (PRN):  bisacodyl 5 milliGRAM(s) Oral every 12 hours PRN Constipation  oxyCODONE    IR 5 milliGRAM(s) Oral every 4 hours PRN Moderate Pain (4 - 6)  oxyCODONE    IR 10 milliGRAM(s) Oral every 4 hours PRN Severe Pain (7 - 10)    HEPARIN:  [x] YES [] NO  Dose: 5000 UNITS Q8H  LOVENOX:[] YES [x] NO  Dose: XX mg Q24H  COUMADIN: []  YES [x] NO  Dose: XX mg  Q24H  SCD's: YES b/l  GI Prophylaxis: Protonix [], Pepcid [x], None [], (Contra-indication:.....)    Post-Op Beta-Blockers: Yes [x], No[], If No, then contraindication:  Post-Op Aspirin: Yes [x],  No [], If No, then contraindication:  Post-Op Statin: Yes [x], No[], If No, then contraindication:  Allergies    No Known Allergies    Intolerances      Ambulation/Activity Status: ambulates out of bed to chair     Assessment/Plan:  64y Male status-post CABGx3 POD#2  - Case and plan discussed with CTU Intensivist and CT Surgeon - Dr. Burden   - Continue CTU supportive care    - Continue DVT/GI prophylaxis  - Incentive Spirometry 10 times an hour  - Continue to advance physical activity as tolerated and continue PT/OT as directed  1. CAD: Continue ASA, statin, BB  2. HTN: cont BB  3. A. Fib: mag sulfate 1gm  4. COPD/Hypoxia: continue duoneb, mucinex, wean o2 as tolerated  5. DM/Glucose Control: D/C insulin drip, start insulin sliding scale  6. D/C a-line  7. Pain management: Toradol    Social Service Disposition: OPERATIVE PROCEDURE(s):    CABGx3            POD #  2                     64yMale  SURGEON(s): Dr. Burden  SUBJECTIVE ASSESSMENT: Pt seen and examined at bedside. Has no complaints at this time.  Vital Signs Last 24 Hrs  T(F): 99.6 (2019 06:00), Max: 99.8 (2019 19:00)  HR: 80 (2019 07:00) (72 - 84)  BP: 111/66 (2019 16:00) (87/53 - 111/66)  BP(mean): 83 (2019 16:00) (65 - 83)  ABP: 129/58 (2019 07:00) (-1/-8 - 149/67)  ABP(mean): 80 (2019 07:00)  RR: 29 (2019 07:00) (14 - 35)  SpO2: 92% (2019 07:00) (91% - 99%)  CVP(mm Hg): 11 (2019 09:30)  CO: 4.9 (2019 08:00)  CI: 2.4 (2019 08:00)  PA: 30/15 (2019 09:30)  SVR: 962 (2019 08:00)    I&O's Detail    2019 07:  -  2019 07:00  --------------------------------------------------------  IN:    Albumin 5%  - 500 mL: 1500 mL    insulin regular Infusion: 44 mL    IV PiggyBack: 250 mL    nitroglycerin  Infusion: 4 mL    Oral Fluid: 270 mL    sodium chloride 0.9%.: 480 mL    vasopressin Infusion: 21.6 mL  Total IN: 2569.6 mL    OUT:    Chest Tube: 75 mL    Chest Tube: 35 mL    Indwelling Catheter - Urethral: 764 mL  Total OUT: 874 mL        Net:   I&O's Detail    2019 07:01  -  2019 07:00  --------------------------------------------------------  Total NET: 1148.7 mL      2019 07:01  -  2019 07:00  --------------------------------------------------------  Total NET: 1695.6 mL        CAPILLARY BLOOD GLUCOSE  154 (2019 08:00)      POCT Blood Glucose.: 146 mg/dL (2019 06:31)  POCT Blood Glucose.: 105 mg/dL (2019 02:13)  POCT Blood Glucose.: 127 mg/dL (2019 23:54)  POCT Blood Glucose.: 118 mg/dL (2019 21:22)  POCT Blood Glucose.: 115 mg/dL (2019 16:31)  POCT Blood Glucose.: 134 mg/dL (2019 09:33)  POCT Blood Glucose.: 154 mg/dL (2019 08:11)    Physical Exam:  General: NAD; A&Ox3  Cardiac: S1/S2, RRR, no murmur, no rubs  Lungs: decreased bs at bases  Abdomen: Soft/NT/ND; positive bowel sounds x 4  Sternum: Intact, no click, incision healing well with no drainage  Incisions: Incisions clean/dry/intact  Extremities: No edema b/l lower extremities; good capillary refill; no cyanosis; palpable 1+ pedal pulses b/l    Central Venous Catheter: Yes[x]  No[] , If Yes indication: hemodynamically unstable         Day # 2  Tse Catheter: Yes  [x] , No  [] , If yes indication: Monitor strict in/out                      Day # 2  NGT: Yes [] No [x] ,    If Yes Placement:                                     Day #  EPICARDIAL WIRES:  [x] YES [] NO                                              Day # 2  BOWEL MOVEMENT:  [] YES [x] NO, If No, Timing since last BM:      Day #  CHEST TUBE(Left/Right):  [] YES [x] NO, If yes -  AIR LEAKS:  [] YES [] NO        LABS:                        9.1<L>  10.88<H> )-----------( 98<L>    ( 2019 01:00 )             27.7<L>                        9.9<L>  11.75<H> )-----------( 123<L>    ( 2019 02:00 )             29.3<L>        137  |  106  |  16  ----------------------------<  124<H>  4.1   |  22  |  0.8      142  |  111<H>  |  14  ----------------------------<  144<H>  4.1   |  21  |  1.0    Ca    8.2<L>      2019 01:00  Mg     2.5         TPro  5.9<L> [6.0 - 8.0]  /  Alb  4.4 [3.5 - 5.2]  /  TBili  1.7<H> [0.2 - 1.2]  /  DBili  x   /  AST  39 [0 - 41]  /  ALT  29 [0 - 41]  /  AlkPhos  76 [30 - 115]      PT/INR - ( 2019 01:00 )   PT: ;   INR: 1.46 ratio         PT/INR - ( 2019 02:00 )   PT: ;   INR: 1.49 ratio         PTT - ( 2019 01:00 )  PTT:42.6 sec, PTT - ( 2019 02:00 )  PTT:31.3 sec  Urinalysis Basic - ( 2019 06:36 )    Color: Yellow / Appearance: Clear / S.019 / pH: x  Gluc: x / Ketone: Trace  / Bili: Negative / Urobili: <2 mg/dL   Blood: x / Protein: Negative / Nitrite: Negative   Leuk Esterase: Negative / RBC: x / WBC x   Sq Epi: x / Non Sq Epi: x / Bacteria: x      ABG - ( 2019 05:22 )  pH: 7.38  /  pCO2: 38    /  pO2: 73    / HCO3: 22    / Base Excess: -2.4  /  SaO2: 94    /  LA: 0.9              RADIOLOGY & ADDITIONAL TESTS:  CXR: < from: Xray Chest 1 View- PORTABLE-Routine (19 @ 04:23) >  EXAM:  XR CHEST PORTABLE ROUTINE 1V            PROCEDURE DATE:  2019      < end of copied text >  < from: Xray Chest 1 View- PORTABLE-Routine (19 @ 04:23) >  Impression:      Stable small left basilar opacity.      < end of copied text >    MEDICATIONS  (STANDING):  albumin human  5% IVPB 3000 milliLiter(s) IV Intermittent once  ALBUTerol/ipratropium for Nebulization 3 milliLiter(s) Nebulizer every 6 hours  aspirin enteric coated 325 milliGRAM(s) Oral daily  aspirin Suppository 300 milliGRAM(s) Rectal once  atorvastatin 40 milliGRAM(s) Oral at bedtime  chlorhexidine 4% Liquid 1 Application(s) Topical <User Schedule>  dextrose 50% Injectable 50 milliLiter(s) IV Push every 15 minutes  docusate sodium 100 milliGRAM(s) Oral three times a day  famotidine    Tablet 20 milliGRAM(s) Oral daily  guaiFENesin  milliGRAM(s) Oral every 12 hours  heparin  Injectable 5000 Unit(s) SubCutaneous every 8 hours  insulin regular Infusion 1 Unit(s)/Hr (1 mL/Hr) IV Continuous <Continuous>  magnesium sulfate  IVPB 1 Gram(s) IV Intermittent every 12 hours  meperidine     Injectable 25 milliGRAM(s) IV Push once  metoprolol tartrate 12.5 milliGRAM(s) Oral every 12 hours  nitroglycerin  Infusion 5 MICROgram(s)/Min (1.5 mL/Hr) IV Continuous <Continuous>  senna 2 Tablet(s) Oral at bedtime  sodium chloride 0.9%. 1000 milliLiter(s) (20 mL/Hr) IV Continuous <Continuous>  vasopressin Infusion 0.04 Unit(s)/Min (2.4 mL/Hr) IV Continuous <Continuous>    MEDICATIONS  (PRN):  bisacodyl 5 milliGRAM(s) Oral every 12 hours PRN Constipation  oxyCODONE    IR 5 milliGRAM(s) Oral every 4 hours PRN Moderate Pain (4 - 6)  oxyCODONE    IR 10 milliGRAM(s) Oral every 4 hours PRN Severe Pain (7 - 10)    HEPARIN:  [x] YES [] NO  Dose: 5000 UNITS Q8H  LOVENOX:[] YES [x] NO  Dose: XX mg Q24H  COUMADIN: []  YES [x] NO  Dose: XX mg  Q24H  SCD's: YES b/l  GI Prophylaxis: Protonix [], Pepcid [x], None [], (Contra-indication:.....)    Post-Op Beta-Blockers: Yes [x], No[], If No, then contraindication:  Post-Op Aspirin: Yes [x],  No [], If No, then contraindication:  Post-Op Statin: Yes [x], No[], If No, then contraindication:  Allergies    No Known Allergies    Intolerances      Ambulation/Activity Status: ambulates out of bed to chair     Assessment/Plan:  64y Male status-post CABGx3 POD#2  - Case and plan discussed with CTU Intensivist and CT Surgeon - Dr. Burden   - Continue CTU supportive care    - Continue DVT/GI prophylaxis  - Incentive Spirometry 10 times an hour  - Continue to advance physical activity as tolerated and continue PT/OT as directed  1. CAD: Continue ASA, statin, BB  2. HTN: cont BB  3. A. Fib: mag sulfate 1gm  4. COPD/Hypoxia: continue duoneb, mucinex, wean o2 as tolerated  5. DM/Glucose Control: D/C insulin drip, start lantus 20 and humalog 5 with sliding scale  6. D/C a-line  7. Pain management: Toradol    Social Service Disposition:  home

## 2019-07-25 NOTE — PROGRESS NOTE ADULT - SUBJECTIVE AND OBJECTIVE BOX
CTU Attending Progress Daily Note     25 Jul 2019 07:55  Admited 07-22-19, Hospital Day 3d  POD# -     HPI:  64 male with HTN presenting for elective LHC. Positive stress test, as per patient's daughter he could not finish a treadmill stress test. (22 Jul 2019 07:45)    Home Medications:  atorvastatin 20 mg oral tablet: 1 tab(s) orally once a day (22 Jul 2019 07:25)  metoprolol succinate 50 mg oral tablet, extended release: 1 tab(s) orally once a day (22 Jul 2019 07:25)  Plavix 75 mg oral tablet: 1 tab(s) orally once a day (22 Jul 2019 07:25)    FAMILY HISTORY:  No pertinent family history in first degree relatives    PAST MEDICAL & SURGICAL HISTORY:  Vertigo    Interval event for past 24 hr:  JAVIER CORONA  64y had no event.   Current Complains:  JAVIER CORONA has no new complains  Allergies    No Known Allergies    Intolerances      OBJECTIVE:  Vitals last 24 hrs  T(C): 37.6 (07-25-19 @ 06:00), Max: 37.7 (07-24-19 @ 19:00)  T(F): 99.6 (07-25-19 @ 06:00), Max: 99.8 (07-24-19 @ 19:00)  HR: 80 (07-25-19 @ 07:00) (72 - 84)  BP: 111/66 (07-24-19 @ 16:00) (87/53 - 111/66)  ABP: 129/58 (07-25-19 @ 07:00) (-1/-8 - 149/67)  ABP(mean): 80 (07-25-19 @ 07:00) (-1 - 95)  RR: 29 (07-25-19 @ 07:00) (14 - 35)  SpO2: 92% (07-25-19 @ 07:00) (91% - 99%)  CVP(mm Hg): 11 (07-24-19 @ 09:30) (10 - 11)  CI: 2.4 (07-24-19 @ 08:00) (2.4 - 2.4)  PA: 30/15 (07-24-19 @ 09:30) (26/14 - 31/15)  PA(direct): --  PCWP: --  SVR: 962 (07-24-19 @ 08:00) (962 - 962)  PVR: --    07-24-19 @ 07:01  -  07-25-19 @ 07:00  --------------------------------------------------------  IN:    Albumin 5%  - 500 mL: 1500 mL    insulin regular Infusion: 44 mL    IV PiggyBack: 250 mL    nitroglycerin  Infusion: 4 mL    Oral Fluid: 270 mL    sodium chloride 0.9%.: 480 mL    vasopressin Infusion: 21.6 mL  Total IN: 2569.6 mL    OUT:    Chest Tube: 75 mL    Chest Tube: 35 mL    Indwelling Catheter - Urethral: 764 mL  Total OUT: 874 mL    Total NET: 1695.6 mL          CAPILLARY BLOOD GLUCOSE  154 (24 Jul 2019 08:00)      POCT Blood Glucose.: 146 mg/dL (25 Jul 2019 06:31)  POCT Blood Glucose.: 105 mg/dL (25 Jul 2019 02:13)  POCT Blood Glucose.: 127 mg/dL (24 Jul 2019 23:54)  POCT Blood Glucose.: 118 mg/dL (24 Jul 2019 21:22)  POCT Blood Glucose.: 115 mg/dL (24 Jul 2019 16:31)  POCT Blood Glucose.: 134 mg/dL (24 Jul 2019 09:33)  POCT Blood Glucose.: 154 mg/dL (24 Jul 2019 08:11)    LABS:  ABG - ( 25 Jul 2019 05:22 )  pH, Arterial: 7.38  pH, Blood: x     /  pCO2: 38    /  pO2: 73    / HCO3: 22    / Base Excess: -2.4  /  SaO2: 94              Blood Gas Arterial, Lactate: 0.9 mmoL/L (07-25-19 @ 05:22)                          9.1    10.88 )-----------( 98       ( 25 Jul 2019 01:00 )             27.7     Hemoglobin: 9.1 g/dL (07-25 @ 01:00)  Hemoglobin: 9.9 g/dL (07-24 @ 02:00)  Hemoglobin: 11.4 g/dL (07-23 @ 17:22)  Hemoglobin: 11.5 g/dL (07-23 @ 15:22)  Hemoglobin: 15.7 g/dL (07-22 @ 12:03)    07-25    137  |  106  |  16  ----------------------------<  124<H>  4.1   |  22  |  0.8    Ca    8.2<L>      25 Jul 2019 01:00  Mg     2.5     07-25    TPro  5.9<L>  /  Alb  4.4  /  TBili  1.7<H>  /  DBili  x   /  AST  39  /  ALT  29  /  AlkPhos  76  07-25    Creatinine, Serum: 0.8 mg/dL (07-25 @ 01:00)  Creatinine, Serum: 1.0 mg/dL (07-24 @ 02:00)  Creatinine, Serum: 1.0 mg/dL (07-23 @ 17:22)  Creatinine, Serum: 1.0 mg/dL (07-23 @ 04:20)  Creatinine, Serum: 1.0 mg/dL (07-22 @ 12:03)    PT/INR - ( 25 Jul 2019 01:00 )   PT: 16.70 sec;   INR: 1.46 ratio         PTT - ( 25 Jul 2019 01:00 )  PTT:42.6 sec      HOSPITAL MEDICATIONS:  MEDICATIONS  (STANDING):  albumin human  5% IVPB 3000 milliLiter(s) IV Intermittent once  ALBUTerol/ipratropium for Nebulization 3 milliLiter(s) Nebulizer every 6 hours  aspirin enteric coated 325 milliGRAM(s) Oral daily  aspirin Suppository 300 milliGRAM(s) Rectal once  atorvastatin 40 milliGRAM(s) Oral at bedtime  chlorhexidine 4% Liquid 1 Application(s) Topical <User Schedule>  dextrose 50% Injectable 50 milliLiter(s) IV Push every 15 minutes  docusate sodium 100 milliGRAM(s) Oral three times a day  famotidine    Tablet 20 milliGRAM(s) Oral daily  guaiFENesin  milliGRAM(s) Oral every 12 hours  heparin  Injectable 5000 Unit(s) SubCutaneous every 8 hours  insulin regular Infusion 1 Unit(s)/Hr (1 mL/Hr) IV Continuous <Continuous>  magnesium sulfate  IVPB 1 Gram(s) IV Intermittent every 12 hours  meperidine     Injectable 25 milliGRAM(s) IV Push once  metoprolol tartrate 12.5 milliGRAM(s) Oral every 12 hours  nitroglycerin  Infusion 5 MICROgram(s)/Min (1.5 mL/Hr) IV Continuous <Continuous>  senna 2 Tablet(s) Oral at bedtime  sodium chloride 0.9%. 1000 milliLiter(s) (20 mL/Hr) IV Continuous <Continuous>  vasopressin Infusion 0.04 Unit(s)/Min (2.4 mL/Hr) IV Continuous <Continuous>    MEDICATIONS  (PRN):  bisacodyl 5 milliGRAM(s) Oral every 12 hours PRN Constipation  oxyCODONE    IR 5 milliGRAM(s) Oral every 4 hours PRN Moderate Pain (4 - 6)  oxyCODONE    IR 10 milliGRAM(s) Oral every 4 hours PRN Severe Pain (7 - 10)      REVIEW OF SYSTEMS:  CONSTITUTIONAL: [X] all negative; [ ] weakness, [ ] fevers, [ ] chills  EYES/ENT: [X] all negative; [ ] visual changes, [ ] vertigo, [ ] throat pain, [ ] eye pain  NECK: [X] all negative; [ ] pain, [ ] stiffness  RESPIRATORY: [ ] all negative, [ ] cough, [ ] wheezing, [ ] hemoptysis, [ ] shortness of breath  CARDIOVASCULAR: [ ] all negative; [ ] chest pain, [ ] palpitations, [ ] orthopnea  GASTROINTESTINAL: [X] all negative; [ ]abdominal pain, [ ] nausea, [ ] vomiting, [ ] hematemesis, [ ] diarrhea, [ ] constipation, [ ] melena, [ ] hematochezia.  GENITOURINARY: [X] all negative; [ ] dysuria, [ ] frequency, [ ] hematuria  NEUROLOGICAL: [X] all negative; [ ] numbness, [ ] weakness, [ ] paresthesias  MUSCULOSKELETAL: [X] all negative, [ ] joint pain, [ ] joint swelling, [ ] joint redness, [ ] bone pain  SKIN: [X] all negative; [ ] itching, [ ] burning, [ ] rashes, [ ] lesions   All other review of systems is negative unless indicated above.    [  ] Unable to assess ROS because     PHYSICAL EXAM:          CONSTITUTIONAL: Well-developed; well-nourished; in no acute distress.   	SKIN: warm, dry, no rashes or lesions  	HEENT: Atraumatic. Normocephalic. PERRL. Moist membranes, no conj injection, sclera clear  	NECK: Supple; non tender.  No JVD. No lymphadenopathy.  	CARD: Normal S1, S2. Rate and Rhythm are regular. No murmurs.  	RESP: Good air entry bilaterally, no wheezes, no rales no rhonchi.  	ABD: Soft, not tender, not distended, no CVA ttp no rebound no guarding, bowel sounds present  	EXT: Normal ROM.  No clubbing, no cyanosis, no pedal edema, no calf pain b/l, Peripheral pulses intact.  	LYMPH: No acute cervical adenopathy.  	NEURO: Alert, awake, motor 5/5 R, 5/5 L, sensation intact bilat, CN 2-12 intact,          PSYCH: Cooperative, appropriate. Alert & oriented x 3    RADIOLOGY:  X Reviewed and interpreted by me  CxR from 07-25-19 shows mild congestion, no pneumothorax, no effusion, no cardiomegaly,   ETT above german in good position, NGT in place, TLC in place, S-G Catheter in place, Chest Tubes in place,     ECG:  X Reviewed and interpreted by me CTU Attending Progress Daily Note     25 Jul 2019 07:55  Admited 07-22-19, Hospital Day 3d  POD# - 2    HPI:  64 male with HTN presenting for elective LHC. Positive stress test, as per patient's daughter he could not finish a treadmill stress test. (22 Jul 2019 07:45)    Home Medications:  atorvastatin 20 mg oral tablet: 1 tab(s) orally once a day (22 Jul 2019 07:25)  metoprolol succinate 50 mg oral tablet, extended release: 1 tab(s) orally once a day (22 Jul 2019 07:25)  Plavix 75 mg oral tablet: 1 tab(s) orally once a day (22 Jul 2019 07:25)    FAMILY HISTORY:  No pertinent family history in first degree relatives    PAST MEDICAL & SURGICAL HISTORY:  Vertigo    Interval event for past 24 hr:  JAVIER CORONA  64y had no event.   Current Complains:  JAVIER CORONA has no new complains  Allergies    No Known Allergies    Intolerances      OBJECTIVE:  Vitals last 24 hrs  T(C): 37.6 (07-25-19 @ 06:00), Max: 37.7 (07-24-19 @ 19:00)  T(F): 99.6 (07-25-19 @ 06:00), Max: 99.8 (07-24-19 @ 19:00)  HR: 80 (07-25-19 @ 07:00) (72 - 84)  BP: 111/66 (07-24-19 @ 16:00) (87/53 - 111/66)  ABP: 129/58 (07-25-19 @ 07:00) (-1/-8 - 149/67)  ABP(mean): 80 (07-25-19 @ 07:00) (-1 - 95)  RR: 29 (07-25-19 @ 07:00) (14 - 35)  SpO2: 92% (07-25-19 @ 07:00) (91% - 99%)  CVP(mm Hg): 11 (07-24-19 @ 09:30) (10 - 11)  CI: 2.4 (07-24-19 @ 08:00) (2.4 - 2.4)  PA: 30/15 (07-24-19 @ 09:30) (26/14 - 31/15)  PA(direct): --  PCWP: --  SVR: 962 (07-24-19 @ 08:00) (962 - 962)  PVR: --    07-24-19 @ 07:01  -  07-25-19 @ 07:00  --------------------------------------------------------  IN:    Albumin 5%  - 500 mL: 1500 mL    insulin regular Infusion: 44 mL    IV PiggyBack: 250 mL    nitroglycerin  Infusion: 4 mL    Oral Fluid: 270 mL    sodium chloride 0.9%.: 480 mL    vasopressin Infusion: 21.6 mL  Total IN: 2569.6 mL    OUT:    Chest Tube: 75 mL    Chest Tube: 35 mL    Indwelling Catheter - Urethral: 764 mL  Total OUT: 874 mL    Total NET: 1695.6 mL          CAPILLARY BLOOD GLUCOSE  154 (24 Jul 2019 08:00)      POCT Blood Glucose.: 146 mg/dL (25 Jul 2019 06:31)  POCT Blood Glucose.: 105 mg/dL (25 Jul 2019 02:13)  POCT Blood Glucose.: 127 mg/dL (24 Jul 2019 23:54)  POCT Blood Glucose.: 118 mg/dL (24 Jul 2019 21:22)  POCT Blood Glucose.: 115 mg/dL (24 Jul 2019 16:31)  POCT Blood Glucose.: 134 mg/dL (24 Jul 2019 09:33)  POCT Blood Glucose.: 154 mg/dL (24 Jul 2019 08:11)    LABS:  ABG - ( 25 Jul 2019 05:22 )  pH, Arterial: 7.38  pH, Blood: x     /  pCO2: 38    /  pO2: 73    / HCO3: 22    / Base Excess: -2.4  /  SaO2: 94              Blood Gas Arterial, Lactate: 0.9 mmoL/L (07-25-19 @ 05:22)                          9.1    10.88 )-----------( 98       ( 25 Jul 2019 01:00 )             27.7     Hemoglobin: 9.1 g/dL (07-25 @ 01:00)  Hemoglobin: 9.9 g/dL (07-24 @ 02:00)  Hemoglobin: 11.4 g/dL (07-23 @ 17:22)  Hemoglobin: 11.5 g/dL (07-23 @ 15:22)  Hemoglobin: 15.7 g/dL (07-22 @ 12:03)    07-25    137  |  106  |  16  ----------------------------<  124<H>  4.1   |  22  |  0.8    Ca    8.2<L>      25 Jul 2019 01:00  Mg     2.5     07-25    TPro  5.9<L>  /  Alb  4.4  /  TBili  1.7<H>  /  DBili  x   /  AST  39  /  ALT  29  /  AlkPhos  76  07-25    Creatinine, Serum: 0.8 mg/dL (07-25 @ 01:00)  Creatinine, Serum: 1.0 mg/dL (07-24 @ 02:00)  Creatinine, Serum: 1.0 mg/dL (07-23 @ 17:22)  Creatinine, Serum: 1.0 mg/dL (07-23 @ 04:20)  Creatinine, Serum: 1.0 mg/dL (07-22 @ 12:03)    PT/INR - ( 25 Jul 2019 01:00 )   PT: 16.70 sec;   INR: 1.46 ratio     PTT - ( 25 Jul 2019 01:00 )  PTT:42.6 sec      HOSPITAL MEDICATIONS:  MEDICATIONS  (STANDING):  albumin human  5% IVPB 3000 milliLiter(s) IV Intermittent once  ALBUTerol/ipratropium for Nebulization 3 milliLiter(s) Nebulizer every 6 hours  aspirin enteric coated 325 milliGRAM(s) Oral daily  aspirin Suppository 300 milliGRAM(s) Rectal once  atorvastatin 40 milliGRAM(s) Oral at bedtime  chlorhexidine 4% Liquid 1 Application(s) Topical <User Schedule>  dextrose 50% Injectable 50 milliLiter(s) IV Push every 15 minutes  docusate sodium 100 milliGRAM(s) Oral three times a day  famotidine    Tablet 20 milliGRAM(s) Oral daily  guaiFENesin  milliGRAM(s) Oral every 12 hours  heparin  Injectable 5000 Unit(s) SubCutaneous every 8 hours  insulin regular Infusion 1 Unit(s)/Hr (1 mL/Hr) IV Continuous <Continuous>  magnesium sulfate  IVPB 1 Gram(s) IV Intermittent every 12 hours  meperidine     Injectable 25 milliGRAM(s) IV Push once  metoprolol tartrate 12.5 milliGRAM(s) Oral every 12 hours  nitroglycerin  Infusion 5 MICROgram(s)/Min (1.5 mL/Hr) IV Continuous <Continuous>  senna 2 Tablet(s) Oral at bedtime  sodium chloride 0.9%. 1000 milliLiter(s) (20 mL/Hr) IV Continuous <Continuous>  vasopressin Infusion 0.04 Unit(s)/Min (2.4 mL/Hr) IV Continuous <Continuous>    MEDICATIONS  (PRN):  bisacodyl 5 milliGRAM(s) Oral every 12 hours PRN Constipation  oxyCODONE    IR 5 milliGRAM(s) Oral every 4 hours PRN Moderate Pain (4 - 6)  oxyCODONE    IR 10 milliGRAM(s) Oral every 4 hours PRN Severe Pain (7 - 10)      REVIEW OF SYSTEMS:  CONSTITUTIONAL: [X] all negative; [ ] weakness, [ ] fevers, [ ] chills  EYES/ENT: [X] all negative; [ ] visual changes, [ ] vertigo, [ ] throat pain, [ ] eye pain  NECK: [X] all negative; [ ] pain, [ ] stiffness  RESPIRATORY: [ ] all negative, [x ] cough, [ ] wheezing, [ ] hemoptysis, [x ] shortness of breath  CARDIOVASCULAR: [ ] all negative; [x ] chest pain, [ ] palpitations, [ ] orthopnea  GASTROINTESTINAL: [X] all negative; [ ]abdominal pain, [ ] nausea, [ ] vomiting, [ ] hematemesis, [ ] diarrhea, [ ] constipation, [ ] melena, [ ] hematochezia.  GENITOURINARY: [X] all negative; [ ] dysuria, [ ] frequency, [ ] hematuria  NEUROLOGICAL: [X] all negative; [ ] numbness, [ ] weakness, [ ] paresthesias  MUSCULOSKELETAL: [X] all negative, [ ] joint pain, [ ] joint swelling, [ ] joint redness, [ ] bone pain  SKIN: [X] all negative; [ ] itching, [ ] burning, [ ] rashes, [ ] lesions   All other review of systems is negative unless indicated above.    [  ] Unable to assess ROS because     PHYSICAL EXAM:          CONSTITUTIONAL: Well-developed; well-nourished; in no acute distress.   	SKIN: warm, dry, no rashes or lesions  	HEENT: Atraumatic. Normocephalic. PERRL. Moist membranes, no conj injection, sclera clear  	NECK: Supple; non tender.  No JVD. No lymphadenopathy.  	CARD: Normal S1, S2. Rate and Rhythm are regular. No murmurs.  	RESP: Good air entry bilaterally, no wheezes, no rales no rhonchi.  	ABD: Soft, not tender, not distended, no CVA ttp no rebound no guarding, bowel sounds present  	EXT: Normal ROM.  No clubbing, no cyanosis, + pedal edema, no calf pain b/l, Peripheral pulses intact.  	LYMPH: No acute cervical adenopathy.  	NEURO: Alert, awake, motor 5/5 R, 5/5 L, sensation intact bilat, CN 2-12 intact,          PSYCH: Cooperative, appropriate. Alert & oriented x 3    RADIOLOGY:  X Reviewed and interpreted by me  CxR from 07-25-19 shows mild congestion, no pneumothorax, no effusion, no cardiomegaly,       ECG:  X Reviewed and interpreted by me - NSR - QTc - 453

## 2019-07-25 NOTE — PROGRESS NOTE ADULT - SUBJECTIVE AND OBJECTIVE BOX
SUBJECTIVE ASSESSMENT:  pt reports incisional pain and difficulty taking deep breaths    Vital Signs Last 24 Hrs  T(C): 37.1 (25 Jul 2019 12:00), Max: 37.7 (24 Jul 2019 19:00)  T(F): 98.7 (25 Jul 2019 12:00), Max: 99.9 (25 Jul 2019 08:00)  HR: 80 (25 Jul 2019 13:00) (73 - 84)  BP: 110/59 (25 Jul 2019 13:00) (100/58 - 134/70)  BP(mean): 80 (25 Jul 2019 13:00) (74 - 95)  RR: 24 (25 Jul 2019 13:00) (16 - 38)  SpO2: 94% (25 Jul 2019 13:00) (91% - 98%)  07-24-19 @ 07:01  -  07-25-19 @ 07:00  --------------------------------------------------------  IN: 2569.6 mL / OUT: 874 mL / NET: 1695.6 mL    07-25-19 @ 07:01  -  07-25-19 @ 14:09  --------------------------------------------------------  IN: 260 mL / OUT: 672 mL / NET: -412 mL    A&OX3 in NAD  CTA bilat  sternum stable, no drainage  nl s1, s2  abd soft/NT/ND  no peripheral edema  SVG harvest site is healing well  radial artery harvest site is healing well    LABS:                        9.1    10.88 )-----------( 98       ( 25 Jul 2019 01:00 )             27.7     PT/INR - ( 25 Jul 2019 01:00 )   PT: 16.70 sec;   INR: 1.46 ratio      PTT - ( 25 Jul 2019 01:00 )  PTT:42.6 sec  07-25    137  |  106  |  16  ----------------------------<  124<H>  4.1   |  22  |  0.8    Ca    8.2<L>      25 Jul 2019 01:00  Mg     2.5     07-25    TPro  5.9<L>  /  Alb  4.4  /  TBili  1.7<H>  /  DBili  x   /  AST  39  /  ALT  29  /  AlkPhos  76  07-25    MEDICATIONS  (STANDING):  albumin human  5% IVPB 3000 milliLiter(s) IV Intermittent once  ALBUTerol/ipratropium for Nebulization 3 milliLiter(s) Nebulizer every 6 hours  aspirin enteric coated 325 milliGRAM(s) Oral daily  atorvastatin 40 milliGRAM(s) Oral at bedtime  docusate sodium 100 milliGRAM(s) Oral three times a day  famotidine    Tablet 20 milliGRAM(s) Oral daily  guaiFENesin  milliGRAM(s) Oral every 12 hours  heparin  Injectable 5000 Unit(s) SubCutaneous every 8 hours  insulin glargine Injectable (LANTUS) 20 Unit(s) SubCutaneous every morning  insulin lispro (HumaLOG) corrective regimen sliding scale   SubCutaneous three times a day before meals  insulin lispro Injectable (HumaLOG) 5 Unit(s) SubCutaneous three times a day before meals  isosorbide   dinitrate Tablet (ISORDIL) 5 milliGRAM(s) Oral three times a day  magnesium sulfate  IVPB 1 Gram(s) IV Intermittent every 12 hours  meperidine     Injectable 25 milliGRAM(s) IV Push once  metoprolol tartrate 12.5 milliGRAM(s) Oral every 12 hours  senna 2 Tablet(s) Oral at bedtime  simethicone 80 milliGRAM(s) Chew two times a day    MEDICATIONS  (PRN):  bisacodyl 5 milliGRAM(s) Oral every 12 hours PRN Constipation  dextrose 40% Gel 15 Gram(s) Oral once PRN Blood Glucose LESS THAN 70 milliGRAM(s)/deciliter  glucagon  Injectable 1 milliGRAM(s) IntraMuscular once PRN Glucose LESS THAN 70 milligrams/deciliter  ketorolac   Injectable 30 milliGRAM(s) IV Push every 6 hours PRN Moderate Pain (4 - 6)  oxyCODONE    IR 5 milliGRAM(s) Oral every 4 hours PRN Moderate Pain (4 - 6)  oxyCODONE    IR 10 milliGRAM(s) Oral every 4 hours PRN Severe Pain (7 - 10)

## 2019-07-26 ENCOUNTER — TRANSCRIPTION ENCOUNTER (OUTPATIENT)
Age: 64
End: 2019-07-26

## 2019-07-26 LAB
ANION GAP SERPL CALC-SCNC: 10 MMOL/L — SIGNIFICANT CHANGE UP (ref 7–14)
BASOPHILS # BLD AUTO: 0.02 K/UL — SIGNIFICANT CHANGE UP (ref 0–0.2)
BASOPHILS NFR BLD AUTO: 0.2 % — SIGNIFICANT CHANGE UP (ref 0–1)
BUN SERPL-MCNC: 16 MG/DL — SIGNIFICANT CHANGE UP (ref 10–20)
CALCIUM SERPL-MCNC: 8.2 MG/DL — LOW (ref 8.5–10.1)
CHLORIDE SERPL-SCNC: 103 MMOL/L — SIGNIFICANT CHANGE UP (ref 98–110)
CO2 SERPL-SCNC: 24 MMOL/L — SIGNIFICANT CHANGE UP (ref 17–32)
CREAT SERPL-MCNC: 0.8 MG/DL — SIGNIFICANT CHANGE UP (ref 0.7–1.5)
EOSINOPHIL # BLD AUTO: 0.05 K/UL — SIGNIFICANT CHANGE UP (ref 0–0.7)
EOSINOPHIL NFR BLD AUTO: 0.5 % — SIGNIFICANT CHANGE UP (ref 0–8)
GLUCOSE BLDC GLUCOMTR-MCNC: 100 MG/DL — HIGH (ref 70–99)
GLUCOSE BLDC GLUCOMTR-MCNC: 109 MG/DL — HIGH (ref 70–99)
GLUCOSE BLDC GLUCOMTR-MCNC: 122 MG/DL — HIGH (ref 70–99)
GLUCOSE BLDC GLUCOMTR-MCNC: 125 MG/DL — HIGH (ref 70–99)
GLUCOSE SERPL-MCNC: 109 MG/DL — HIGH (ref 70–99)
HCT VFR BLD CALC: 26.6 % — LOW (ref 42–52)
HGB BLD-MCNC: 8.9 G/DL — LOW (ref 14–18)
IMM GRANULOCYTES NFR BLD AUTO: 0.5 % — HIGH (ref 0.1–0.3)
LYMPHOCYTES # BLD AUTO: 1.78 K/UL — SIGNIFICANT CHANGE UP (ref 1.2–3.4)
LYMPHOCYTES # BLD AUTO: 16.1 % — LOW (ref 20.5–51.1)
MAGNESIUM SERPL-MCNC: 2.3 MG/DL — SIGNIFICANT CHANGE UP (ref 1.8–2.4)
MCHC RBC-ENTMCNC: 30.6 PG — SIGNIFICANT CHANGE UP (ref 27–31)
MCHC RBC-ENTMCNC: 33.5 G/DL — SIGNIFICANT CHANGE UP (ref 32–37)
MCV RBC AUTO: 91.4 FL — SIGNIFICANT CHANGE UP (ref 80–94)
MONOCYTES # BLD AUTO: 1.1 K/UL — HIGH (ref 0.1–0.6)
MONOCYTES NFR BLD AUTO: 10 % — HIGH (ref 1.7–9.3)
NEUTROPHILS # BLD AUTO: 8.04 K/UL — HIGH (ref 1.4–6.5)
NEUTROPHILS NFR BLD AUTO: 72.7 % — SIGNIFICANT CHANGE UP (ref 42.2–75.2)
NRBC # BLD: 0 /100 WBCS — SIGNIFICANT CHANGE UP (ref 0–0)
PLATELET # BLD AUTO: 116 K/UL — LOW (ref 130–400)
POTASSIUM SERPL-MCNC: 3.6 MMOL/L — SIGNIFICANT CHANGE UP (ref 3.5–5)
POTASSIUM SERPL-SCNC: 3.6 MMOL/L — SIGNIFICANT CHANGE UP (ref 3.5–5)
RBC # BLD: 2.91 M/UL — LOW (ref 4.7–6.1)
RBC # FLD: 11.9 % — SIGNIFICANT CHANGE UP (ref 11.5–14.5)
SODIUM SERPL-SCNC: 137 MMOL/L — SIGNIFICANT CHANGE UP (ref 135–146)
WBC # BLD: 11.04 K/UL — HIGH (ref 4.8–10.8)
WBC # FLD AUTO: 11.04 K/UL — HIGH (ref 4.8–10.8)

## 2019-07-26 PROCEDURE — 71045 X-RAY EXAM CHEST 1 VIEW: CPT | Mod: 26

## 2019-07-26 PROCEDURE — 99233 SBSQ HOSP IP/OBS HIGH 50: CPT

## 2019-07-26 RX ORDER — METOPROLOL TARTRATE 50 MG
12.5 TABLET ORAL ONCE
Refills: 0 | Status: COMPLETED | OUTPATIENT
Start: 2019-07-26 | End: 2019-07-26

## 2019-07-26 RX ORDER — METOPROLOL TARTRATE 50 MG
25 TABLET ORAL
Refills: 0 | Status: DISCONTINUED | OUTPATIENT
Start: 2019-07-26 | End: 2019-07-29

## 2019-07-26 RX ORDER — FUROSEMIDE 40 MG
40 TABLET ORAL ONCE
Refills: 0 | Status: COMPLETED | OUTPATIENT
Start: 2019-07-26 | End: 2019-07-26

## 2019-07-26 RX ORDER — POTASSIUM CHLORIDE 20 MEQ
20 PACKET (EA) ORAL ONCE
Refills: 0 | Status: COMPLETED | OUTPATIENT
Start: 2019-07-26 | End: 2019-07-26

## 2019-07-26 RX ORDER — POTASSIUM CHLORIDE 20 MEQ
40 PACKET (EA) ORAL ONCE
Refills: 0 | Status: COMPLETED | OUTPATIENT
Start: 2019-07-26 | End: 2019-07-26

## 2019-07-26 RX ADMIN — Medication 100 MILLIGRAM(S): at 22:26

## 2019-07-26 RX ADMIN — ATORVASTATIN CALCIUM 40 MILLIGRAM(S): 80 TABLET, FILM COATED ORAL at 22:26

## 2019-07-26 RX ADMIN — SIMETHICONE 80 MILLIGRAM(S): 80 TABLET, CHEWABLE ORAL at 05:30

## 2019-07-26 RX ADMIN — Medication 600 MILLIGRAM(S): at 17:56

## 2019-07-26 RX ADMIN — Medication 3 MILLILITER(S): at 14:28

## 2019-07-26 RX ADMIN — Medication 12.5 MILLIGRAM(S): at 13:00

## 2019-07-26 RX ADMIN — Medication 100 GRAM(S): at 18:13

## 2019-07-26 RX ADMIN — Medication 12.5 MILLIGRAM(S): at 05:30

## 2019-07-26 RX ADMIN — CHLORHEXIDINE GLUCONATE 1 APPLICATION(S): 213 SOLUTION TOPICAL at 05:31

## 2019-07-26 RX ADMIN — FAMOTIDINE 20 MILLIGRAM(S): 10 INJECTION INTRAVENOUS at 11:37

## 2019-07-26 RX ADMIN — Medication 3 MILLILITER(S): at 07:57

## 2019-07-26 RX ADMIN — ISOSORBIDE DINITRATE 5 MILLIGRAM(S): 5 TABLET ORAL at 13:03

## 2019-07-26 RX ADMIN — SIMETHICONE 80 MILLIGRAM(S): 80 TABLET, CHEWABLE ORAL at 17:55

## 2019-07-26 RX ADMIN — Medication 100 GRAM(S): at 05:31

## 2019-07-26 RX ADMIN — ISOSORBIDE DINITRATE 5 MILLIGRAM(S): 5 TABLET ORAL at 22:42

## 2019-07-26 RX ADMIN — Medication 25 MILLIGRAM(S): at 17:56

## 2019-07-26 RX ADMIN — OXYCODONE HYDROCHLORIDE 5 MILLIGRAM(S): 5 TABLET ORAL at 04:56

## 2019-07-26 RX ADMIN — Medication 600 MILLIGRAM(S): at 05:30

## 2019-07-26 RX ADMIN — HEPARIN SODIUM 5000 UNIT(S): 5000 INJECTION INTRAVENOUS; SUBCUTANEOUS at 05:30

## 2019-07-26 RX ADMIN — OXYCODONE HYDROCHLORIDE 5 MILLIGRAM(S): 5 TABLET ORAL at 06:22

## 2019-07-26 RX ADMIN — Medication 3 MILLILITER(S): at 19:58

## 2019-07-26 RX ADMIN — Medication 40 MILLIEQUIVALENT(S): at 05:29

## 2019-07-26 RX ADMIN — ISOSORBIDE DINITRATE 5 MILLIGRAM(S): 5 TABLET ORAL at 05:30

## 2019-07-26 RX ADMIN — Medication 10 MILLIGRAM(S): at 10:02

## 2019-07-26 RX ADMIN — HEPARIN SODIUM 5000 UNIT(S): 5000 INJECTION INTRAVENOUS; SUBCUTANEOUS at 22:26

## 2019-07-26 RX ADMIN — Medication 100 MILLIEQUIVALENT(S): at 05:31

## 2019-07-26 RX ADMIN — Medication 40 MILLIGRAM(S): at 05:30

## 2019-07-26 RX ADMIN — Medication 100 MILLIEQUIVALENT(S): at 06:35

## 2019-07-26 RX ADMIN — Medication 100 MILLIGRAM(S): at 13:03

## 2019-07-26 RX ADMIN — OXYCODONE HYDROCHLORIDE 5 MILLIGRAM(S): 5 TABLET ORAL at 01:15

## 2019-07-26 RX ADMIN — Medication 100 MILLIGRAM(S): at 05:30

## 2019-07-26 RX ADMIN — Medication 325 MILLIGRAM(S): at 11:37

## 2019-07-26 RX ADMIN — HEPARIN SODIUM 5000 UNIT(S): 5000 INJECTION INTRAVENOUS; SUBCUTANEOUS at 13:03

## 2019-07-26 NOTE — DISCHARGE NOTE NURSING/CASE MANAGEMENT/SOCIAL WORK - NSDCDPATPORTLINK_GEN_ALL_CORE
You can access the [facility] Patient Portal, offered by [facility], by registering with the following website: [email or web address]

## 2019-07-26 NOTE — PROGRESS NOTE ADULT - SUBJECTIVE AND OBJECTIVE BOX
SUBJECTIVE ASSESSMENT:  pt reports discomfort, incisional pain, trouble sleeping at night    Vital Signs Last 24 Hrs  T(C): 37.3 (26 Jul 2019 05:23), Max: 37.7 (25 Jul 2019 08:00)  T(F): 99.1 (26 Jul 2019 05:23), Max: 99.9 (25 Jul 2019 08:00)  HR: 82 (26 Jul 2019 06:00) (79 - 87)  BP: 142/71 (26 Jul 2019 06:00) (104/57 - 142/71)  BP(mean): 100 (26 Jul 2019 06:00) (74 - 100)  RR: 25 (26 Jul 2019 06:00) (17 - 38)  SpO2: 98% (26 Jul 2019 06:00) (92% - 99%)  07-25-19 @ 07:01  -  07-26-19 @ 07:00  --------------------------------------------------------  IN: 920 mL / OUT: 2353 mL / NET: -1433 mL    A&OX3 in NAD  decreased bs bilat  sternum stable, no drainage  nl s1, s2  abd soft/NT/ND  no peripheral edema  SVG harvest site is healing well  radial artery harvest site is healing well    LABS:                        8.9    11.04 )-----------( 116      ( 26 Jul 2019 01:00 )             26.6     PT/INR - ( 25 Jul 2019 01:00 )   PT: 16.70 sec;   INR: 1.46 ratio      PTT - ( 25 Jul 2019 01:00 )  PTT:42.6 sec  07-26    137  |  103  |  16  ----------------------------<  109<H>  3.6   |  24  |  0.8    Ca    8.2<L>      26 Jul 2019 01:00  Mg     2.3     07-26    TPro  5.9<L>  /  Alb  4.4  /  TBili  1.7<H>  /  DBili  x   /  AST  39  /  ALT  29  /  AlkPhos  76  07-25    MEDICATIONS  (STANDING):  albumin human  5% IVPB 3000 milliLiter(s) IV Intermittent once  ALBUTerol/ipratropium for Nebulization 3 milliLiter(s) Nebulizer every 6 hours  aspirin enteric coated 325 milliGRAM(s) Oral daily  atorvastatin 40 milliGRAM(s) Oral at bedtime  docusate sodium 100 milliGRAM(s) Oral three times a day  famotidine    Tablet 20 milliGRAM(s) Oral daily  guaiFENesin  milliGRAM(s) Oral every 12 hours  heparin  Injectable 5000 Unit(s) SubCutaneous every 8 hours  insulin glargine Injectable (LANTUS) 20 Unit(s) SubCutaneous every morning  insulin lispro (HumaLOG) corrective regimen sliding scale   SubCutaneous three times a day before meals  insulin lispro Injectable (HumaLOG) 5 Unit(s) SubCutaneous three times a day before meals  isosorbide   dinitrate Tablet (ISORDIL) 5 milliGRAM(s) Oral three times a day  magnesium sulfate  IVPB 1 Gram(s) IV Intermittent every 12 hours  metoprolol tartrate 12.5 milliGRAM(s) Oral every 12 hours  senna 2 Tablet(s) Oral at bedtime  simethicone 80 milliGRAM(s) Chew two times a day    MEDICATIONS  (PRN):  bisacodyl 5 milliGRAM(s) Oral every 12 hours PRN Constipation  dextrose 40% Gel 15 Gram(s) Oral once PRN Blood Glucose LESS THAN 70 milliGRAM(s)/deciliter  glucagon  Injectable 1 milliGRAM(s) IntraMuscular once PRN Glucose LESS THAN 70 milligrams/deciliter  ketorolac   Injectable 30 milliGRAM(s) IV Push every 6 hours PRN Moderate Pain (4 - 6)  oxyCODONE    IR 5 milliGRAM(s) Oral every 4 hours PRN Moderate Pain (4 - 6)  oxyCODONE    IR 10 milliGRAM(s) Oral every 4 hours PRN Severe Pain (7 - 10)

## 2019-07-26 NOTE — PROGRESS NOTE ADULT - ATTENDING COMMENTS
64y Male with PMH of htn, BPPV presented today for elective cardiac catheterization secondary to positive nuclear stress test done for intermittent chest pain and dizziness.   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 CTS consulted for CABG evaluation  pt is recommended to undergo surgical revascularization due to diffuse nature of his disease  preop completed  OR today  summary of preop w/u:  carotids 07/22/2019, CT scan, PFTs OK  verify now is 248
POD 1 after C3IMA  doing well  start ASA/SQH  bb/nitro for radial held as patient was on pressors  hold diuresis  patient may need volume (+1.1L/24hrs)  Cont pulmonary toilet, Chest PT, pain control, Incentive spirometry  OOB ambulate  case d/w CTU team
POD 2 after C3IMA  doing well  cont ASA/SQH/bb/nitro/statin  start diuresis (+1.6L/24hrs plus +1.1L for 24hrs the day before)  Cont pulmonary toilet, Chest PT, pain control, Incentive spirometry  OOB ambulate  case d/w CTU team
POD 3 after C3IMA  doing well  cont ASA/SQH/bb/nitro/statin  start diuresis (-1.4L/24hrs)  Cont pulmonary toilet, Chest PT, pain control, Incentive spirometry  OOB ambulate  case d/w CTU team

## 2019-07-26 NOTE — PROGRESS NOTE ADULT - SUBJECTIVE AND OBJECTIVE BOX
CTU Attending Progress Daily Note     26 Jul 2019 09:20  Admited 07-22-19, Hospital Day 4d  POD# - 3    HPI:  64 male with HTN presenting for elective LHC. Positive stress test, as per patient's daughter he could not finish a treadmill stress test. (22 Jul 2019 07:45)    Home Medications:  atorvastatin 20 mg oral tablet: 1 tab(s) orally once a day (22 Jul 2019 07:25)  metoprolol succinate 50 mg oral tablet, extended release: 1 tab(s) orally once a day (22 Jul 2019 07:25)  Plavix 75 mg oral tablet: 1 tab(s) orally once a day (22 Jul 2019 07:25)    FAMILY HISTORY:  No pertinent family history in first degree relatives    PAST MEDICAL & SURGICAL HISTORY:  Vertigo    Interval event for past 24 hr:  JAVIER CORONA  64y had no event.   Current Complains:  JAVIER CORONA has no new complains  Allergies    No Known Allergies    Intolerances      OBJECTIVE:  Vitals last 24 hrs  T(C): 37.8 (07-26-19 @ 08:00), Max: 37.8 (07-26-19 @ 08:00)  T(F): 100 (07-26-19 @ 08:00), Max: 100 (07-26-19 @ 08:00)  HR: 83 (07-26-19 @ 08:00) (79 - 87)  BP: 113/65 (07-26-19 @ 08:00) (104/57 - 142/71)  ABP: --  ABP(mean): --  RR: 28 (07-26-19 @ 08:00) (17 - 32)  SpO2: 99% (07-26-19 @ 08:00) (93% - 99%)      07-25-19 @ 07:01  -  07-26-19 @ 07:00  --------------------------------------------------------  IN:    IV PiggyBack: 350 mL    Oral Fluid: 570 mL  Total IN: 920 mL    OUT:    Indwelling Catheter - Urethral: 1053 mL    Voided: 1300 mL  Total OUT: 2353 mL    Total NET: -1433 mL          CAPILLARY BLOOD GLUCOSE  113 (25 Jul 2019 22:00)      POCT Blood Glucose.: 122 mg/dL (26 Jul 2019 06:50)  POCT Blood Glucose.: 113 mg/dL (25 Jul 2019 22:01)  POCT Blood Glucose.: 118 mg/dL (25 Jul 2019 16:42)  POCT Blood Glucose.: 131 mg/dL (25 Jul 2019 10:46)  POCT Blood Glucose.: 137 mg/dL (25 Jul 2019 09:57)    LABS:  ABG - ( 25 Jul 2019 05:22 )  pH, Arterial: 7.38  pH, Blood: x     /  pCO2: 38    /  pO2: 73    / HCO3: 22    / Base Excess: -2.4  /  SaO2: 94                            8.9    11.04 )-----------( 116      ( 26 Jul 2019 01:00 )             26.6     Hemoglobin: 8.9 g/dL (07-26 @ 01:00)  Hemoglobin: 9.1 g/dL (07-25 @ 01:00)  Hemoglobin: 9.9 g/dL (07-24 @ 02:00)  Hemoglobin: 11.4 g/dL (07-23 @ 17:22)  Hemoglobin: 11.5 g/dL (07-23 @ 15:22)    07-26    137  |  103  |  16  ----------------------------<  109<H>  3.6   |  24  |  0.8    Ca    8.2<L>      26 Jul 2019 01:00  Mg     2.3     07-26    TPro  5.9<L>  /  Alb  4.4  /  TBili  1.7<H>  /  DBili  x   /  AST  39  /  ALT  29  /  AlkPhos  76  07-25    Creatinine, Serum: 0.8 mg/dL (07-26 @ 01:00)  Creatinine, Serum: 0.8 mg/dL (07-25 @ 01:00)  Creatinine, Serum: 1.0 mg/dL (07-24 @ 02:00)  Creatinine, Serum: 1.0 mg/dL (07-23 @ 17:22)  Creatinine, Serum: 1.0 mg/dL (07-23 @ 04:20)  Creatinine, Serum: 1.0 mg/dL (07-22 @ 12:03)    PT/INR - ( 25 Jul 2019 01:00 )   PT: 16.70 sec;   INR: 1.46 ratio     PTT - ( 25 Jul 2019 01:00 )  PTT:42.6 sec      HOSPITAL MEDICATIONS:  MEDICATIONS  (STANDING):  albumin human  5% IVPB 3000 milliLiter(s) IV Intermittent once  ALBUTerol/ipratropium for Nebulization 3 milliLiter(s) Nebulizer every 6 hours  aspirin enteric coated 325 milliGRAM(s) Oral daily  atorvastatin 40 milliGRAM(s) Oral at bedtime  bisacodyl 10 milliGRAM(s) Oral once  chlorhexidine 4% Liquid 1 Application(s) Topical <User Schedule>  dextrose 5%. 1000 milliLiter(s) (50 mL/Hr) IV Continuous <Continuous>  dextrose 50% Injectable 50 milliLiter(s) IV Push every 15 minutes  docusate sodium 100 milliGRAM(s) Oral three times a day  famotidine    Tablet 20 milliGRAM(s) Oral daily  guaiFENesin  milliGRAM(s) Oral every 12 hours  heparin  Injectable 5000 Unit(s) SubCutaneous every 8 hours  insulin lispro (HumaLOG) corrective regimen sliding scale   SubCutaneous three times a day before meals  isosorbide   dinitrate Tablet (ISORDIL) 5 milliGRAM(s) Oral three times a day  magnesium sulfate  IVPB 1 Gram(s) IV Intermittent every 12 hours  metoprolol tartrate 25 milliGRAM(s) Oral two times a day  senna 2 Tablet(s) Oral at bedtime  simethicone 80 milliGRAM(s) Chew two times a day    MEDICATIONS  (PRN):  bisacodyl 5 milliGRAM(s) Oral every 12 hours PRN Constipation  dextrose 40% Gel 15 Gram(s) Oral once PRN Blood Glucose LESS THAN 70 milliGRAM(s)/deciliter  glucagon  Injectable 1 milliGRAM(s) IntraMuscular once PRN Glucose LESS THAN 70 milligrams/deciliter  ketorolac   Injectable 30 milliGRAM(s) IV Push every 6 hours PRN Moderate Pain (4 - 6)  oxyCODONE    IR 5 milliGRAM(s) Oral every 4 hours PRN Moderate Pain (4 - 6)  oxyCODONE    IR 10 milliGRAM(s) Oral every 4 hours PRN Severe Pain (7 - 10)      REVIEW OF SYSTEMS:  CONSTITUTIONAL: [X] all negative; [ ] weakness, [ ] fevers, [ ] chills  EYES/ENT: [X] all negative; [ ] visual changes, [ ] vertigo, [ ] throat pain, [ ] eye pain  NECK: [X] all negative; [ ] pain, [ ] stiffness  RESPIRATORY: [ ] all negative, [x ] cough, [ ] wheezing, [ ] hemoptysis, [x ] shortness of breath  CARDIOVASCULAR: [ ] all negative; [x ] chest pain, [ ] palpitations, [ ] orthopnea  GASTROINTESTINAL: [X] all negative; [ ]abdominal pain, [ ] nausea, [ ] vomiting, [ ] hematemesis, [ ] diarrhea, [ ] constipation, [ ] melena, [ ] hematochezia.  GENITOURINARY: [X] all negative; [ ] dysuria, [ ] frequency, [ ] hematuria  NEUROLOGICAL: [X] all negative; [ ] numbness, [ ] weakness, [ ] paresthesias  MUSCULOSKELETAL: [X] all negative, [ ] joint pain, [ ] joint swelling, [ ] joint redness, [ ] bone pain  SKIN: [X] all negative; [ ] itching, [ ] burning, [ ] rashes, [ ] lesions   All other review of systems is negative unless indicated above.    [  ] Unable to assess ROS because     PHYSICAL EXAM:          CONSTITUTIONAL: Well-developed; well-nourished; in no acute distress.   	SKIN: warm, dry, no rashes or lesions  	HEENT: Atraumatic. Normocephalic. PERRL. Moist membranes, no conj injection, sclera clear  	NECK: Supple; non tender.  No JVD. No lymphadenopathy.  	CARD: Normal S1, S2. Rate and Rhythm are regular. No murmurs.  	RESP: decreased air entry left base , no wheezes, no rales no rhonchi.  	ABD: Soft, not tender, not distended, no CVA ttp no rebound no guarding, bowel sounds present  	EXT: Normal ROM.  No clubbing, no cyanosis, + pedal edema, no calf pain b/l, Peripheral pulses intact.  	LYMPH: No acute cervical adenopathy.  	NEURO: Alert, awake, motor 5/5 R, 5/5 L, sensation intact bilat, CN 2-12 intact,          PSYCH: Cooperative, appropriate. Alert & oriented x 3    RADIOLOGY:  X Reviewed and interpreted by me  CxR from 07-26-19 shows mild congestion, no pneumothorax, no effusion, no cardiomegaly,       ECG:  X Reviewed and interpreted by raul RAMIREZ

## 2019-07-26 NOTE — PROGRESS NOTE ADULT - SUBJECTIVE AND OBJECTIVE BOX
OPERATIVE PROCEDURE(s):                POD #                       64yMale  SURGEON(s): MARIE Tinoco  SUBJECTIVE ASSESSMENT:  Patient has no complaints at this time.    Vital Signs Last 24 Hrs  T(F): 100 (26 Jul 2019 08:00), Max: 100 (26 Jul 2019 08:00)  HR: 81 (26 Jul 2019 07:00) (79 - 87)  BP: 142/71 (26 Jul 2019 06:00) (104/57 - 142/71)  BP(mean): 100 (26 Jul 2019 06:00) (74 - 100)  ABP: 126/58 (25 Jul 2019 09:00) (121/56 - 126/58)  ABP(mean): 81 (25 Jul 2019 09:00)  RR: 25 (26 Jul 2019 07:00) (17 - 32)  SpO2: 98% (26 Jul 2019 07:00) (92% - 99%)  CVP(mm Hg): --  CVP(cm H2O): --  CO: --  CI: --  PA: --  SVR: --    I&O's Detail    25 Jul 2019 07:01  -  26 Jul 2019 07:00  --------------------------------------------------------  IN:    IV PiggyBack: 350 mL    Oral Fluid: 570 mL  Total IN: 920 mL    OUT:    Indwelling Catheter - Urethral: 1053 mL    Voided: 1300 mL  Total OUT: 2353 mL        Net:   I&O's Detail    24 Jul 2019 07:01  -  25 Jul 2019 07:00  --------------------------------------------------------  Total NET: 1695.6 mL      25 Jul 2019 07:01  -  26 Jul 2019 07:00  --------------------------------------------------------  Total NET: -1433 mL        CAPILLARY BLOOD GLUCOSE  113 (25 Jul 2019 22:00)      POCT Blood Glucose.: 122 mg/dL (26 Jul 2019 06:50)  POCT Blood Glucose.: 113 mg/dL (25 Jul 2019 22:01)  POCT Blood Glucose.: 118 mg/dL (25 Jul 2019 16:42)  POCT Blood Glucose.: 131 mg/dL (25 Jul 2019 10:46)  POCT Blood Glucose.: 137 mg/dL (25 Jul 2019 09:57)    Physical Exam:  General: NAD; A&Ox3/Patient is intubated and sedated  Cardiac: S1/S2, RRR, no murmur, no rubs  Lungs: unlabored respirations, CTA b/l, no wheeze, no rales, no crackles  Abdomen: Soft/NT/ND; positive bowel sounds x 4  Sternum: Intact, no click, incision healing well with no drainage  Incisions: Incisions clean/dry/intact  Extremities: No edema b/l lower extremities; good capillary refill; no cyanosis; palpable 1+ pedal pulses b/l    Central Venous Catheter: Yes[]  No[] , If Yes indication:           Day #  Tse Catheter: Yes  [] , No  [] , If yes indication:                      Day #  NGT: Yes [] No [] ,    If Yes Placement:                                     Day #  EPICARDIAL WIRES:  [] YES [] NO                                              Day #  BOWEL MOVEMENT:  [] YES [] NO, If No, Timing since last BM:      Day #  CHEST TUBE(Left/Right):  [] YES [] NO, If yes -  AIR LEAKS:  [] YES [] NO        LABS:                        8.9<L>  11.04<H> )-----------( 116<L>    ( 26 Jul 2019 01:00 )             26.6<L>                        9.1<L>  10.88<H> )-----------( 98<L>    ( 25 Jul 2019 01:00 )             27.7<L>    07-26    137  |  103  |  16  ----------------------------<  109<H>  3.6   |  24  |  0.8  07-25    137  |  106  |  16  ----------------------------<  124<H>  4.1   |  22  |  0.8    Ca    8.2<L>      26 Jul 2019 01:00  Mg     2.3     07-26    TPro  5.9<L> [6.0 - 8.0]  /  Alb  4.4 [3.5 - 5.2]  /  TBili  1.7<H> [0.2 - 1.2]  /  DBili  x   /  AST  39 [0 - 41]  /  ALT  29 [0 - 41]  /  AlkPhos  76 [30 - 115]  07-25    PT/INR - ( 25 Jul 2019 01:00 )   PT: ;   INR: 1.46 ratio         PTT - ( 25 Jul 2019 01:00 )  PTT:42.6 sec    ABG - ( 25 Jul 2019 05:22 )  pH: 7.38  /  pCO2: 38    /  pO2: 73    / HCO3: 22    / Base Excess: -2.4  /  SaO2: 94    /  LA: 0.9              RADIOLOGY & ADDITIONAL TESTS:  CXR:   EKG:  MEDICATIONS  (STANDING):  albumin human  5% IVPB 3000 milliLiter(s) IV Intermittent once  ALBUTerol/ipratropium for Nebulization 3 milliLiter(s) Nebulizer every 6 hours  aspirin enteric coated 325 milliGRAM(s) Oral daily  atorvastatin 40 milliGRAM(s) Oral at bedtime  chlorhexidine 4% Liquid 1 Application(s) Topical <User Schedule>  dextrose 5%. 1000 milliLiter(s) (50 mL/Hr) IV Continuous <Continuous>  dextrose 50% Injectable 50 milliLiter(s) IV Push every 15 minutes  docusate sodium 100 milliGRAM(s) Oral three times a day  famotidine    Tablet 20 milliGRAM(s) Oral daily  guaiFENesin  milliGRAM(s) Oral every 12 hours  heparin  Injectable 5000 Unit(s) SubCutaneous every 8 hours  insulin glargine Injectable (LANTUS) 20 Unit(s) SubCutaneous every morning  insulin lispro (HumaLOG) corrective regimen sliding scale   SubCutaneous three times a day before meals  insulin lispro Injectable (HumaLOG) 5 Unit(s) SubCutaneous three times a day before meals  isosorbide   dinitrate Tablet (ISORDIL) 5 milliGRAM(s) Oral three times a day  magnesium sulfate  IVPB 1 Gram(s) IV Intermittent every 12 hours  metoprolol tartrate 25 milliGRAM(s) Oral two times a day  nitroglycerin  Infusion 5 MICROgram(s)/Min (1.5 mL/Hr) IV Continuous <Continuous>  senna 2 Tablet(s) Oral at bedtime  simethicone 80 milliGRAM(s) Chew two times a day  sodium chloride 0.9%. 1000 milliLiter(s) (20 mL/Hr) IV Continuous <Continuous>  vasopressin Infusion 0.04 Unit(s)/Min (2.4 mL/Hr) IV Continuous <Continuous>    MEDICATIONS  (PRN):  bisacodyl 5 milliGRAM(s) Oral every 12 hours PRN Constipation  dextrose 40% Gel 15 Gram(s) Oral once PRN Blood Glucose LESS THAN 70 milliGRAM(s)/deciliter  glucagon  Injectable 1 milliGRAM(s) IntraMuscular once PRN Glucose LESS THAN 70 milligrams/deciliter  ketorolac   Injectable 30 milliGRAM(s) IV Push every 6 hours PRN Moderate Pain (4 - 6)  oxyCODONE    IR 5 milliGRAM(s) Oral every 4 hours PRN Moderate Pain (4 - 6)  oxyCODONE    IR 10 milliGRAM(s) Oral every 4 hours PRN Severe Pain (7 - 10)    HEPARIN:  [] YES [] NO  Dose: XX UNITS/HR UNITS Q8H  LOVENOX:[] YES [] NO  Dose: XX mg Q24H  COUMADIN: []  YES [] NO  Dose: XX mg  Q24H  SCD's: YES b/l  GI Prophylaxis: Protonix [], Pepcid [], None [], (Contra-indication:.....)    Post-Op Aspirin: Yes [],  No [], If No, then contra-indication:  Post-Op Statin: Yes [], No[], If No, then contra-indication:  Post-Op Beta-Blockers: Yes [], No [], If No, then contra-indication:    Allergies:  No Known Allergies      Ambulation/Activity Status: Ambulates several times daily without assistance.    Assessment/Plan:  64y Male status-post .....  - Case and plan discussed with CTU Intensivist and CT Surgeon - Dr. Odonnell/Levy/Jenise   - Continue CTU supportive care    - Continue DVT/GI prophylaxis  - Incentive Spirometry 10 times an hour  - Continue to advance physical activity as tolerated and continue PT/OT as directed  1. CAD: Continue ASA, statin, BB  2. HTN:   3. A. Fib:   4. COPD/Hypoxia:   5. DM/Glucose Control:     Social Service Disposition: OPERATIVE PROCEDURE(s):   CABG + left radial             POD # 3                       64yMale  SURGEON(s): Dr. Burden  SUBJECTIVE ASSESSMENT:  Patient has no complaints at this time.    Vital Signs Last 24 Hrs  T(F): 100 (26 Jul 2019 08:00), Max: 100 (26 Jul 2019 08:00)  HR: 81 (26 Jul 2019 07:00) (79 - 87)  BP: 142/71 (26 Jul 2019 06:00) (104/57 - 142/71)  BP(mean): 100 (26 Jul 2019 06:00) (74 - 100)  ABP: 126/58 (25 Jul 2019 09:00) (121/56 - 126/58)  ABP(mean): 81 (25 Jul 2019 09:00)  RR: 25 (26 Jul 2019 07:00) (17 - 32)  SpO2: 98% (26 Jul 2019 07:00) (92% - 99%)    I&O's Detail    25 Jul 2019 07:01  -  26 Jul 2019 07:00  --------------------------------------------------------  IN:    IV PiggyBack: 350 mL    Oral Fluid: 570 mL  Total IN: 920 mL    OUT:    Indwelling Catheter - Urethral: 1053 mL    Voided: 1300 mL  Total OUT: 2353 mL        Net:   I&O's Detail    24 Jul 2019 07:01  -  25 Jul 2019 07:00  --------------------------------------------------------  Total NET: 1695.6 mL      25 Jul 2019 07:01  -  26 Jul 2019 07:00  --------------------------------------------------------  Total NET: -1433 mL        CAPILLARY BLOOD GLUCOSE  113 (25 Jul 2019 22:00)      POCT Blood Glucose.: 122 mg/dL (26 Jul 2019 06:50)  POCT Blood Glucose.: 113 mg/dL (25 Jul 2019 22:01)  POCT Blood Glucose.: 118 mg/dL (25 Jul 2019 16:42)  POCT Blood Glucose.: 131 mg/dL (25 Jul 2019 10:46)  POCT Blood Glucose.: 137 mg/dL (25 Jul 2019 09:57)    Physical Exam:  General: NAD; A&Ox3  Cardiac: S1/S2, RRR, no murmur, no rubs  Lungs: decreased breath sounds b/l  Abdomen: Soft/NT/ND; positive bowel sounds x 4  Sternum: Intact, no click, incision healing well with no drainage  Incisions: Incisions clean/dry/intact  Extremities: No edema b/l lower extremities; good capillary refill; no cyanosis; palpable 1+ pedal pulses b/l    Central Venous Catheter: Yes[x]  No[] , If Yes indication:  will D/C today         Day # 3  Tse Catheter: Yes  [] , No  [x] , If yes indication:                      Day #  NGT: Yes [] No [x] ,    If Yes Placement:                                     Day #  EPICARDIAL WIRES:  [x] YES [] NO                                              Day # 3  BOWEL MOVEMENT:  [] YES [x] NO, If No, Timing since last BM:      Day #  CHEST TUBE(Left/Right):  [] YES [x] NO, If yes -  AIR LEAKS:  [] YES [] NO        LABS:                        8.9<L>  11.04<H> )-----------( 116<L>    ( 26 Jul 2019 01:00 )             26.6<L>                        9.1<L>  10.88<H> )-----------( 98<L>    ( 25 Jul 2019 01:00 )             27.7<L>    07-26    137  |  103  |  16  ----------------------------<  109<H>  3.6   |  24  |  0.8  07-25    137  |  106  |  16  ----------------------------<  124<H>  4.1   |  22  |  0.8    Ca    8.2<L>      26 Jul 2019 01:00  Mg     2.3     07-26    TPro  5.9<L> [6.0 - 8.0]  /  Alb  4.4 [3.5 - 5.2]  /  TBili  1.7<H> [0.2 - 1.2]  /  DBili  x   /  AST  39 [0 - 41]  /  ALT  29 [0 - 41]  /  AlkPhos  76 [30 - 115]  07-25    PT/INR - ( 25 Jul 2019 01:00 )   PT: ;   INR: 1.46 ratio         PTT - ( 25 Jul 2019 01:00 )  PTT:42.6 sec    ABG - ( 25 Jul 2019 05:22 )  pH: 7.38  /  pCO2: 38    /  pO2: 73    / HCO3: 22    / Base Excess: -2.4  /  SaO2: 94    /  LA: 0.9              RADIOLOGY & ADDITIONAL TESTS:  CXR: EXAM:  XR CHEST PORTABLE ROUTINE 1V            PROCEDURE DATE:  07/26/2019    Impression:      Stable bilateral opacities/effusions.      EKG:  Ventricular Rate 81 BPM    Atrial Rate 81 BPM    P-R Interval 136 ms    QRS Duration 90 ms    Q-T Interval 390 ms    QTC Calculation(Bezet) 453 ms    P Axis 41 degrees    R Axis -11 degrees    T Axis 29 degrees    Diagnosis Line Normal sinus rhythm  Nonspecific ST and T wave abnormality  Abnormal ECG    Confirmed by Dom Jacobs ([id number]) on 7/25/2019 2:33:47 PM    MEDICATIONS  (STANDING):  albumin human  5% IVPB 3000 milliLiter(s) IV Intermittent once  ALBUTerol/ipratropium for Nebulization 3 milliLiter(s) Nebulizer every 6 hours  aspirin enteric coated 325 milliGRAM(s) Oral daily  atorvastatin 40 milliGRAM(s) Oral at bedtime  chlorhexidine 4% Liquid 1 Application(s) Topical <User Schedule>  dextrose 5%. 1000 milliLiter(s) (50 mL/Hr) IV Continuous <Continuous>  dextrose 50% Injectable 50 milliLiter(s) IV Push every 15 minutes  docusate sodium 100 milliGRAM(s) Oral three times a day  famotidine    Tablet 20 milliGRAM(s) Oral daily  guaiFENesin  milliGRAM(s) Oral every 12 hours  heparin  Injectable 5000 Unit(s) SubCutaneous every 8 hours  insulin glargine Injectable (LANTUS) 20 Unit(s) SubCutaneous every morning  insulin lispro (HumaLOG) corrective regimen sliding scale   SubCutaneous three times a day before meals  insulin lispro Injectable (HumaLOG) 5 Unit(s) SubCutaneous three times a day before meals  isosorbide   dinitrate Tablet (ISORDIL) 5 milliGRAM(s) Oral three times a day  magnesium sulfate  IVPB 1 Gram(s) IV Intermittent every 12 hours  metoprolol tartrate 25 milliGRAM(s) Oral two times a day  nitroglycerin  Infusion 5 MICROgram(s)/Min (1.5 mL/Hr) IV Continuous <Continuous>  senna 2 Tablet(s) Oral at bedtime  simethicone 80 milliGRAM(s) Chew two times a day  sodium chloride 0.9%. 1000 milliLiter(s) (20 mL/Hr) IV Continuous <Continuous>  vasopressin Infusion 0.04 Unit(s)/Min (2.4 mL/Hr) IV Continuous <Continuous>    MEDICATIONS  (PRN):  bisacodyl 5 milliGRAM(s) Oral every 12 hours PRN Constipation  dextrose 40% Gel 15 Gram(s) Oral once PRN Blood Glucose LESS THAN 70 milliGRAM(s)/deciliter  glucagon  Injectable 1 milliGRAM(s) IntraMuscular once PRN Glucose LESS THAN 70 milligrams/deciliter  ketorolac   Injectable 30 milliGRAM(s) IV Push every 6 hours PRN Moderate Pain (4 - 6)  oxyCODONE    IR 5 milliGRAM(s) Oral every 4 hours PRN Moderate Pain (4 - 6)  oxyCODONE    IR 10 milliGRAM(s) Oral every 4 hours PRN Severe Pain (7 - 10)    HEPARIN:  [x] YES [] NO  Dose: 5000 UNITS Q8H  LOVENOX:[] YES [x] NO  Dose: XX mg Q24H  COUMADIN: []  YES [x] NO  Dose: XX mg  Q24H  SCD's: YES b/l  GI Prophylaxis: Protonix [], Pepcid [x], None [], (Contra-indication:.....)    Post-Op Aspirin: Yes [x],  No [], If No, then contra-indication:  Post-Op Statin: Yes [x], No[], If No, then contra-indication:  Post-Op Beta-Blockers: Yes [x], No [], If No, then contra-indication:    Allergies:  No Known Allergies      Ambulation/Activity Status: Ambulates out of bed to chair     Assessment/Plan:  64y Male status-post CABG + left radial POD#3  - Case and plan discussed with CTU Intensivist and CT Surgeon - Dr. Burden   - Continue CTU supportive care    - Continue DVT/GI prophylaxis  - Incentive Spirometry 10 times an hour  - Continue to advance physical activity as tolerated and continue PT/OT as directed  1. CAD: Continue ASA, statin, BB  2. HTN: cont BB  3. A. Fib: mag sulfate 1gm  4. COPD/Hypoxia: cont duonebs, mucinex, wean O2 as tolerated  5. DM/Glucose Control: d/c lantus and humalog, remain on insulin sliding scale  6. d/c central line     Social Service Disposition:

## 2019-07-27 LAB
ANION GAP SERPL CALC-SCNC: 12 MMOL/L — SIGNIFICANT CHANGE UP (ref 7–14)
BASOPHILS # BLD AUTO: 0.03 K/UL — SIGNIFICANT CHANGE UP (ref 0–0.2)
BASOPHILS NFR BLD AUTO: 0.3 % — SIGNIFICANT CHANGE UP (ref 0–1)
BUN SERPL-MCNC: 13 MG/DL — SIGNIFICANT CHANGE UP (ref 10–20)
CALCIUM SERPL-MCNC: 8.5 MG/DL — SIGNIFICANT CHANGE UP (ref 8.5–10.1)
CHLORIDE SERPL-SCNC: 102 MMOL/L — SIGNIFICANT CHANGE UP (ref 98–110)
CO2 SERPL-SCNC: 25 MMOL/L — SIGNIFICANT CHANGE UP (ref 17–32)
CREAT SERPL-MCNC: 0.9 MG/DL — SIGNIFICANT CHANGE UP (ref 0.7–1.5)
EOSINOPHIL # BLD AUTO: 0.17 K/UL — SIGNIFICANT CHANGE UP (ref 0–0.7)
EOSINOPHIL NFR BLD AUTO: 2 % — SIGNIFICANT CHANGE UP (ref 0–8)
GLUCOSE SERPL-MCNC: 110 MG/DL — HIGH (ref 70–99)
HCT VFR BLD CALC: 29.8 % — LOW (ref 42–52)
HGB BLD-MCNC: 10 G/DL — LOW (ref 14–18)
IMM GRANULOCYTES NFR BLD AUTO: 0.7 % — HIGH (ref 0.1–0.3)
LYMPHOCYTES # BLD AUTO: 2.36 K/UL — SIGNIFICANT CHANGE UP (ref 1.2–3.4)
LYMPHOCYTES # BLD AUTO: 27.4 % — SIGNIFICANT CHANGE UP (ref 20.5–51.1)
MAGNESIUM SERPL-MCNC: 2.3 MG/DL — SIGNIFICANT CHANGE UP (ref 1.8–2.4)
MCHC RBC-ENTMCNC: 30.6 PG — SIGNIFICANT CHANGE UP (ref 27–31)
MCHC RBC-ENTMCNC: 33.6 G/DL — SIGNIFICANT CHANGE UP (ref 32–37)
MCV RBC AUTO: 91.1 FL — SIGNIFICANT CHANGE UP (ref 80–94)
MONOCYTES # BLD AUTO: 0.93 K/UL — HIGH (ref 0.1–0.6)
MONOCYTES NFR BLD AUTO: 10.8 % — HIGH (ref 1.7–9.3)
NEUTROPHILS # BLD AUTO: 5.06 K/UL — SIGNIFICANT CHANGE UP (ref 1.4–6.5)
NEUTROPHILS NFR BLD AUTO: 58.8 % — SIGNIFICANT CHANGE UP (ref 42.2–75.2)
NRBC # BLD: 0 /100 WBCS — SIGNIFICANT CHANGE UP (ref 0–0)
PLATELET # BLD AUTO: 214 K/UL — SIGNIFICANT CHANGE UP (ref 130–400)
POTASSIUM SERPL-MCNC: 3.6 MMOL/L — SIGNIFICANT CHANGE UP (ref 3.5–5)
POTASSIUM SERPL-SCNC: 3.6 MMOL/L — SIGNIFICANT CHANGE UP (ref 3.5–5)
RBC # BLD: 3.27 M/UL — LOW (ref 4.7–6.1)
RBC # FLD: 12.2 % — SIGNIFICANT CHANGE UP (ref 11.5–14.5)
SODIUM SERPL-SCNC: 139 MMOL/L — SIGNIFICANT CHANGE UP (ref 135–146)
WBC # BLD: 8.61 K/UL — SIGNIFICANT CHANGE UP (ref 4.8–10.8)
WBC # FLD AUTO: 8.61 K/UL — SIGNIFICANT CHANGE UP (ref 4.8–10.8)

## 2019-07-27 PROCEDURE — 71045 X-RAY EXAM CHEST 1 VIEW: CPT | Mod: 26

## 2019-07-27 PROCEDURE — 99233 SBSQ HOSP IP/OBS HIGH 50: CPT

## 2019-07-27 RX ORDER — POTASSIUM CHLORIDE 20 MEQ
20 PACKET (EA) ORAL ONCE
Refills: 0 | Status: COMPLETED | OUTPATIENT
Start: 2019-07-27 | End: 2019-07-27

## 2019-07-27 RX ORDER — FUROSEMIDE 40 MG
20 TABLET ORAL ONCE
Refills: 0 | Status: COMPLETED | OUTPATIENT
Start: 2019-07-27 | End: 2019-07-27

## 2019-07-27 RX ORDER — LANOLIN ALCOHOL/MO/W.PET/CERES
3 CREAM (GRAM) TOPICAL AT BEDTIME
Refills: 0 | Status: DISCONTINUED | OUTPATIENT
Start: 2019-07-27 | End: 2019-07-29

## 2019-07-27 RX ORDER — POTASSIUM CHLORIDE 20 MEQ
40 PACKET (EA) ORAL ONCE
Refills: 0 | Status: COMPLETED | OUTPATIENT
Start: 2019-07-27 | End: 2019-07-27

## 2019-07-27 RX ADMIN — FAMOTIDINE 20 MILLIGRAM(S): 10 INJECTION INTRAVENOUS at 11:42

## 2019-07-27 RX ADMIN — HEPARIN SODIUM 5000 UNIT(S): 5000 INJECTION INTRAVENOUS; SUBCUTANEOUS at 13:47

## 2019-07-27 RX ADMIN — Medication 100 MILLIGRAM(S): at 13:47

## 2019-07-27 RX ADMIN — Medication 20 MILLIGRAM(S): at 19:04

## 2019-07-27 RX ADMIN — Medication 25 MILLIGRAM(S): at 17:53

## 2019-07-27 RX ADMIN — Medication 100 MILLIGRAM(S): at 22:06

## 2019-07-27 RX ADMIN — Medication 3 MILLIGRAM(S): at 22:08

## 2019-07-27 RX ADMIN — Medication 600 MILLIGRAM(S): at 17:53

## 2019-07-27 RX ADMIN — ISOSORBIDE DINITRATE 5 MILLIGRAM(S): 5 TABLET ORAL at 06:24

## 2019-07-27 RX ADMIN — SIMETHICONE 80 MILLIGRAM(S): 80 TABLET, CHEWABLE ORAL at 06:24

## 2019-07-27 RX ADMIN — Medication 20 MILLIGRAM(S): at 09:17

## 2019-07-27 RX ADMIN — Medication 100 GRAM(S): at 17:53

## 2019-07-27 RX ADMIN — Medication 600 MILLIGRAM(S): at 06:09

## 2019-07-27 RX ADMIN — ISOSORBIDE DINITRATE 5 MILLIGRAM(S): 5 TABLET ORAL at 22:08

## 2019-07-27 RX ADMIN — SENNA PLUS 2 TABLET(S): 8.6 TABLET ORAL at 22:07

## 2019-07-27 RX ADMIN — Medication 3 MILLILITER(S): at 08:27

## 2019-07-27 RX ADMIN — SIMETHICONE 80 MILLIGRAM(S): 80 TABLET, CHEWABLE ORAL at 17:53

## 2019-07-27 RX ADMIN — Medication 100 GRAM(S): at 06:08

## 2019-07-27 RX ADMIN — ATORVASTATIN CALCIUM 40 MILLIGRAM(S): 80 TABLET, FILM COATED ORAL at 22:06

## 2019-07-27 RX ADMIN — Medication 100 MILLIGRAM(S): at 06:09

## 2019-07-27 RX ADMIN — Medication 25 MILLIGRAM(S): at 06:09

## 2019-07-27 RX ADMIN — Medication 40 MILLIEQUIVALENT(S): at 09:17

## 2019-07-27 RX ADMIN — ISOSORBIDE DINITRATE 5 MILLIGRAM(S): 5 TABLET ORAL at 13:48

## 2019-07-27 RX ADMIN — HEPARIN SODIUM 5000 UNIT(S): 5000 INJECTION INTRAVENOUS; SUBCUTANEOUS at 22:07

## 2019-07-27 RX ADMIN — Medication 325 MILLIGRAM(S): at 11:42

## 2019-07-27 RX ADMIN — Medication 40 MILLIEQUIVALENT(S): at 07:04

## 2019-07-27 NOTE — PROGRESS NOTE ADULT - NSHPATTENDINGPLANDISCUSS_GEN_ALL_CORE
CT Surgery / CTICU team during rounds.
CT Surgery / CTICU team during rounds.
CTU Team
CT Surgery / CTICU team during rounds.
CT Surgery / CTICU team during rounds.

## 2019-07-27 NOTE — PROGRESS NOTE ADULT - ASSESSMENT
CTS Attending    Day 4 c/o sob  Borderline BP prevented diuresis yesterday  CxR shows congestion  BUN/Cr 13/0.9  On BB, Nitrates for radial    Will mobilize, diurese, prepare for discharge tomorrow or monday
PROBLEMS:  1.	CAD - S/P CABG - ASA, lipitor, lopressor 12.5  2.	constipation - add miralax and dulcolax  3.	D/c intro  4.	hyperglicemia - resolving - stop lantus and novolog AC    PLAN  Neuro: move all 4 extremities. no sensory or motor deficits  Pain management.   ketorolac   Injectable 30 milliGRAM(s) IV Push every 6 hours PRN    Pulm: Wean off supplemental oxygen as able. Daily CXR. Encourage coughing, deep breathing and use of incentive spirometry.   ALBUTerol/ipratropium for Nebulization 3 milliLiter(s) Nebulizer every 6 hours  guaiFENesin  milliGRAM(s) Oral every 12 hours    Cardio: Monitor telemetry/alarms. Continue supportive care   isosorbide   dinitrate Tablet (ISORDIL) 5 milliGRAM(s) Oral three times a day  metoprolol tartrate 25 milliGRAM(s) Oral two times a day    GI: Continue stool softeners.    bisacodyl 10 milliGRAM(s) Oral once  famotidine    Tablet 20 milliGRAM(s) Oral daily  simethicone 80 milliGRAM(s) Chew two times a day    Nutrition: Continue present diet  Endocrine and glucose control:   atorvastatin 40 milliGRAM(s) Oral at bedtime  dextrose 40% Gel 15 Gram(s) Oral once PRN  glucagon  Injectable 1 milliGRAM(s) IntraMuscular once PRN  insulin lispro (HumaLOG) corrective regimen sliding scale   SubCutaneous three times a day before meals    Renal: monitor urine output, supplement electrolytes as needed,     Vasc: Heparin SC and/or SCDs for DVT prophylaxis  heparin  Injectable 5000 Unit(s) SubCutaneous every 8 hours    ID: Stable, no fever , no chills. Off antibiotics.    Therapy: OOB/ambulate  Disposition: start planing discharge home or placement    Pertinent clinical, laboratory, radiographic, hemodynamic, echocardiographic, respiratory data, microbiologic data and chart were reviewed and analyzed frequently throughout the course of the day and night. GI and DVT prophylaxis, glycemic control, head of bed elevation and skin care issues were addressed.  Patient seen, examined and plan discussed with CT Surgery / CTICU team during rounds.
PROBLEMS:  1.	CAD - S/p CABG - ASA, lopressor, lipitor, isosorbide   2.	fluid overload - lasix  3.	hyperglycemia resolved - stop FS    PLAN  Neuro: move all 4 extremities. no sensory or motor deficits  Pain management.   melatonin 3 milliGRAM(s) Oral at bedtime    Pulm: Wean off supplemental oxygen as able. Daily CXR. Encourage coughing, deep breathing and use of incentive spirometry.   ALBUTerol/ipratropium for Nebulization 3 milliLiter(s) Nebulizer every 6 hours  guaiFENesin  milliGRAM(s) Oral every 12 hours    Cardio: Monitor telemetry/alarms. Continue supportive care   isosorbide   dinitrate Tablet (ISORDIL) 5 milliGRAM(s) Oral three times a day  metoprolol tartrate 25 milliGRAM(s) Oral two times a day    GI: Continue stool softeners.    famotidine    Tablet 20 milliGRAM(s) Oral daily  simethicone 80 milliGRAM(s) Chew two times a day    Nutrition: Continue present diet  Endocrine and glucose control:   atorvastatin 40 milliGRAM(s) Oral at bedtime    Renal: monitor urine output, supplement electrolytes as needed,     Vasc: Heparin SC and/or SCDs for DVT prophylaxis  heparin  Injectable 5000 Unit(s) SubCutaneous every 8 hours    ID: Stable, no fever , no chills. Off antibiotics.    Therapy: OOB/ambulate  Disposition: start planing discharge home or placement    Pertinent clinical, laboratory, radiographic, hemodynamic, echocardiographic, respiratory data, microbiologic data and chart were reviewed and analyzed frequently throughout the course of the day and night. GI and DVT prophylaxis, glycemic control, head of bed elevation and skin care issues were addressed.  Patient seen, examined and plan discussed with CT Surgery / CTICU team during rounds.
PROBLEMS:  CAD - now has CP - IV NTG and Heparin - to OR today  HTN, titrate NTG to SBP <140    PLAN  Neuro: move all 4 extremities. no sensory or motor deficits  Pain management.   aspirin 325 milliGRAM(s) Oral daily  LORazepam   Injectable 0.5 milliGRAM(s) IV Push once  melatonin 5 milliGRAM(s) Oral at bedtime    Pulm: Wean off supplemental oxygen as able. Daily CXR. Encourage coughing, deep breathing and use of incentive spirometry.     Cardio: Monitor telemetry/alarms. Continue supportive care   nitroglycerin  Infusion 5 MICROgram(s)/Min IV Continuous <Continuous>    GI: Continue stool softeners.    docusate sodium 100 milliGRAM(s) Oral three times a day  pantoprazole    Tablet 40 milliGRAM(s) Oral before breakfast    Nutrition: NPO  Endocrine and glucose control:   atorvastatin 20 milliGRAM(s) Oral at bedtime    Renal: monitor urine output, supplement electrolytes as needed,     Vasc:   heparin  Infusion 1000 Unit(s)/Hr IV Continuous <Continuous>    ID: Stable, no fever , no chills. Off antibiotics.    Therapy: OOB/ambulate  Disposition: start planing discharge home or placement    Pertinent clinical, laboratory, radiographic, hemodynamic, echocardiographic, respiratory data, microbiologic data and chart were reviewed and analyzed frequently throughout the course of the day and night. GI and DVT prophylaxis, glycemic control, head of bed elevation and skin care issues were addressed.  Patient seen, examined and plan discussed with CT Surgery / CTICU team during rounds.
PROBLEMS:  1.	CAD - s/p CABG - ASA, lipitor, lopressor 12.5 q 12 add isosorbide 5 q 8  2.	hyperglycemia - lantus 20 novolog 5  3.	thrombocytopenia - observe     PLAN  Neuro: move all 4 extremities. no sensory or motor deficits  Pain management.   ketorolac   Injectable 30 milliGRAM(s) IV Push every 6 hours PRN    Pulm: Wean off supplemental oxygen as able. Daily CXR. Encourage coughing, deep breathing and use of incentive spirometry.   ALBUTerol/ipratropium for Nebulization 3 milliLiter(s) Nebulizer every 6 hours  guaiFENesin  milliGRAM(s) Oral every 12 hours    Cardio: Monitor telemetry/alarms. Continue supportive care   isosorbide   dinitrate Tablet (ISORDIL) 5 milliGRAM(s) Oral three times a day  metoprolol tartrate 12.5 milliGRAM(s) Oral every 12 hours  nitroglycerin  Infusion 5 MICROgram(s)/Min IV Continuous <Continuous>    GI: Continue stool softeners.    famotidine    Tablet 20 milliGRAM(s) Oral daily  simethicone 80 milliGRAM(s) Chew two times a day    Nutrition: Continue present diet  Endocrine and glucose control:   atorvastatin 40 milliGRAM(s) Oral at bedtime  dextrose 40% Gel 15 Gram(s) Oral once PRN  glucagon  Injectable 1 milliGRAM(s) IntraMuscular once PRN  insulin glargine Injectable (LANTUS) 20 Unit(s) SubCutaneous every morning  insulin lispro (HumaLOG) corrective regimen sliding scale   SubCutaneous three times a day before meals  insulin lispro Injectable (HumaLOG) 5 Unit(s) SubCutaneous three times a day before meals  vasopressin Infusion 0.04 Unit(s)/Min IV Continuous <Continuous>    Renal: monitor urine output, supplement electrolytes as needed,     Vasc: Heparin SC and/or SCDs for DVT prophylaxis  heparin  Injectable 5000 Unit(s) SubCutaneous every 8 hours    ID: Stable, no fever , no chills. Off antibiotics.    Tubes: Monitor Chest tube output  Therapy: OOB/ambulate  Disposition: start planing discharge home or placement    Pertinent clinical, laboratory, radiographic, hemodynamic, echocardiographic, respiratory data, microbiologic data and chart were reviewed and analyzed frequently throughout the course of the day and night. GI and DVT prophylaxis, glycemic control, head of bed elevation and skin care issues were addressed.  Patient seen, examined and plan discussed with CT Surgery / CTICU team during rounds.
PROBLEMS:  1.	CAD s/p CABG - ASA, lipitor 40,   2.	d/c S-G cath, d/c CT  3.	sc heparin  4.	stop NTG - wean of vaso  5.	when BP stable will start lopressor and isosorbide     PLAN  Neuro: move all 4 extremities. no sensory or motor deficits  Pain management.   ketorolac   Injectable 30 milliGRAM(s) IV Push once    Pulm: Wean off supplemental oxygen as able. Daily CXR. Encourage coughing, deep breathing and use of incentive spirometry.   ALBUTerol    90 MICROgram(s) HFA Inhaler 2 Puff(s) Inhalation every 6 hours  guaiFENesin  milliGRAM(s) Oral every 12 hours  ipratropium 17 MICROgram(s) HFA Inhaler 2 Puff(s) Inhalation every 6 hours    Cardio: Monitor telemetry/alarms. Continue supportive care     GI: Continue stool softeners.      Nutrition: Continue present diet  Endocrine and glucose control:   atorvastatin 40 milliGRAM(s) Oral at bedtime  vasopressin Infusion 0.04 Unit(s)/Min IV Continuous <Continuous>    Renal: monitor urine output, supplement electrolytes as needed,     Vasc: Heparin SC and/or SCDs for DVT prophylaxis  heparin  Injectable 5000 Unit(s) SubCutaneous every 8 hours    ID: Stable, no fever , no chills. Off antibiotics.    Tubes: Monitor Chest tube output  Therapy: OOB/ambulate  Disposition: start planing discharge home or placement    Pertinent clinical, laboratory, radiographic, hemodynamic, echocardiographic, respiratory data, microbiologic data and chart were reviewed and analyzed frequently throughout the course of the day and night. GI and DVT prophylaxis, glycemic control, head of bed elevation and skin care issues were addressed.  Patient seen, examined and plan discussed with CT Surgery / CTICU team during rounds.

## 2019-07-27 NOTE — PROGRESS NOTE ADULT - SUBJECTIVE AND OBJECTIVE BOX
CTU Attending Progress Daily Note     27 Jul 2019 11:17  Admited 07-22-19, Hospital Day 5d  POD# - 4    HPI:  64 male with HTN presenting for elective LHC. Positive stress test, as per patient's daughter he could not finish a treadmill stress test. (22 Jul 2019 07:45)    Home Medications:  atorvastatin 20 mg oral tablet: 1 tab(s) orally once a day (22 Jul 2019 07:25)  metoprolol succinate 50 mg oral tablet, extended release: 1 tab(s) orally once a day (22 Jul 2019 07:25)  Plavix 75 mg oral tablet: 1 tab(s) orally once a day (22 Jul 2019 07:25)    FAMILY HISTORY:  No pertinent family history in first degree relatives    PAST MEDICAL & SURGICAL HISTORY:  Vertigo    Interval event for past 24 hr:  JAVIER CORONA  64y had no event.   Current Complains:  JAVIER CORONA has no new complains  Allergies    No Known Allergies    Intolerances      OBJECTIVE:  Vitals last 24 hrs  T(C): 36.8 (07-27-19 @ 08:00), Max: 37.5 (07-26-19 @ 20:00)  T(F): 98.3 (07-27-19 @ 08:00), Max: 99.5 (07-26-19 @ 20:00)  HR: 105 (07-27-19 @ 10:00) (81 - 105)  BP: 123/68 (07-27-19 @ 10:00) (108/57 - 134/78)  ABP: --  ABP(mean): --  RR: 20 (07-27-19 @ 10:00) (11 - 27)  SpO2: 92% (07-27-19 @ 10:00) (92% - 97%)      07-26-19 @ 07:01  -  07-27-19 @ 07:00  --------------------------------------------------------  IN:    IV PiggyBack: 200 mL    Oral Fluid: 690 mL  Total IN: 890 mL    OUT:    Voided: 1400 mL  Total OUT: 1400 mL    Total NET: -510 mL          CAPILLARY BLOOD GLUCOSE      POCT Blood Glucose.: 100 mg/dL (26 Jul 2019 22:36)  POCT Blood Glucose.: 109 mg/dL (26 Jul 2019 16:34)  POCT Blood Glucose.: 125 mg/dL (26 Jul 2019 11:33)    LABS:                          10.0   8.61  )-----------( 214      ( 27 Jul 2019 05:20 )             29.8     Hemoglobin: 10.0 g/dL (07-27 @ 05:20)  Hemoglobin: 8.9 g/dL (07-26 @ 01:00)  Hemoglobin: 9.1 g/dL (07-25 @ 01:00)    07-27    139  |  102  |  13  ----------------------------<  110<H>  3.6   |  25  |  0.9    Ca    8.5      27 Jul 2019 05:20  Mg     2.3     07-27      Creatinine, Serum: 0.9 mg/dL (07-27 @ 05:20)  Creatinine, Serum: 0.8 mg/dL (07-26 @ 01:00)  Creatinine, Serum: 0.8 mg/dL (07-25 @ 01:00)  Creatinine, Serum: 1.0 mg/dL (07-24 @ 02:00)  Creatinine, Serum: 1.0 mg/dL (07-23 @ 17:22)          HOSPITAL MEDICATIONS:  MEDICATIONS  (STANDING):  ALBUTerol/ipratropium for Nebulization 3 milliLiter(s) Nebulizer every 6 hours  aspirin enteric coated 325 milliGRAM(s) Oral daily  atorvastatin 40 milliGRAM(s) Oral at bedtime  chlorhexidine 4% Liquid 1 Application(s) Topical <User Schedule>  docusate sodium 100 milliGRAM(s) Oral three times a day  famotidine    Tablet 20 milliGRAM(s) Oral daily  guaiFENesin  milliGRAM(s) Oral every 12 hours  heparin  Injectable 5000 Unit(s) SubCutaneous every 8 hours  isosorbide   dinitrate Tablet (ISORDIL) 5 milliGRAM(s) Oral three times a day  magnesium sulfate  IVPB 1 Gram(s) IV Intermittent every 12 hours  melatonin 3 milliGRAM(s) Oral at bedtime  metoprolol tartrate 25 milliGRAM(s) Oral two times a day  senna 2 Tablet(s) Oral at bedtime  simethicone 80 milliGRAM(s) Chew two times a day    MEDICATIONS  (PRN):  bisacodyl 5 milliGRAM(s) Oral every 12 hours PRN Constipation  oxyCODONE    IR 5 milliGRAM(s) Oral every 4 hours PRN Moderate Pain (4 - 6)  oxyCODONE    IR 10 milliGRAM(s) Oral every 4 hours PRN Severe Pain (7 - 10)      REVIEW OF SYSTEMS:  CONSTITUTIONAL: [X] all negative; [ ] weakness, [ ] fevers, [ ] chills  EYES/ENT: [X] all negative; [ ] visual changes, [ ] vertigo, [ ] throat pain, [ ] eye pain  NECK: [X] all negative; [ ] pain, [ ] stiffness  RESPIRATORY: [ ] all negative, [x ] cough, [ ] wheezing, [ ] hemoptysis, [x ] shortness of breath  CARDIOVASCULAR: [ ] all negative; [x ] chest pain, [ ] palpitations, [ ] orthopnea  GASTROINTESTINAL: [X] all negative; [ ]abdominal pain, [ ] nausea, [ ] vomiting, [ ] hematemesis, [ ] diarrhea, [ ] constipation, [ ] melena, [ ] hematochezia.  GENITOURINARY: [X] all negative; [ ] dysuria, [ ] frequency, [ ] hematuria  NEUROLOGICAL: [X] all negative; [ ] numbness, [ ] weakness, [ ] paresthesias  MUSCULOSKELETAL: [X] all negative, [ ] joint pain, [ ] joint swelling, [ ] joint redness, [ ] bone pain  SKIN: [X] all negative; [ ] itching, [ ] burning, [ ] rashes, [ ] lesions   All other review of systems is negative unless indicated above.    [  ] Unable to assess ROS because     PHYSICAL EXAM:          CONSTITUTIONAL: Well-developed; well-nourished; in no acute distress.   	SKIN: warm, dry, no rashes or lesions  	HEENT: Atraumatic. Normocephalic. PERRL. Moist membranes, no conj injection, sclera clear  	NECK: Supple; non tender.  No JVD. No lymphadenopathy.  	CARD: Normal S1, S2. Rate and Rhythm are regular. No murmurs.  	RESP: Good air entry bilaterally, no wheezes, + rales no rhonchi.  	ABD: Soft, not tender, not distended, no CVA ttp no rebound no guarding, bowel sounds present  	EXT: Normal ROM.  No clubbing, no cyanosis, no pedal edema, no calf pain b/l, Peripheral pulses intact.  	LYMPH: No acute cervical adenopathy.  	NEURO: Alert, awake, motor 5/5 R, 5/5 L, sensation intact bilat, CN 2-12 intact,          PSYCH: Cooperative, appropriate. Alert & oriented x 3    RADIOLOGY:  X Reviewed and interpreted by me  CxR from 07-27-19 shows mild congestion, no pneumothorax, no effusion, no cardiomegaly,       ECG:  X Reviewed and interpreted by me - NSR - Sinus tachycardia

## 2019-07-27 NOTE — PROGRESS NOTE ADULT - SUBJECTIVE AND OBJECTIVE BOX
OPERATIVE PROCEDURE(s):                POD #                       64yMale  SURGEON(s): MARIE Tinoco  SUBJECTIVE ASSESSMENT:   Vital Signs Last 24 Hrs  T(F): 98.3 (27 Jul 2019 05:00), Max: 100 (26 Jul 2019 08:00)  HR: 87 (27 Jul 2019 06:13) (81 - 98)  BP: 119/69 (27 Jul 2019 06:13) (112/58 - 134/78)  BP(mean): 89 (27 Jul 2019 06:13) (78 - 101)  ABP: --  ABP(mean): --  RR: 15 (27 Jul 2019 06:13) (11 - 28)  SpO2: 96% (27 Jul 2019 06:13) (93% - 99%)  CVP(mm Hg): --  CVP(cm H2O): --  CO: --  CI: --  PA: --  SVR: --    I&O's Detail    26 Jul 2019 07:01  -  27 Jul 2019 07:00  --------------------------------------------------------  IN:    IV PiggyBack: 200 mL    Oral Fluid: 690 mL  Total IN: 890 mL    OUT:    Voided: 1400 mL  Total OUT: 1400 mL        Net:   I&O's Detail    25 Jul 2019 07:01  -  26 Jul 2019 07:00  --------------------------------------------------------  Total NET: -1433 mL      26 Jul 2019 07:01  -  27 Jul 2019 07:00  --------------------------------------------------------  Total NET: -510 mL        CAPILLARY BLOOD GLUCOSE      POCT Blood Glucose.: 100 mg/dL (26 Jul 2019 22:36)  POCT Blood Glucose.: 109 mg/dL (26 Jul 2019 16:34)  POCT Blood Glucose.: 125 mg/dL (26 Jul 2019 11:33)    Physical Exam:  General: NAD; A&Ox3  Cardiac: S1/S2, RRR, no murmur, no rubs  Lungs: unlabored respirations, CTA b/l, no wheeze, no rales, no crackles  Abdomen: Soft/NT/ND; positive bowel sounds x 4  Sternum: Intact, no click, incision healing well with no drainage  Incisions: Incisions clean/dry/intact  Extremities: No edema b/l lower extremities; good capillary refill; no cyanosis; palpable 1+ pedal pulses b/l    Central Venous Catheter: Yes[]  No[] , If Yes indication:           Day #  Tse Catheter: Yes  [] , No  [] , If yes indication:                      Day #  NGT: Yes [] No [] ,    If Yes Placement:                                     Day #  EPICARDIAL WIRES:  [] YES [] NO                                              Day #  BOWEL MOVEMENT:  [] YES [] NO, If No, Timing since last BM:      Day #  CHEST TUBE(Left/Right):  [] YES [] NO, If yes -  AIR LEAKS:  [] YES [] NO        LABS:                        10.0<L>  8.61  )-----------( 214      ( 27 Jul 2019 05:20 )             29.8<L>                        8.9<L>  11.04<H> )-----------( 116<L>    ( 26 Jul 2019 01:00 )             26.6<L>    07-27    139  |  102  |  13  ----------------------------<  110<H>  3.6   |  25  |  0.9  07-26    137  |  103  |  16  ----------------------------<  109<H>  3.6   |  24  |  0.8    Ca    8.5      27 Jul 2019 05:20  Mg     2.3     07-27    TPro  5.9<L> [6.0 - 8.0]  /  Alb  4.4 [3.5 - 5.2]  /  TBili  1.7<H> [0.2 - 1.2]  /  DBili  x   /  AST  39 [0 - 41]  /  ALT  29 [0 - 41]  /  AlkPhos  76 [30 - 115]  07-25          RADIOLOGY & ADDITIONAL TESTS:  CXR:  EKG:  MEDICATIONS  (STANDING):  albumin human  5% IVPB 3000 milliLiter(s) IV Intermittent once  ALBUTerol/ipratropium for Nebulization 3 milliLiter(s) Nebulizer every 6 hours  aspirin enteric coated 325 milliGRAM(s) Oral daily  atorvastatin 40 milliGRAM(s) Oral at bedtime  chlorhexidine 4% Liquid 1 Application(s) Topical <User Schedule>  dextrose 5%. 1000 milliLiter(s) (50 mL/Hr) IV Continuous <Continuous>  dextrose 50% Injectable 50 milliLiter(s) IV Push every 15 minutes  docusate sodium 100 milliGRAM(s) Oral three times a day  famotidine    Tablet 20 milliGRAM(s) Oral daily  guaiFENesin  milliGRAM(s) Oral every 12 hours  heparin  Injectable 5000 Unit(s) SubCutaneous every 8 hours  insulin lispro (HumaLOG) corrective regimen sliding scale   SubCutaneous three times a day before meals  isosorbide   dinitrate Tablet (ISORDIL) 5 milliGRAM(s) Oral three times a day  magnesium sulfate  IVPB 1 Gram(s) IV Intermittent every 12 hours  metoprolol tartrate 25 milliGRAM(s) Oral two times a day  potassium chloride    Tablet ER 20 milliEquivalent(s) Oral once  potassium chloride    Tablet ER 40 milliEquivalent(s) Oral once  senna 2 Tablet(s) Oral at bedtime  simethicone 80 milliGRAM(s) Chew two times a day    MEDICATIONS  (PRN):  bisacodyl 5 milliGRAM(s) Oral every 12 hours PRN Constipation  dextrose 40% Gel 15 Gram(s) Oral once PRN Blood Glucose LESS THAN 70 milliGRAM(s)/deciliter  glucagon  Injectable 1 milliGRAM(s) IntraMuscular once PRN Glucose LESS THAN 70 milligrams/deciliter  oxyCODONE    IR 5 milliGRAM(s) Oral every 4 hours PRN Moderate Pain (4 - 6)  oxyCODONE    IR 10 milliGRAM(s) Oral every 4 hours PRN Severe Pain (7 - 10)    HEPARIN:  [] YES [] NO  Dose: XX UNITS/HR UNITS Q8H  LOVENOX:[] YES [] NO  Dose: XX mg Q24H  COUMADIN: []  YES [] NO  Dose: XX mg  Q24H  SCD's: YES b/l  GI Prophylaxis: Protonix [], Pepcid [], None [], (Contra-indication:.....)    Post-Op Beta-Blockers: Yes [], No[], If No, then contraindication:  Post-Op Aspirin: Yes [],  No [], If No, then contraindication:  Post-Op Statin: Yes [], No[], If No, then contraindication:  Allergies    No Known Allergies    Intolerances      Ambulation/Activity Status:    Assessment/Plan:  64y Male status-post .....  - Case and plan discussed with CTU Intensivist and CT Surgeon - Dr. Odonnell/Levy/Jenise   - Continue CTU supportive care    - Continue DVT/GI prophylaxis  - Incentive Spirometry 10 times an hour  - Continue to advance physical activity as tolerated and continue PT/OT as directed  1. CAD: Continue ASA, statin, BB  2. HTN:   3. A. Fib:   4. COPD/Hypoxia:   5. DM/Glucose Control:     Social Service Disposition: OPERATIVE PROCEDURE(s):                POD #     4                  64yMale  SURGEON(s): MARIE Tinoco  SUBJECTIVE ASSESSMENT:   post cabg/lima/radial/vein  Vital Signs Last 24 Hrs  T(F): 98.3 (27 Jul 2019 05:00), Max: 100 (26 Jul 2019 08:00)  HR: 87 (27 Jul 2019 06:13) (81 - 98)  BP: 119/69 (27 Jul 2019 06:13) (112/58 - 134/78)  BP(mean): 89 (27 Jul 2019 06:13) (78 - 101)  ABP: --  ABP(mean): --  RR: 15 (27 Jul 2019 06:13) (11 - 28)  SpO2: 96% (27 Jul 2019 06:13) (93% - 99%)  CVP(mm Hg): --  CVP(cm H2O): --  CO: --  CI: --  PA: --  SVR: --    I&O's Detail    26 Jul 2019 07:01  -  27 Jul 2019 07:00  --------------------------------------------------------  IN:    IV PiggyBack: 200 mL    Oral Fluid: 690 mL  Total IN: 890 mL    OUT:    Voided: 1400 mL  Total OUT: 1400 mL        Net:   I&O's Detail    25 Jul 2019 07:01  -  26 Jul 2019 07:00  --------------------------------------------------------  Total NET: -1433 mL      26 Jul 2019 07:01  -  27 Jul 2019 07:00  --------------------------------------------------------  Total NET: -510 mL        CAPILLARY BLOOD GLUCOSE      POCT Blood Glucose.: 100 mg/dL (26 Jul 2019 22:36)  POCT Blood Glucose.: 109 mg/dL (26 Jul 2019 16:34)  POCT Blood Glucose.: 125 mg/dL (26 Jul 2019 11:33)    Physical Exam:  General: NAD; A&Ox3  Cardiac: S1/S2, RRR, no murmur, no rubs  Lungs: unlabored respirations, CTA b/l, no wheeze, no rales, no crackles  Abdomen: Soft/NT/ND; positive bowel sounds x 4  Sternum: Intact, no click, incision healing well with no drainage  Incisions: Incisions clean/dry/intact  Extremities: No edema b/l lower extremities; good capillary refill; no cyanosis; palpable 1+ pedal pulses b/l    Central Venous Catheter: Yes[]  No[] , If Yes indication:           Day #  Tse Catheter: Yes  [] , No  [] , If yes indication:                      Day #  NGT: Yes [] No [] ,    If Yes Placement:                                     Day #  EPICARDIAL WIRES:  [] YES [] NO                                              Day #  BOWEL MOVEMENT:  [] YES [] NO, If No, Timing since last BM:      Day #  CHEST TUBE(Left/Right):  [] YES [] NO, If yes -  AIR LEAKS:  [] YES [] NO        LABS:                        10.0<L>  8.61  )-----------( 214      ( 27 Jul 2019 05:20 )             29.8<L>                        8.9<L>  11.04<H> )-----------( 116<L>    ( 26 Jul 2019 01:00 )             26.6<L>    07-27    139  |  102  |  13  ----------------------------<  110<H>  3.6   |  25  |  0.9  07-26    137  |  103  |  16  ----------------------------<  109<H>  3.6   |  24  |  0.8    Ca    8.5      27 Jul 2019 05:20  Mg     2.3     07-27    TPro  5.9<L> [6.0 - 8.0]  /  Alb  4.4 [3.5 - 5.2]  /  TBili  1.7<H> [0.2 - 1.2]  /  DBili  x   /  AST  39 [0 - 41]  /  ALT  29 [0 - 41]  /  AlkPhos  76 [30 - 115]  07-25          RADIOLOGY & ADDITIONAL TESTS:  CXR:  EKG:  MEDICATIONS  (STANDING):  albumin human  5% IVPB 3000 milliLiter(s) IV Intermittent once  ALBUTerol/ipratropium for Nebulization 3 milliLiter(s) Nebulizer every 6 hours  aspirin enteric coated 325 milliGRAM(s) Oral daily  atorvastatin 40 milliGRAM(s) Oral at bedtime  chlorhexidine 4% Liquid 1 Application(s) Topical <User Schedule>  dextrose 5%. 1000 milliLiter(s) (50 mL/Hr) IV Continuous <Continuous>  dextrose 50% Injectable 50 milliLiter(s) IV Push every 15 minutes  docusate sodium 100 milliGRAM(s) Oral three times a day  famotidine    Tablet 20 milliGRAM(s) Oral daily  guaiFENesin  milliGRAM(s) Oral every 12 hours  heparin  Injectable 5000 Unit(s) SubCutaneous every 8 hours  insulin lispro (HumaLOG) corrective regimen sliding scale   SubCutaneous three times a day before meals  isosorbide   dinitrate Tablet (ISORDIL) 5 milliGRAM(s) Oral three times a day  magnesium sulfate  IVPB 1 Gram(s) IV Intermittent every 12 hours  metoprolol tartrate 25 milliGRAM(s) Oral two times a day  potassium chloride    Tablet ER 20 milliEquivalent(s) Oral once  potassium chloride    Tablet ER 40 milliEquivalent(s) Oral once  senna 2 Tablet(s) Oral at bedtime  simethicone 80 milliGRAM(s) Chew two times a day    MEDICATIONS  (PRN):  bisacodyl 5 milliGRAM(s) Oral every 12 hours PRN Constipation  dextrose 40% Gel 15 Gram(s) Oral once PRN Blood Glucose LESS THAN 70 milliGRAM(s)/deciliter  glucagon  Injectable 1 milliGRAM(s) IntraMuscular once PRN Glucose LESS THAN 70 milligrams/deciliter  oxyCODONE    IR 5 milliGRAM(s) Oral every 4 hours PRN Moderate Pain (4 - 6)  oxyCODONE    IR 10 milliGRAM(s) Oral every 4 hours PRN Severe Pain (7 - 10)    HEPARIN:  [] YES [] NO  Dose: XX UNITS/HR UNITS Q8H  LOVENOX:[] YES [] NO  Dose: XX mg Q24H  COUMADIN: []  YES [] NO  Dose: XX mg  Q24H  SCD's: YES b/l  GI Prophylaxis: Protonix [], Pepcid [], None [], (Contra-indication:.....)    Post-Op Beta-Blockers: Yes [], No[], If No, then contraindication:  Post-Op Aspirin: Yes [],  No [], If No, then contraindication:  Post-Op Statin: Yes [], No[], If No, then contraindication:  Allergies    No Known Allergies    Intolerances      Ambulation/Activity Status:    Assessment/Plan:  64y Male status-post .....  - Case and plan discussed with CTU Intensivist and CT Surgeon - Dr. Odonnell/Levy/Jenise   - Continue CTU supportive care    - Continue DVT/GI prophylaxis  - Incentive Spirometry 10 times an hour  - Continue to advance physical activity as tolerated and continue PT/OT as directed  1. CAD: Continue ASA, statin, BB  2. HTN:   3. A. Fib:   4. COPD/Hypoxia:   5. DM/Glucose Control:     Social Service Disposition: OPERATIVE PROCEDURE(s):                POD #     4   s/p CABg with radial               64yMale  SURGEON(s): VIRIDIANA Burden  SUBJECTIVE ASSESSMENT:   post cabg/lima/radial/vein, gets dyspneic easily   Vital Signs Last 24 Hrs  T(F): 98.3 (27 Jul 2019 05:00), Max: 100 (26 Jul 2019 08:00)  HR: 87 (27 Jul 2019 06:13) (81 - 98)  BP: 119/69 (27 Jul 2019 06:13) (112/58 - 134/78)  BP(mean): 89 (27 Jul 2019 06:13) (78 - 101)  RR: 15 (27 Jul 2019 06:13) (11 - 28)  SpO2: 96% (27 Jul 2019 06:13) (93% - 99%)    I&O's Detail    26 Jul 2019 07:01  -  27 Jul 2019 07:00  --------------------------------------------------------  IN:    IV PiggyBack: 200 mL    Oral Fluid: 690 mL  Total IN: 890 mL    OUT:    Voided: 1400 mL  Total OUT: 1400 mL      Net:   I&O's Detail    25 Jul 2019 07:01  -  26 Jul 2019 07:00  --------------------------------------------------------  Total NET: -1433 mL      26 Jul 2019 07:01  -  27 Jul 2019 07:00  --------------------------------------------------------  Total NET: -510 mL        CAPILLARY BLOOD GLUCOSE      POCT Blood Glucose.: 100 mg/dL (26 Jul 2019 22:36)  POCT Blood Glucose.: 109 mg/dL (26 Jul 2019 16:34)  POCT Blood Glucose.: 125 mg/dL (26 Jul 2019 11:33)    Physical Exam:  General: NAD; A&Ox3  Cardiac: S1/S2, RRR, no murmur, no rubs  Lungs: shallow unlabored respirations, decreased at bases  Abdomen: Soft/NT/ND;   Sternum: Intact, no click, incision healing well with no drainage  Incisions: Incisions clean/dry/intact, left arm healing well  Extremities: No edema b/l lower extremities;    EPICARDIAL WIRES:  [] YES [] NO                                              Day #  BOWEL MOVEMENT:  [] YES July 26th        LABS:                        10.0<L>  8.61  )-----------( 214      ( 27 Jul 2019 05:20 )             29.8<L>                        8.9<L>  11.04<H> )-----------( 116<L>    ( 26 Jul 2019 01:00 )             26.6<L>    07-27    139  |  102  |  13  ----------------------------<  110<H>  3.6   |  25  |  0.9  07-26    137  |  103  |  16  ----------------------------<  109<H>  3.6   |  24  |  0.8    Ca    8.5      27 Jul 2019 05:20  Mg     2.3     07-27    TPro  5.9<L> [6.0 - 8.0]  /  Alb  4.4 [3.5 - 5.2]  /  TBili  1.7<H> [0.2 - 1.2]  /  DBili  x   /  AST  39 [0 - 41]  /  ALT  29 [0 - 41]  /  AlkPhos  76 [30 - 115]  07-25    RADIOLOGY & ADDITIONAL TESTS:  CXR: < from: Xray Chest 1 View- PORTABLE-Routine (07.27.19 @ 04:07) >  Cardiomegaly, bilateral opacities/pleural effusions, stable.    < end of copied text >    MEDICATIONS  (STANDING):  albumin human  5% IVPB 3000 milliLiter(s) IV Intermittent once  ALBUTerol/ipratropium for Nebulization 3 milliLiter(s) Nebulizer every 6 hours  aspirin enteric coated 325 milliGRAM(s) Oral daily  atorvastatin 40 milliGRAM(s) Oral at bedtime  chlorhexidine 4% Liquid 1 Application(s) Topical <User Schedule>  dextrose 5%. 1000 milliLiter(s) (50 mL/Hr) IV Continuous <Continuous>  dextrose 50% Injectable 50 milliLiter(s) IV Push every 15 minutes  docusate sodium 100 milliGRAM(s) Oral three times a day  famotidine    Tablet 20 milliGRAM(s) Oral daily  guaiFENesin  milliGRAM(s) Oral every 12 hours  heparin  Injectable 5000 Unit(s) SubCutaneous every 8 hours  insulin lispro (HumaLOG) corrective regimen sliding scale   SubCutaneous three times a day before meals  isosorbide   dinitrate Tablet (ISORDIL) 5 milliGRAM(s) Oral three times a day  magnesium sulfate  IVPB 1 Gram(s) IV Intermittent every 12 hours  metoprolol tartrate 25 milliGRAM(s) Oral two times a day  potassium chloride    Tablet ER 20 milliEquivalent(s) Oral once  potassium chloride    Tablet ER 40 milliEquivalent(s) Oral once  senna 2 Tablet(s) Oral at bedtime  simethicone 80 milliGRAM(s) Chew two times a day    MEDICATIONS  (PRN):  bisacodyl 5 milliGRAM(s) Oral every 12 hours PRN Constipation  dextrose 40% Gel 15 Gram(s) Oral once PRN Blood Glucose LESS THAN 70 milliGRAM(s)/deciliter  glucagon  Injectable 1 milliGRAM(s) IntraMuscular once PRN Glucose LESS THAN 70 milligrams/deciliter  oxyCODONE    IR 5 milliGRAM(s) Oral every 4 hours PRN Moderate Pain (4 - 6)  oxyCODONE    IR 10 milliGRAM(s) Oral every 4 hours PRN Severe Pain (7 - 10)    Allergies    No Known Allergies    Intolerances      Ambulation/Activity Status:    Assessment/Plan:  64y Male status-post CABg with radial  - Case and plan discussed with CTU Intensivist and CT Surgeon - Dr. deluca  - Continue CTU supportive care    - Continue DVT/GI prophylaxis  - Incentive Spirometry 10 times an hour  - Continue to advance physical activity as tolerated and continue PT/OT as directed  1. CAD: Continue ASA, statin, BB  2. HTN: BB  3. A. Fib: mag supplement  4. COPD/Hypoxia: mucinex, incentive and nebulizers  5. DM/Glucose Control: controlled  6. CXR little congested will diurese with lasix  7. hold heparin in morning for wire removal     Social Service Disposition:  anticipate home within next 48 hours

## 2019-07-28 LAB
ANION GAP SERPL CALC-SCNC: 12 MMOL/L — SIGNIFICANT CHANGE UP (ref 7–14)
ANION GAP SERPL CALC-SCNC: 17 MMOL/L — HIGH (ref 7–14)
BASOPHILS # BLD AUTO: 0.04 K/UL — SIGNIFICANT CHANGE UP (ref 0–0.2)
BASOPHILS NFR BLD AUTO: 0.4 % — SIGNIFICANT CHANGE UP (ref 0–1)
BUN SERPL-MCNC: 15 MG/DL — SIGNIFICANT CHANGE UP (ref 10–20)
BUN SERPL-MCNC: 18 MG/DL — SIGNIFICANT CHANGE UP (ref 10–20)
CALCIUM SERPL-MCNC: 8.3 MG/DL — LOW (ref 8.5–10.1)
CALCIUM SERPL-MCNC: 9 MG/DL — SIGNIFICANT CHANGE UP (ref 8.5–10.1)
CHLORIDE SERPL-SCNC: 100 MMOL/L — SIGNIFICANT CHANGE UP (ref 98–110)
CHLORIDE SERPL-SCNC: 104 MMOL/L — SIGNIFICANT CHANGE UP (ref 98–110)
CO2 SERPL-SCNC: 22 MMOL/L — SIGNIFICANT CHANGE UP (ref 17–32)
CO2 SERPL-SCNC: 23 MMOL/L — SIGNIFICANT CHANGE UP (ref 17–32)
CREAT SERPL-MCNC: 0.9 MG/DL — SIGNIFICANT CHANGE UP (ref 0.7–1.5)
CREAT SERPL-MCNC: 0.9 MG/DL — SIGNIFICANT CHANGE UP (ref 0.7–1.5)
EOSINOPHIL # BLD AUTO: 0.4 K/UL — SIGNIFICANT CHANGE UP (ref 0–0.7)
EOSINOPHIL NFR BLD AUTO: 4.5 % — SIGNIFICANT CHANGE UP (ref 0–8)
GLUCOSE SERPL-MCNC: 108 MG/DL — HIGH (ref 70–99)
GLUCOSE SERPL-MCNC: 147 MG/DL — HIGH (ref 70–99)
HCT VFR BLD CALC: 30.2 % — LOW (ref 42–52)
HGB BLD-MCNC: 10 G/DL — LOW (ref 14–18)
IMM GRANULOCYTES NFR BLD AUTO: 0.8 % — HIGH (ref 0.1–0.3)
LYMPHOCYTES # BLD AUTO: 3.46 K/UL — HIGH (ref 1.2–3.4)
LYMPHOCYTES # BLD AUTO: 38.7 % — SIGNIFICANT CHANGE UP (ref 20.5–51.1)
MAGNESIUM SERPL-MCNC: 2.2 MG/DL — SIGNIFICANT CHANGE UP (ref 1.8–2.4)
MAGNESIUM SERPL-MCNC: 2.2 MG/DL — SIGNIFICANT CHANGE UP (ref 1.8–2.4)
MCHC RBC-ENTMCNC: 30.1 PG — SIGNIFICANT CHANGE UP (ref 27–31)
MCHC RBC-ENTMCNC: 33.1 G/DL — SIGNIFICANT CHANGE UP (ref 32–37)
MCV RBC AUTO: 91 FL — SIGNIFICANT CHANGE UP (ref 80–94)
MONOCYTES # BLD AUTO: 1.06 K/UL — HIGH (ref 0.1–0.6)
MONOCYTES NFR BLD AUTO: 11.8 % — HIGH (ref 1.7–9.3)
NEUTROPHILS # BLD AUTO: 3.92 K/UL — SIGNIFICANT CHANGE UP (ref 1.4–6.5)
NEUTROPHILS NFR BLD AUTO: 43.8 % — SIGNIFICANT CHANGE UP (ref 42.2–75.2)
NRBC # BLD: 0 /100 WBCS — SIGNIFICANT CHANGE UP (ref 0–0)
PLATELET # BLD AUTO: 209 K/UL — SIGNIFICANT CHANGE UP (ref 130–400)
POTASSIUM SERPL-MCNC: 4.1 MMOL/L — SIGNIFICANT CHANGE UP (ref 3.5–5)
POTASSIUM SERPL-MCNC: 4.2 MMOL/L — SIGNIFICANT CHANGE UP (ref 3.5–5)
POTASSIUM SERPL-SCNC: 4.1 MMOL/L — SIGNIFICANT CHANGE UP (ref 3.5–5)
POTASSIUM SERPL-SCNC: 4.2 MMOL/L — SIGNIFICANT CHANGE UP (ref 3.5–5)
RBC # BLD: 3.32 M/UL — LOW (ref 4.7–6.1)
RBC # FLD: 12.3 % — SIGNIFICANT CHANGE UP (ref 11.5–14.5)
SODIUM SERPL-SCNC: 139 MMOL/L — SIGNIFICANT CHANGE UP (ref 135–146)
SODIUM SERPL-SCNC: 139 MMOL/L — SIGNIFICANT CHANGE UP (ref 135–146)
WBC # BLD: 8.95 K/UL — SIGNIFICANT CHANGE UP (ref 4.8–10.8)
WBC # FLD AUTO: 8.95 K/UL — SIGNIFICANT CHANGE UP (ref 4.8–10.8)

## 2019-07-28 PROCEDURE — 32555 ASPIRATE PLEURA W/ IMAGING: CPT

## 2019-07-28 PROCEDURE — 71045 X-RAY EXAM CHEST 1 VIEW: CPT | Mod: 26,77

## 2019-07-28 PROCEDURE — 99233 SBSQ HOSP IP/OBS HIGH 50: CPT | Mod: 25

## 2019-07-28 RX ORDER — FUROSEMIDE 40 MG
20 TABLET ORAL ONCE
Refills: 0 | Status: COMPLETED | OUTPATIENT
Start: 2019-07-28 | End: 2019-07-28

## 2019-07-28 RX ADMIN — Medication 3 MILLIGRAM(S): at 21:29

## 2019-07-28 RX ADMIN — ISOSORBIDE DINITRATE 5 MILLIGRAM(S): 5 TABLET ORAL at 05:45

## 2019-07-28 RX ADMIN — HEPARIN SODIUM 5000 UNIT(S): 5000 INJECTION INTRAVENOUS; SUBCUTANEOUS at 21:29

## 2019-07-28 RX ADMIN — HEPARIN SODIUM 5000 UNIT(S): 5000 INJECTION INTRAVENOUS; SUBCUTANEOUS at 13:25

## 2019-07-28 RX ADMIN — Medication 100 MILLIGRAM(S): at 21:29

## 2019-07-28 RX ADMIN — Medication 25 MILLIGRAM(S): at 05:45

## 2019-07-28 RX ADMIN — Medication 325 MILLIGRAM(S): at 12:18

## 2019-07-28 RX ADMIN — Medication 3 MILLILITER(S): at 13:08

## 2019-07-28 RX ADMIN — Medication 20 MILLIGRAM(S): at 09:07

## 2019-07-28 RX ADMIN — Medication 600 MILLIGRAM(S): at 18:07

## 2019-07-28 RX ADMIN — SIMETHICONE 80 MILLIGRAM(S): 80 TABLET, CHEWABLE ORAL at 05:45

## 2019-07-28 RX ADMIN — ATORVASTATIN CALCIUM 40 MILLIGRAM(S): 80 TABLET, FILM COATED ORAL at 21:29

## 2019-07-28 RX ADMIN — Medication 3 MILLILITER(S): at 08:23

## 2019-07-28 RX ADMIN — Medication 3 MILLILITER(S): at 19:50

## 2019-07-28 RX ADMIN — Medication 100 GRAM(S): at 18:07

## 2019-07-28 RX ADMIN — Medication 100 GRAM(S): at 05:46

## 2019-07-28 RX ADMIN — Medication 100 MILLIGRAM(S): at 05:46

## 2019-07-28 RX ADMIN — Medication 100 MILLIGRAM(S): at 13:25

## 2019-07-28 RX ADMIN — Medication 600 MILLIGRAM(S): at 05:45

## 2019-07-28 RX ADMIN — ISOSORBIDE DINITRATE 5 MILLIGRAM(S): 5 TABLET ORAL at 13:25

## 2019-07-28 RX ADMIN — FAMOTIDINE 20 MILLIGRAM(S): 10 INJECTION INTRAVENOUS at 12:18

## 2019-07-28 RX ADMIN — SIMETHICONE 80 MILLIGRAM(S): 80 TABLET, CHEWABLE ORAL at 18:07

## 2019-07-28 RX ADMIN — Medication 25 MILLIGRAM(S): at 18:07

## 2019-07-28 RX ADMIN — ISOSORBIDE DINITRATE 5 MILLIGRAM(S): 5 TABLET ORAL at 21:29

## 2019-07-28 NOTE — PROCEDURE NOTE - NSPOSTPRCRAD_GEN_A_CORE
No pnthx on bedside US post procedure/no pneumothorax
post-procedure radiography performed/central line located in the superior vena cava/no pneumothorax

## 2019-07-28 NOTE — PROGRESS NOTE ADULT - SUBJECTIVE AND OBJECTIVE BOX
64 yo M former smoker with HTN who presented to the hospital for elective cardiac cath as he had a positive nuclear stress test which was done for intermittent chest pain and dizziness. He was found to have Left main, with multivessel disease.     POD #5 CABG x3 with radial harvest, EF 55-60%.    Vital Signs Last 24 Hrs  T(C): 36.8 (28 Jul 2019 08:00), Max: 37.4 (27 Jul 2019 16:00)  T(F): 98.3 (28 Jul 2019 08:00), Max: 99.3 (27 Jul 2019 16:00)  HR: 100 (28 Jul 2019 09:15) (77 - 105)  BP: 142/74 (28 Jul 2019 09:15) (91/51 - 142/74)  BP(mean): 98 (28 Jul 2019 09:15) (65 - 98)  RR: 20 (28 Jul 2019 06:00) (16 - 25)  SpO2: 89% (28 Jul 2019 09:15) (89% - 98%)    alert, awake, verbal  follows commands  Clean sternal incision  S1S2 reg rate  b/l air entry, diminished effort at bases  soft abdomen, non tender non distended  warm periphery, b/l pedal edema  Good ROM, power 5/5 b/l  left arm and left left has a large hematoma    WBC 8.95, Hg 10, plt 209  BUN 15, Cr 0.9    CXR - b/l congestion, b/l pleural effusions    Bedside sono: medium to large right pleural effusion and small-medium left pleural effusion.    a/p:    As patient is dyspneic on ambulation will perform right sided thoracentesis today  Keep O2 sat at 94% or above. Currently on 3-4 Liter NC the sat is 94-95%  cont with lasix daily, goal is negative fluid balance  Replace lytes PRN. Repeat BMP and mag in afternoon.  PO ASA, Statin, Beta blocker 25 q12 and Isordil 5 q8  D/c sternal wires.  Fluid restrict to 1 liter per day  OOB to chair, encourage IS  I explained the plan of care for today to the patient in [place] 66 yo M former smoker with HTN who presented to the hospital for elective cardiac cath as he had a positive nuclear stress test which was done for intermittent chest pain and dizziness. He was found to have Left main, with multivessel disease.     POD #5 CABG x3 with radial harvest, EF 55-60%.    Vital Signs Last 24 Hrs  T(C): 36.8 (28 Jul 2019 08:00), Max: 37.4 (27 Jul 2019 16:00)  T(F): 98.3 (28 Jul 2019 08:00), Max: 99.3 (27 Jul 2019 16:00)  HR: 100 (28 Jul 2019 09:15) (77 - 105)  BP: 142/74 (28 Jul 2019 09:15) (91/51 - 142/74)  BP(mean): 98 (28 Jul 2019 09:15) (65 - 98)  RR: 20 (28 Jul 2019 06:00) (16 - 25)  SpO2: 89% (28 Jul 2019 09:15) (89% - 98%)    alert, awake, verbal  follows commands  Clean sternal incision  S1S2 reg rate  b/l air entry, diminished effort at bases  soft abdomen, non tender non distended  warm periphery, b/l pedal edema  Good ROM, power 5/5 b/l  left arm and left left has a large hematoma    WBC 8.95, Hg 10, plt 209  BUN 15, Cr 0.9    CXR - b/l congestion, b/l pleural effusions    Bedside sono: medium to large left pleural effusion and small-medium right pleural effusion.    a/p:    As patient is dyspneic on ambulation will perform left sided thoracentesis today  Keep O2 sat at 94% or above. Currently on 3-4 Liter NC the sat is 94-95%  cont with lasix daily, goal is negative fluid balance  Replace lytes PRN. Repeat BMP and mag in afternoon.  PO ASA, Statin, Beta blocker 25 q12 and Isordil 5 q8  D/c pacing wires.  Fluid restrict to 1 liter per day  OOB to chair, encourage IS  I explained the plan of care for today to the patient in [place]

## 2019-07-28 NOTE — PROCEDURE NOTE - NSPOSTCAREGUIDE_GEN_A_CORE
Verbal/written post procedure instructions were given to patient/caregiver
Care for catheter as per unit/ICU protocols
Verbal/written post procedure instructions were given to patient/caregiver/Care for catheter as per unit/ICU protocols

## 2019-07-28 NOTE — PROCEDURE NOTE - NSPROCSEDATIONNOT_GEN_ALL_CORE
Feels well  No complaints  Desires R C/S  Scheduled with me on Thursday 8/6/18 at 0800  Pt aware 
No

## 2019-07-28 NOTE — PROGRESS NOTE ADULT - SUBJECTIVE AND OBJECTIVE BOX
OPERATIVE PROCEDURE(s):                POD # 5 s/p CABg with radial                      64yMale  SURGEON(s): VIRIDIANA Burden  SUBJECTIVE ASSESSMENT: wanting to go home  Vital Signs Last 24 Hrs  T(F): 98.6 (28 Jul 2019 00:00), Max: 99.3 (27 Jul 2019 16:00)  HR: 91 (28 Jul 2019 06:00) (77 - 105)  BP: 122/69 (28 Jul 2019 06:00) (91/51 - 123/68)  BP(mean): 88 (28 Jul 2019 06:00) (65 - 91)  RR: 20 (28 Jul 2019 06:00) (16 - 25)  SpO2: 95% (28 Jul 2019 06:00) (92% - 98%)    I&O's Detail    27 Jul 2019 07:01  -  28 Jul 2019 07:00  --------------------------------------------------------  IN:    IV PiggyBack: 100 mL    Oral Fluid: 860 mL  Total IN: 960 mL    OUT:    Voided: 2570 mL  Total OUT: 2570 mL        Net:   I&O's Detail    26 Jul 2019 07:01  -  27 Jul 2019 07:00  --------------------------------------------------------  Total NET: -510 mL      27 Jul 2019 07:01  -  28 Jul 2019 07:00  --------------------------------------------------------  Total NET: -1610 mL        CAPILLARY BLOOD GLUCOSE        Physical Exam:  General: NAD; A&Ox3  Cardiac: S1/S2, RRR, no murmur, no rubs  Lungs: unlabored respirations, CTA b/l, no wheeze, no rales, no crackles  Abdomen: Soft/NT/ND; positive bowel sounds x 4  Sternum: Intact, no click, incision healing well with no drainage  Incisions: Incisions clean/dry/intact  Extremities: No edema b/l lower extremities; good capillary refill; no cyanosis; palpable 1+ pedal pulses b/l    Central Venous Catheter: Yes[]  No[] , If Yes indication:           Day #  Tse Catheter: Yes  [] , No  [] , If yes indication:                      Day #  NGT: Yes [] No [] ,    If Yes Placement:                                     Day #  EPICARDIAL WIRES:  [] YES [] NO                                              Day #  BOWEL MOVEMENT:  [] YES [] NO, If No, Timing since last BM:      Day #  CHEST TUBE(Left/Right):  [] YES [] NO, If yes -  AIR LEAKS:  [] YES [] NO        LABS:                        10.0<L>  8.95  )-----------( 209      ( 28 Jul 2019 01:10 )             30.2<L>                        10.0<L>  8.61  )-----------( 214      ( 27 Jul 2019 05:20 )             29.8<L>    07-28    139  |  104  |  15  ----------------------------<  108<H>  4.2   |  23  |  0.9  07-27    139  |  102  |  13  ----------------------------<  110<H>  3.6   |  25  |  0.9    Ca    8.3<L>      28 Jul 2019 01:10  Mg     2.2     07-28            RADIOLOGY & ADDITIONAL TESTS:  CXR:  EKG:  MEDICATIONS  (STANDING):  ALBUTerol/ipratropium for Nebulization 3 milliLiter(s) Nebulizer every 6 hours  aspirin enteric coated 325 milliGRAM(s) Oral daily  atorvastatin 40 milliGRAM(s) Oral at bedtime  chlorhexidine 4% Liquid 1 Application(s) Topical <User Schedule>  docusate sodium 100 milliGRAM(s) Oral three times a day  famotidine    Tablet 20 milliGRAM(s) Oral daily  guaiFENesin  milliGRAM(s) Oral every 12 hours  heparin  Injectable 5000 Unit(s) SubCutaneous every 8 hours  isosorbide   dinitrate Tablet (ISORDIL) 5 milliGRAM(s) Oral three times a day  magnesium sulfate  IVPB 1 Gram(s) IV Intermittent every 12 hours  melatonin 3 milliGRAM(s) Oral at bedtime  metoprolol tartrate 25 milliGRAM(s) Oral two times a day  senna 2 Tablet(s) Oral at bedtime  simethicone 80 milliGRAM(s) Chew two times a day    MEDICATIONS  (PRN):  bisacodyl 5 milliGRAM(s) Oral every 12 hours PRN Constipation  oxyCODONE    IR 5 milliGRAM(s) Oral every 4 hours PRN Moderate Pain (4 - 6)  oxyCODONE    IR 10 milliGRAM(s) Oral every 4 hours PRN Severe Pain (7 - 10)    HEPARIN:  [] YES [] NO  Dose: XX UNITS/HR UNITS Q8H  LOVENOX:[] YES [] NO  Dose: XX mg Q24H  COUMADIN: []  YES [] NO  Dose: XX mg  Q24H  SCD's: YES b/l  GI Prophylaxis: Protonix [], Pepcid [], None [], (Contra-indication:.....)    Post-Op Beta-Blockers: Yes [], No[], If No, then contraindication:  Post-Op Aspirin: Yes [],  No [], If No, then contraindication:  Post-Op Statin: Yes [], No[], If No, then contraindication:  Allergies    No Known Allergies    Intolerances      Ambulation/Activity Status:    Assessment/Plan:  64y Male status-post CABg with radial  - Case and plan discussed with CTU Intensivist and CT Surgeon - Dr. deluca  - Continue CTU supportive care    - Continue DVT/GI prophylaxis  - Incentive Spirometry 10 times an hour  - Continue to advance physical activity as tolerated and continue PT/OT as directed  1. CAD: Continue ASA, statin, BB  2. HTN: BB  3. A. Fib: mag supplement  4. COPD/Hypoxia: mucinex, incentive and nebulizers  5. DM/Glucose Control: controlled  6. CXR little congested will diurese with lasix  7. hold heparin in morning for wire removal   Social Service Disposition: OPERATIVE PROCEDURE(s):                POD # 5 s/p CABg with radial                      64yMale  SURGEON(s): VIRIDIANA Burden  SUBJECTIVE ASSESSMENT: wanting to go home, still dyspneic with limited activity, room air sat at rest 91-94%   Vital Signs Last 24 Hrs  T(F): 98.6 (28 Jul 2019 00:00), Max: 99.3 (27 Jul 2019 16:00)  HR: 91 (28 Jul 2019 06:00) (77 - 105)  BP: 122/69 (28 Jul 2019 06:00) (91/51 - 123/68)  BP(mean): 88 (28 Jul 2019 06:00) (65 - 91)  RR: 20 (28 Jul 2019 06:00) (16 - 25)  SpO2: 95% (28 Jul 2019 06:00) (92% - 98%)    I&O's Detail    27 Jul 2019 07:01  -  28 Jul 2019 07:00  --------------------------------------------------------  IN:    IV PiggyBack: 100 mL    Oral Fluid: 860 mL  Total IN: 960 mL    OUT:    Voided: 2570 mL  Total OUT: 2570 mL    Net:   I&O's Detail    26 Jul 2019 07:01  -  27 Jul 2019 07:00  --------------------------------------------------------  Total NET: -510 mL    27 Jul 2019 07:01  -  28 Jul 2019 07:00  --------------------------------------------------------  Total NET: -1610 mL    CAPILLARY BLOOD GLUCOSE    Physical Exam:  General: NAD; A&  Cardiac: S1/S2, RRR, no murmur, no rubs  Lungs: unlabored respirations at rest, bilateral bs decreased at bases, L>R  Abdomen: Soft/NT/ND;   Sternum: Intact, no click, incision healing well with no drainage, removed pacing wires atrial cut and ventricular pulled  Incisions: Incisions clean/dry/intact, + ecchymosis left arm up to axilla, + ecchymosis left thigh, up to groin, firm  Extremities: No edema b/l lower extremities    BOWEL MOVEMENT:  [] YES 7/26      LABS:                        10.0<L>  8.95  )-----------( 209      ( 28 Jul 2019 01:10 )             30.2<L>                        10.0<L>  8.61  )-----------( 214      ( 27 Jul 2019 05:20 )             29.8<L>    07-28    139  |  104  |  15  ----------------------------<  108<H>  4.2   |  23  |  0.9  07-27    139  |  102  |  13  ----------------------------<  110<H>  3.6   |  25  |  0.9    Ca    8.3<L>      28 Jul 2019 01:10  Mg     2.2     07-28    RADIOLOGY & ADDITIONAL TESTS:  CXR: < from: Xray Chest 1 View- PORTABLE-Routine (07.27.19 @ 04:07) >  Cardiomegaly, bilateral opacities/pleural effusions, stable.      MEDICATIONS  (STANDING):  ALBUTerol/ipratropium for Nebulization 3 milliLiter(s) Nebulizer every 6 hours  aspirin enteric coated 325 milliGRAM(s) Oral daily  atorvastatin 40 milliGRAM(s) Oral at bedtime  chlorhexidine 4% Liquid 1 Application(s) Topical <User Schedule>  docusate sodium 100 milliGRAM(s) Oral three times a day  famotidine    Tablet 20 milliGRAM(s) Oral daily  guaiFENesin  milliGRAM(s) Oral every 12 hours  heparin  Injectable 5000 Unit(s) SubCutaneous every 8 hours  isosorbide   dinitrate Tablet (ISORDIL) 5 milliGRAM(s) Oral three times a day  magnesium sulfate  IVPB 1 Gram(s) IV Intermittent every 12 hours  melatonin 3 milliGRAM(s) Oral at bedtime  metoprolol tartrate 25 milliGRAM(s) Oral two times a day  senna 2 Tablet(s) Oral at bedtime  simethicone 80 milliGRAM(s) Chew two times a day    MEDICATIONS  (PRN):  bisacodyl 5 milliGRAM(s) Oral every 12 hours PRN Constipation  oxyCODONE    IR 5 milliGRAM(s) Oral every 4 hours PRN Moderate Pain (4 - 6)  oxyCODONE    IR 10 milliGRAM(s) Oral every 4 hours PRN Severe Pain (7 - 10)    Allergies    No Known Allergies    Intolerances      Ambulation/Activity Status:    Assessment/Plan:  64y Male status-post CABg with radial  - Case and plan discussed with CTU Intensivist and CT Surgeon - Dr. deluca/ Jenise  - Continue CTU supportive care    - Continue DVT/GI prophylaxis  - Incentive Spirometry 10 times an hour  - Continue to advance physical activity as tolerated and continue PT/OT as directed  1. CAD: Continue ASA, statin, BB  2. HTN: BB  3. A. Fib: mag supplement  4. COPD/Hypoxia: mucinex, incentive and nebulizers,   5. DM/Glucose Control: controlled  6. CXR little congested will diurese with lasix,, despite - 1600cc  7. atrial wire cut, ventricular wire removed  8. Ultrasound of chest revealed moderate pleural effusion, discussed with Dr. Burden will treat with throacentesis  Social Service Disposition:  anticipate d/c tomorrow if stable

## 2019-07-29 VITALS — SYSTOLIC BLOOD PRESSURE: 130 MMHG | HEART RATE: 93 BPM | DIASTOLIC BLOOD PRESSURE: 72 MMHG

## 2019-07-29 LAB
ANION GAP SERPL CALC-SCNC: 14 MMOL/L — SIGNIFICANT CHANGE UP (ref 7–14)
BASOPHILS # BLD AUTO: 0.05 K/UL — SIGNIFICANT CHANGE UP (ref 0–0.2)
BASOPHILS NFR BLD AUTO: 0.5 % — SIGNIFICANT CHANGE UP (ref 0–1)
BUN SERPL-MCNC: 17 MG/DL — SIGNIFICANT CHANGE UP (ref 10–20)
CALCIUM SERPL-MCNC: 8.9 MG/DL — SIGNIFICANT CHANGE UP (ref 8.5–10.1)
CHLORIDE SERPL-SCNC: 102 MMOL/L — SIGNIFICANT CHANGE UP (ref 98–110)
CO2 SERPL-SCNC: 23 MMOL/L — SIGNIFICANT CHANGE UP (ref 17–32)
CREAT SERPL-MCNC: 1 MG/DL — SIGNIFICANT CHANGE UP (ref 0.7–1.5)
EOSINOPHIL # BLD AUTO: 0.4 K/UL — SIGNIFICANT CHANGE UP (ref 0–0.7)
EOSINOPHIL NFR BLD AUTO: 4.3 % — SIGNIFICANT CHANGE UP (ref 0–8)
GLUCOSE SERPL-MCNC: 106 MG/DL — HIGH (ref 70–99)
HCT VFR BLD CALC: 32.6 % — LOW (ref 42–52)
HGB BLD-MCNC: 10.7 G/DL — LOW (ref 14–18)
IMM GRANULOCYTES NFR BLD AUTO: 1.9 % — HIGH (ref 0.1–0.3)
LYMPHOCYTES # BLD AUTO: 3.1 K/UL — SIGNIFICANT CHANGE UP (ref 1.2–3.4)
LYMPHOCYTES # BLD AUTO: 33.1 % — SIGNIFICANT CHANGE UP (ref 20.5–51.1)
MAGNESIUM SERPL-MCNC: 2.2 MG/DL — SIGNIFICANT CHANGE UP (ref 1.8–2.4)
MCHC RBC-ENTMCNC: 30.1 PG — SIGNIFICANT CHANGE UP (ref 27–31)
MCHC RBC-ENTMCNC: 32.8 G/DL — SIGNIFICANT CHANGE UP (ref 32–37)
MCV RBC AUTO: 91.8 FL — SIGNIFICANT CHANGE UP (ref 80–94)
MONOCYTES # BLD AUTO: 1.03 K/UL — HIGH (ref 0.1–0.6)
MONOCYTES NFR BLD AUTO: 11 % — HIGH (ref 1.7–9.3)
NEUTROPHILS # BLD AUTO: 4.6 K/UL — SIGNIFICANT CHANGE UP (ref 1.4–6.5)
NEUTROPHILS NFR BLD AUTO: 49.2 % — SIGNIFICANT CHANGE UP (ref 42.2–75.2)
NRBC # BLD: 0 /100 WBCS — SIGNIFICANT CHANGE UP (ref 0–0)
PLATELET # BLD AUTO: 273 K/UL — SIGNIFICANT CHANGE UP (ref 130–400)
POTASSIUM SERPL-MCNC: 4 MMOL/L — SIGNIFICANT CHANGE UP (ref 3.5–5)
POTASSIUM SERPL-SCNC: 4 MMOL/L — SIGNIFICANT CHANGE UP (ref 3.5–5)
RBC # BLD: 3.55 M/UL — LOW (ref 4.7–6.1)
RBC # FLD: 12.4 % — SIGNIFICANT CHANGE UP (ref 11.5–14.5)
SODIUM SERPL-SCNC: 139 MMOL/L — SIGNIFICANT CHANGE UP (ref 135–146)
WBC # BLD: 9.36 K/UL — SIGNIFICANT CHANGE UP (ref 4.8–10.8)
WBC # FLD AUTO: 9.36 K/UL — SIGNIFICANT CHANGE UP (ref 4.8–10.8)

## 2019-07-29 PROCEDURE — 71045 X-RAY EXAM CHEST 1 VIEW: CPT | Mod: 26

## 2019-07-29 RX ORDER — METOPROLOL TARTRATE 50 MG
1 TABLET ORAL
Qty: 60 | Refills: 0
Start: 2019-07-29 | End: 2019-08-27

## 2019-07-29 RX ORDER — METOPROLOL TARTRATE 50 MG
1 TABLET ORAL
Qty: 0 | Refills: 0 | DISCHARGE

## 2019-07-29 RX ORDER — DOCUSATE SODIUM 100 MG
1 CAPSULE ORAL
Qty: 42 | Refills: 0
Start: 2019-07-29 | End: 2019-08-11

## 2019-07-29 RX ORDER — IPRATROPIUM BROMIDE 0.2 MG/ML
1 SOLUTION, NON-ORAL INHALATION EVERY 6 HOURS
Refills: 0 | Status: DISCONTINUED | OUTPATIENT
Start: 2019-07-29 | End: 2019-07-29

## 2019-07-29 RX ORDER — ISOSORBIDE MONONITRATE 60 MG/1
1 TABLET, EXTENDED RELEASE ORAL
Qty: 30 | Refills: 5
Start: 2019-07-29 | End: 2020-01-24

## 2019-07-29 RX ORDER — ASPIRIN/CALCIUM CARB/MAGNESIUM 324 MG
1 TABLET ORAL
Qty: 30 | Refills: 0
Start: 2019-07-29 | End: 2019-08-27

## 2019-07-29 RX ORDER — FAMOTIDINE 10 MG/ML
1 INJECTION INTRAVENOUS
Qty: 14 | Refills: 0
Start: 2019-07-29 | End: 2019-08-11

## 2019-07-29 RX ORDER — CLOPIDOGREL BISULFATE 75 MG/1
1 TABLET, FILM COATED ORAL
Qty: 0 | Refills: 0 | DISCHARGE

## 2019-07-29 RX ORDER — ATORVASTATIN CALCIUM 80 MG/1
1 TABLET, FILM COATED ORAL
Qty: 0 | Refills: 0 | DISCHARGE

## 2019-07-29 RX ORDER — ATORVASTATIN CALCIUM 80 MG/1
1 TABLET, FILM COATED ORAL
Qty: 30 | Refills: 0
Start: 2019-07-29 | End: 2019-08-27

## 2019-07-29 RX ORDER — OXYCODONE HYDROCHLORIDE 5 MG/1
1 TABLET ORAL
Qty: 12 | Refills: 0
Start: 2019-07-29 | End: 2019-07-31

## 2019-07-29 RX ORDER — IPRATROPIUM BROMIDE 0.2 MG/ML
1 SOLUTION, NON-ORAL INHALATION
Qty: 1 | Refills: 0
Start: 2019-07-29 | End: 2019-08-11

## 2019-07-29 RX ADMIN — Medication 100 MILLIGRAM(S): at 05:22

## 2019-07-29 RX ADMIN — Medication 25 MILLIGRAM(S): at 05:22

## 2019-07-29 RX ADMIN — Medication 325 MILLIGRAM(S): at 11:01

## 2019-07-29 RX ADMIN — Medication 100 MILLIGRAM(S): at 13:42

## 2019-07-29 RX ADMIN — Medication 100 GRAM(S): at 06:43

## 2019-07-29 RX ADMIN — FAMOTIDINE 20 MILLIGRAM(S): 10 INJECTION INTRAVENOUS at 11:01

## 2019-07-29 RX ADMIN — ISOSORBIDE DINITRATE 5 MILLIGRAM(S): 5 TABLET ORAL at 13:42

## 2019-07-29 RX ADMIN — Medication 600 MILLIGRAM(S): at 05:22

## 2019-07-29 RX ADMIN — SIMETHICONE 80 MILLIGRAM(S): 80 TABLET, CHEWABLE ORAL at 05:22

## 2019-07-29 RX ADMIN — ISOSORBIDE DINITRATE 5 MILLIGRAM(S): 5 TABLET ORAL at 05:22

## 2019-07-29 RX ADMIN — HEPARIN SODIUM 5000 UNIT(S): 5000 INJECTION INTRAVENOUS; SUBCUTANEOUS at 05:23

## 2019-07-29 RX ADMIN — Medication 3 MILLILITER(S): at 08:02

## 2019-07-29 NOTE — PROGRESS NOTE ADULT - PROVIDER SPECIALTY LIST ADULT
CT Surgery
Critical Care
CT Surgery

## 2019-08-05 ENCOUNTER — APPOINTMENT (OUTPATIENT)
Age: 64
End: 2019-08-05
Payer: MEDICAID

## 2019-08-05 VITALS
HEART RATE: 84 BPM | RESPIRATION RATE: 13 BRPM | BODY MASS INDEX: 30.37 KG/M2 | DIASTOLIC BLOOD PRESSURE: 80 MMHG | SYSTOLIC BLOOD PRESSURE: 131 MMHG | HEIGHT: 66 IN | WEIGHT: 189 LBS | OXYGEN SATURATION: 96 % | TEMPERATURE: 98.5 F

## 2019-08-05 DIAGNOSIS — G47.9 SLEEP DISORDER, UNSPECIFIED: ICD-10-CM

## 2019-08-05 PROCEDURE — 99024 POSTOP FOLLOW-UP VISIT: CPT

## 2019-08-05 RX ORDER — ASPIRIN 325 MG/1
325 TABLET, FILM COATED ORAL
Refills: 0 | Status: DISCONTINUED | COMMUNITY
End: 2019-08-05

## 2019-08-08 DIAGNOSIS — I25.110 ATHEROSCLEROTIC HEART DISEASE OF NATIVE CORONARY ARTERY WITH UNSTABLE ANGINA PECTORIS: ICD-10-CM

## 2019-08-08 DIAGNOSIS — H81.10 BENIGN PAROXYSMAL VERTIGO, UNSPECIFIED EAR: ICD-10-CM

## 2019-08-08 DIAGNOSIS — E11.65 TYPE 2 DIABETES MELLITUS WITH HYPERGLYCEMIA: ICD-10-CM

## 2019-08-08 DIAGNOSIS — Z79.02 LONG TERM (CURRENT) USE OF ANTITHROMBOTICS/ANTIPLATELETS: ICD-10-CM

## 2019-08-08 DIAGNOSIS — Z87.891 PERSONAL HISTORY OF NICOTINE DEPENDENCE: ICD-10-CM

## 2019-08-08 DIAGNOSIS — I10 ESSENTIAL (PRIMARY) HYPERTENSION: ICD-10-CM

## 2019-08-08 DIAGNOSIS — D69.6 THROMBOCYTOPENIA, UNSPECIFIED: ICD-10-CM

## 2019-08-08 DIAGNOSIS — K59.00 CONSTIPATION, UNSPECIFIED: ICD-10-CM

## 2019-08-08 DIAGNOSIS — E87.70 FLUID OVERLOAD, UNSPECIFIED: ICD-10-CM
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
## 2019-08-08 DIAGNOSIS — Z90.49 ACQUIRED ABSENCE OF OTHER SPECIFIED PARTS OF DIGESTIVE TRACT: ICD-10-CM

## 2019-08-08 DIAGNOSIS — J90 PLEURAL EFFUSION, NOT ELSEWHERE CLASSIFIED: ICD-10-CM

## 2019-08-08 DIAGNOSIS — E86.1 HYPOVOLEMIA: ICD-10-CM

## 2019-08-08 DIAGNOSIS — I48.91 UNSPECIFIED ATRIAL FIBRILLATION: ICD-10-CM

## 2019-08-08 DIAGNOSIS — J44.9 CHRONIC OBSTRUCTIVE PULMONARY DISEASE, UNSPECIFIED: ICD-10-CM
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
## 2019-08-08 DIAGNOSIS — L76.32 POSTPROCEDURAL HEMATOMA OF SKIN AND SUBCUTANEOUS TISSUE FOLLOWING OTHER PROCEDURE: ICD-10-CM

## 2019-08-08 DIAGNOSIS — I97.89 OTHER POSTPROCEDURAL COMPLICATIONS AND DISORDERS OF THE CIRCULATORY SYSTEM, NOT ELSEWHERE CLASSIFIED: ICD-10-CM

## 2019-08-19 ENCOUNTER — APPOINTMENT (OUTPATIENT)
Dept: CARDIOLOGY | Facility: CLINIC | Age: 64
End: 2019-08-19
Payer: MEDICAID

## 2019-08-19 ENCOUNTER — APPOINTMENT (OUTPATIENT)
Age: 64
End: 2019-08-19
Payer: MEDICAID

## 2019-08-19 VITALS
HEART RATE: 88 BPM | RESPIRATION RATE: 12 BRPM | OXYGEN SATURATION: 98 % | DIASTOLIC BLOOD PRESSURE: 85 MMHG | HEIGHT: 66 IN | TEMPERATURE: 98.4 F | SYSTOLIC BLOOD PRESSURE: 124 MMHG

## 2019-08-19 PROCEDURE — 99214 OFFICE O/P EST MOD 30 MIN: CPT

## 2019-08-19 PROCEDURE — 93000 ELECTROCARDIOGRAM COMPLETE: CPT

## 2019-08-19 PROCEDURE — 99024 POSTOP FOLLOW-UP VISIT: CPT

## 2019-08-19 RX ORDER — GUAIFENESIN 600 MG
600 TABLET, EXTENDED RELEASE ORAL
Refills: 0 | Status: DISCONTINUED | COMMUNITY
End: 2019-08-19

## 2019-08-19 RX ORDER — DOCUSATE SODIUM 100 MG/1
100 CAPSULE, LIQUID FILLED ORAL
Refills: 0 | Status: DISCONTINUED | COMMUNITY
End: 2019-08-19

## 2019-08-19 RX ORDER — IPRATROPIUM BROMIDE AND ALBUTEROL SULFATE 2.5; .5 MG/3ML; MG/3ML
0.5-2.5 (3) SOLUTION RESPIRATORY (INHALATION)
Refills: 0 | Status: DISCONTINUED | COMMUNITY
End: 2019-08-19

## 2019-08-19 RX ORDER — FAMOTIDINE 20 MG/1
20 TABLET, FILM COATED ORAL
Refills: 0 | Status: DISCONTINUED | COMMUNITY
End: 2019-08-19

## 2019-10-29 ENCOUNTER — APPOINTMENT (OUTPATIENT)
Dept: CARDIOLOGY | Facility: CLINIC | Age: 64
End: 2019-10-29
Payer: MEDICAID

## 2019-10-29 PROCEDURE — 99214 OFFICE O/P EST MOD 30 MIN: CPT

## 2020-01-21 ENCOUNTER — APPOINTMENT (OUTPATIENT)
Dept: CARDIOLOGY | Facility: CLINIC | Age: 65
End: 2020-01-21
Payer: MEDICAID

## 2020-01-21 PROCEDURE — 99214 OFFICE O/P EST MOD 30 MIN: CPT

## 2020-01-21 PROCEDURE — 93000 ELECTROCARDIOGRAM COMPLETE: CPT

## 2020-04-30 ENCOUNTER — APPOINTMENT (OUTPATIENT)
Dept: CARDIOLOGY | Facility: CLINIC | Age: 65
End: 2020-04-30
Payer: MEDICAID

## 2020-04-30 PROCEDURE — 99214 OFFICE O/P EST MOD 30 MIN: CPT | Mod: 95

## 2020-05-11 NOTE — DISCHARGE NOTE PROVIDER - CARE PROVIDER_API CALL
Initiate Treatment: Hydrocortisone 2.5% to face, triamcinolone 0.1% to body mixed with cera ve Detail Level: Zone Boris Burden)  Surgery; Thoracic and Cardiac Surgery  [address] [facility], [address]  [place], [place] [id number]  Phone: [phone]  Fax: [phone]  Follow Up Time:     Edmundo Tinoco)  Cardiovascular Disease; Nuclear Cardiology  [address] [place] [address], [address]  [place] [address]  Phone: [phone]  Fax: [phone]  Follow Up Time:     Naomy Malik)  Internal Medicine  [address] [place] [address]  Phone: [phone]  Fax: [phone]  Follow Up Time:

## 2020-05-14 ENCOUNTER — APPOINTMENT (OUTPATIENT)
Dept: CARDIOLOGY | Facility: CLINIC | Age: 65
End: 2020-05-14

## 2020-05-14 ENCOUNTER — APPOINTMENT (OUTPATIENT)
Dept: CARDIOLOGY | Facility: CLINIC | Age: 65
End: 2020-05-14
Payer: MEDICAID

## 2020-05-14 PROCEDURE — 99213 OFFICE O/P EST LOW 20 MIN: CPT | Mod: 95

## 2020-05-28 ENCOUNTER — APPOINTMENT (OUTPATIENT)
Dept: CARDIOLOGY | Facility: CLINIC | Age: 65
End: 2020-05-28

## 2020-08-04 ENCOUNTER — APPOINTMENT (OUTPATIENT)
Dept: CARDIOLOGY | Facility: CLINIC | Age: 65
End: 2020-08-04
Payer: MEDICAID

## 2020-08-04 ENCOUNTER — RESULT CHARGE (OUTPATIENT)
Age: 65
End: 2020-08-04

## 2020-08-04 VITALS
DIASTOLIC BLOOD PRESSURE: 80 MMHG | HEIGHT: 67 IN | SYSTOLIC BLOOD PRESSURE: 132 MMHG | WEIGHT: 204 LBS | BODY MASS INDEX: 32.02 KG/M2

## 2020-08-04 DIAGNOSIS — Z78.9 OTHER SPECIFIED HEALTH STATUS: ICD-10-CM

## 2020-08-04 DIAGNOSIS — Z87.19 PERSONAL HISTORY OF OTHER DISEASES OF THE DIGESTIVE SYSTEM: ICD-10-CM

## 2020-08-04 DIAGNOSIS — Z87.891 PERSONAL HISTORY OF NICOTINE DEPENDENCE: ICD-10-CM

## 2020-08-04 PROCEDURE — 99213 OFFICE O/P EST LOW 20 MIN: CPT

## 2020-08-04 PROCEDURE — 93000 ELECTROCARDIOGRAM COMPLETE: CPT

## 2020-08-04 RX ORDER — ASPIRIN 325 MG/1
325 TABLET, COATED ORAL
Refills: 0 | Status: DISCONTINUED | COMMUNITY
End: 2020-08-04

## 2020-08-04 RX ORDER — OXYCODONE HYDROCHLORIDE 5 MG/1
5 CAPSULE ORAL
Refills: 0 | Status: DISCONTINUED | COMMUNITY
End: 2020-08-04

## 2020-08-04 RX ORDER — ATORVASTATIN CALCIUM 40 MG/1
40 TABLET, FILM COATED ORAL
Refills: 0 | Status: DISCONTINUED | COMMUNITY
End: 2020-08-04

## 2020-08-04 NOTE — REVIEW OF SYSTEMS
[Negative] : Endocrine [Skin: A Rash] : no rash: [Anxiety] : no anxiety [Easy Bleeding] : no tendency for easy bleeding [Easy Bruising] : no tendency for easy bruising

## 2020-08-04 NOTE — PHYSICAL EXAM
[Well Groomed] : well groomed [General Appearance - Well Developed] : well developed [Normal Appearance] : normal appearance [No Deformities] : no deformities [General Appearance - Well Nourished] : well nourished [General Appearance - In No Acute Distress] : no acute distress [Normal Conjunctiva] : the conjunctiva exhibited no abnormalities [Eyelids - No Xanthelasma] : the eyelids demonstrated no xanthelasmas [Respiration, Rhythm And Depth] : normal respiratory rhythm and effort [Exaggerated Use Of Accessory Muscles For Inspiration] : no accessory muscle use [Heart Rate And Rhythm] : heart rate and rhythm were normal [Heart Sounds] : normal S1 and S2 [Auscultation Breath Sounds / Voice Sounds] : lungs were clear to auscultation bilaterally [Edema] : no peripheral edema present [Arterial Pulses Normal] : the arterial pulses were normal [Murmurs] : no murmurs present [Bowel Sounds] : normal bowel sounds [Abdomen Soft] : soft [Abdomen Tenderness] : non-tender [Abdomen Mass (___ Cm)] : no abdominal mass palpated [Cyanosis, Localized] : no localized cyanosis [Skin Color & Pigmentation] : normal skin color and pigmentation [] : no rash

## 2020-08-04 NOTE — HISTORY OF PRESENT ILLNESS
[FreeTextEntry1] : 65-yo male who presented with progressive angina and was found to have 3-vessel CAD. S/p CABG at [facility] on 07/23/19 (LIMA to LAD, radial to OM1, SVG to RPLV).\par \par No CP, SOB now. Tolerating Crestor.

## 2020-08-04 NOTE — DISCUSSION/SUMMARY
[FreeTextEntry1] : 65-yo male with h/o CAD, s/p CABG. No angina.\par \par Continue treatment.\par Increase daily walking,\par Blood work requested.\par F/u 4 months.\par \par Edmundo Tinoco MD\par

## 2020-08-19 ENCOUNTER — RX RENEWAL (OUTPATIENT)
Age: 65
End: 2020-08-19

## 2020-10-12 ENCOUNTER — RECORD ABSTRACTING (OUTPATIENT)
Age: 65
End: 2020-10-12

## 2020-10-12 DIAGNOSIS — Z86.79 PERSONAL HISTORY OF OTHER DISEASES OF THE CIRCULATORY SYSTEM: ICD-10-CM

## 2020-10-12 DIAGNOSIS — R42 DIZZINESS AND GIDDINESS: ICD-10-CM

## 2020-10-12 DIAGNOSIS — Z87.898 PERSONAL HISTORY OF OTHER SPECIFIED CONDITIONS: ICD-10-CM

## 2020-10-30 NOTE — PROCEDURE NOTE - NSINDICATIONS_GEN_A_CORE
hemodynamic monitoring/venous access
monitoring purposes/critical patient/arterial puncture to obtain ABG's
chlorhexidine
pleural effusion

## 2020-11-18 ENCOUNTER — RX RENEWAL (OUTPATIENT)
Age: 65
End: 2020-11-18

## 2020-11-22 ENCOUNTER — RX RENEWAL (OUTPATIENT)
Age: 65
End: 2020-11-22

## 2020-12-16 ENCOUNTER — APPOINTMENT (OUTPATIENT)
Dept: CARDIOLOGY | Facility: CLINIC | Age: 65
End: 2020-12-16
Payer: MEDICAID

## 2020-12-16 VITALS
HEIGHT: 66 IN | BODY MASS INDEX: 33.59 KG/M2 | SYSTOLIC BLOOD PRESSURE: 140 MMHG | DIASTOLIC BLOOD PRESSURE: 76 MMHG | WEIGHT: 209 LBS

## 2020-12-16 VITALS — DIASTOLIC BLOOD PRESSURE: 82 MMHG | SYSTOLIC BLOOD PRESSURE: 136 MMHG

## 2020-12-16 DIAGNOSIS — I25.10 ATHEROSCLEROTIC HEART DISEASE OF NATIVE CORONARY ARTERY W/OUT ANGINA PECTORIS: ICD-10-CM

## 2020-12-16 PROCEDURE — 99213 OFFICE O/P EST LOW 20 MIN: CPT

## 2020-12-16 PROCEDURE — 99072 ADDL SUPL MATRL&STAF TM PHE: CPT

## 2020-12-16 PROCEDURE — 93000 ELECTROCARDIOGRAM COMPLETE: CPT

## 2020-12-16 NOTE — HISTORY OF PRESENT ILLNESS
[FreeTextEntry1] : 65-yo male who presented with progressive angina and was found to have 3-vessel CAD. S/p CABG at [facility] on 07/23/19 (LIMA to LAD, radial to OM1, SVG to RPLV).\par \par No CP, SOB now. Occasional dry cough.\par \par Tolerating Crestor.\par \par GFR 80\par Hb A1c 5.3\par LDL 78\par HDL 49\par Triglycerides 130.\par

## 2020-12-16 NOTE — REVIEW OF SYSTEMS
[Wheezing] : no wheezing [Skin: A Rash] : no rash: [Anxiety] : no anxiety [Easy Bleeding] : no tendency for easy bleeding [Easy Bruising] : no tendency for easy bruising [Negative] : Endocrine

## 2020-12-16 NOTE — PHYSICAL EXAM
[General Appearance - Well Developed] : well developed [Normal Appearance] : normal appearance [Well Groomed] : well groomed [General Appearance - Well Nourished] : well nourished [No Deformities] : no deformities [General Appearance - In No Acute Distress] : no acute distress [Normal Conjunctiva] : the conjunctiva exhibited no abnormalities [Eyelids - No Xanthelasma] : the eyelids demonstrated no xanthelasmas [Respiration, Rhythm And Depth] : normal respiratory rhythm and effort [Exaggerated Use Of Accessory Muscles For Inspiration] : no accessory muscle use [Auscultation Breath Sounds / Voice Sounds] : lungs were clear to auscultation bilaterally [Heart Rate And Rhythm] : heart rate and rhythm were normal [Heart Sounds] : normal S1 and S2 [Murmurs] : no murmurs present [Arterial Pulses Normal] : the arterial pulses were normal [Edema] : no peripheral edema present [Bowel Sounds] : normal bowel sounds [Abdomen Soft] : soft [Abdomen Tenderness] : non-tender [Abdomen Mass (___ Cm)] : no abdominal mass palpated [Cyanosis, Localized] : no localized cyanosis [Skin Color & Pigmentation] : normal skin color and pigmentation [] : no rash

## 2020-12-16 NOTE — ASSESSMENT
[FreeTextEntry1] : CAD, s/p CABG, no angina.\par HTN, hyperlipidemia controlled.\par \par Plan:\par Continue treatment.\par Increase daily walking.\par Repeat blood work prior to next visit.\par F/u in 4 months.\par \par Edmundo Tinoco MD\par

## 2020-12-17 ENCOUNTER — APPOINTMENT (OUTPATIENT)
Dept: CARDIOLOGY | Facility: CLINIC | Age: 65
End: 2020-12-17

## 2021-06-01 NOTE — H&P CARDIOLOGY - VASCULAR
Preoperative Exam    Scheduled Procedure: Blepharoplasty and NAVIN  Surgery Date:  10/17/19  Surgery Location: [facility]-[phone]    Surgeon:  Dr. Toney    Assessment/Plan:     1. Preoperative examination  No contraindications for planned procedure    2. Ptosis of both eyelids  She has had some eyelid drooping that has started to affect her vision.  She is elected to have this corrected surgically    3. Essential hypertension  Well-controlled.  She is on amlodipine, furosemide, Avapro, and propranolol.  She will hold her furosemide on the day of surgery.  We will recheck a hemoglobin and potassium today.    4. Acquired hypothyroidism  Last TSH was stable.  No palpitations or irregular heartbeat symptoms.        Surgical Procedure Risk: Low (reported cardiac risk generally < 1%)  Have you had prior anesthesia?: Yes  Have you or any family members had a previous anesthesia reaction:  Yes-Vomiting  Do you or any family members have a history of a clotting or bleeding disorder?: No  Cardiac Risk Assessment: no increased risk for major cardiac complications    Pending review of diagnostic evaluation, APPROVAL GIVEN to proceed with proposed procedure, without further diagnostic evaluation.    Please Note:  Patient is taking medications for Chronic Pain. She has a spinal cord stimulator in place; getting activated next week.     Functional Status: Independent  Patient plans to recover at home with family.     Subjective:      Suma Mark is a 69 y.o. female who presents for a preoperative consultation.      Please see above.  The patient is otherwise feeling well and has no acute complaints.    All other systems reviewed and are negative, other than those listed in the HPI.    Pertinent History  Do you have difficulty breathing or chest pain after walking up a flight of stairs: No  History of obstructive sleep apnea: No  Steroid use in the last 6 months: No  Frequent Aspirin/NSAID use: No  Prior Blood Transfusion:  No  Prior Blood Transfusion Reaction: No  If for some reason prior to, during or after the procedure, if it is medically indicated, would you be willing to have a blood transfusion?:  There is no transfusion refusal.    Current Outpatient Medications   Medication Sig Dispense Refill     amLODIPine (NORVASC) 10 MG tablet TAKE 1 TABLET(10 MG) BY MOUTH AT BEDTIME 90 tablet 3     atorvastatin (LIPITOR) 20 MG tablet Take 20 mg by mouth at bedtime.              botulinum toxin Type A, Cosm, (BOTOX) 100 unit SolR Every 3 months. [facility]       calcium carbonate-vitamin D3 (CALCIUM 600 + D,3,) 600 mg(1,500mg) -400 unit per tablet Take 1 tablet by mouth daily.              cholecalciferol, vitamin D3, 2,000 unit capsule Take 2,000 Units by mouth daily.       citalopram (CELEXA) 20 MG tablet TAKE 1 AND 1/2 TABLETS(30 MG) BY MOUTH DAILY 135 tablet 2     cyanocobalamin (VITAMIN B-12) 50 mcg tablet Take 50 mcg by mouth daily.       dicyclomine (BENTYL) 10 MG capsule Take 10 mg by mouth 4 (four) times a day.       eletriptan (RELPAX) 40 MG tablet Take 40 mg by mouth daily as needed for migraine. may repeat in 2 hours if necessary              furosemide (LASIX) 20 MG tablet Take 1 tablet (20 mg total) by mouth daily. 90 tablet 3     HYDROcodone-acetaminophen 5-325 mg per tablet Take 1-2 tablets by mouth every 4 (four) hours as needed. 55 tablet 0     irbesartan (AVAPRO) 150 MG tablet TAKE 1 TABLET(150 MG) BY MOUTH DAILY 90 tablet 3     levothyroxine (SYNTHROID, LEVOTHROID) 88 MCG tablet Take 1 tablet (88 mcg total) by mouth Daily at 6:00 am. 90 tablet 3     LORazepam (ATIVAN) 1 MG tablet TAKE 1/2 TABLET(0.5 MG) BY MOUTH DAILY AS NEEDED FOR ANXIETY 30 tablet 0     oxyCODONE-acetaminophen (PERCOCET/ENDOCET) 5-325 mg per tablet Take 1-2 tablets by mouth every 4 (four) hours as needed for pain (moderate pain). 25 tablet 0     pramipexole (MIRAPEX) 0.25 MG tablet Take 1 tablet (0.25 mg total) by mouth 3 (three) times a day. 270  "tablet 1     propranolol (INDERAL) 10 MG tablet TAKE 1 TABLET(10 MG) BY MOUTH TWICE DAILY 270 tablet 0     traZODone (DESYREL) 50 MG tablet TAKE 1 TABLET BY MOUTH EVERYDAY AT BEDTIME 90 tablet 3     Current Facility-Administered Medications   Medication Dose Route Frequency Provider Last Rate Last Dose     denosumab 60 mg (PROLIA 60 mg/ml)  60 mg Subcutaneous Q6 Months Bernadette Taylor MD   60 mg at 04/11/19 1402        Allergies   Allergen Reactions     Cephalexin Nausea And Vomiting     Ace Inhibitors Other (See Comments)     Body aches, elevated BP     Amitriptyline Other (See Comments)     Elevated BP     Amlodipine      Atenolol Other (See Comments)     Aggrevates Raynaud's     Celecoxib Other (See Comments)           Clonidine Nausea And Vomiting     Codeine Nausea And Vomiting     Desogestrel-Ethinyl Estradiol Swelling and Unknown     Dexamethasone Swelling and Unknown     Erythromycin Nausea And Vomiting     Escitalopram Other (See Comments)     Headaches.     Gabapentin Unknown     \"Spotted\"     Hydrochlorothiazide Other (See Comments)     hyponatremia     Methylprednisolone Nausea And Vomiting     Agitation      Propoxyphene N-Acetaminophen      Tramadol-Acetaminophen Other (See Comments)     Triamterene      Venlafaxine Nausea And Vomiting     Zolpidem Other (See Comments)     Penicillins Rash and Swelling     Sulfa (Sulfonamide Antibiotics) Rash     Tramadol Itching, Rash and Swelling       Patient Active Problem List   Diagnosis     Acquired hypothyroidism     Essential hypertension     Restless leg syndrome     Gastroesophageal reflux disease without esophagitis     Migraine     Hernia, ventral     Osteoporosis     Diverticulosis     S/P total knee replacement     Insomnia, unspecified type     Depression, unspecified depression type     Fibromyalgia     Symptomatic varicose veins of both lower extremities       Past Medical History:   Diagnosis Date     Acquired hypothyroidism 3/12/2009     Anxiety  "     Back pain      Breast cyst      Degenerative disc disease, lumbar      Depression      Depression, unspecified depression type      Disease of thyroid gland     hypothyroid     Diverticulosis 10/4/2017    Incidental finding on colonoscopy 10/2017     Essential hypertension 4/21/2015     Fibromyalgia      Gastroesophageal reflux disease without esophagitis 4/21/2015     GERD (gastroesophageal reflux disease)      Hernia, ventral 4/27/2017     History of anesthesia complications     hypotension after surgery     History of blood clots      History of transfusion      Hypertension      Insomnia, unspecified type      Left lower quadrant pain      Migraine      Osteoarthritis      Osteoporosis      PONV (postoperative nausea and vomiting)      Raynaud's disease      Restless leg syndrome      S/P total knee replacement 11/27/2017     Ventral hernia      Vitamin D deficiency        Past Surgical History:   Procedure Laterality Date     BACK SURGERY       BREAST CYST EXCISION       CARPAL TUNNEL RELEASE       COLECTOMY N/A 6/2/2017    Procedure: RESECTION, SMALL INTESTINE, OPEN ADHESIOLYSIS;  Surgeon: Keyon Qureshi MD;  Location[facility];  Service:      COLON SURGERY       HEMORRHOID SURGERY       HYSTERECTOMY  [phone]     INCISIONAL HERNIA REPAIR N/A 6/2/2017    Procedure: LAPARASCOPIC CONVERTED TO OPEN PRIMARY INCISIONAL HERNIA REPAIR;  Surgeon: Keyon Qureshi MD;  Location: [facility] OR;  Service:      INCONTINENCE SURGERY       LUMBAR FUSION       VT LAP,DIAGNOSTIC ABDOMEN N/A 8/22/2019    Procedure: DIAGNOSTIC LAPAROSCOPY, LYSIS OF ADHESION;  Surgeon: Svetlana Ron MD;  [place] [facility]: [place]     VT TOTAL KNEE ARTHROPLASTY Right 11/27/2017    Procedure:  RIGHT TOTAL KNEE ARTHROPLASTY;  Surgeon: Kevin Ryder MD;  Location: [facility];  Service: Orthopedics      THYROID BIOPSY  4/19/2019     VARICOSE VEIN SURGERY       VEIN LIGATION AND STRIPPING  Bilateral        Social History     Socioeconomic History     Marital status:      Spouse name: Not on file     Number of children: Not on file     Years of education: Not on file     Highest education level: Not on file   Occupational History     Not on file   Social Needs     Financial resource strain: Not on file     Food insecurity:     Worry: Not on file     Inability: Not on file     Transportation needs:     Medical: Not on file     Non-medical: Not on file   Tobacco Use     Smoking status: Never Smoker     Smokeless tobacco: Never Used   Substance and Sexual Activity     Alcohol use: Yes     Comment: 1 cocktail daily     Drug use: No     Sexual activity: Not on file   Lifestyle     Physical activity:     Days per week: Not on file     Minutes per session: Not on file     Stress: Not on file   Relationships     Social connections:     Talks on phone: Not on file     Gets together: Not on file     Attends Mandaeism service: Not on file     Active member of club or organization: Not on file     Attends meetings of clubs or organizations: Not on file     Relationship status: Not on file     Intimate partner violence:     Fear of current or ex partner: Not on file     Emotionally abused: Not on file     Physically abused: Not on file     Forced sexual activity: Not on file   Other Topics Concern     Not on file   Social History Narrative     Not on file       Patient Care Team:  Bernadette Taylor MD as PCP - General (Internal Medicine)  Izaiah Astorga MD as Physician (Orthopedic Surgery)  Jc Mcginnis MD as Physician (Gastroenterology)  Akilah Lezama MD as Consulting Physician (Dermatology)  Natalie Martel DO as Consulting Physician (Psychiatry)  Svetlana Ron MD as Physician (Colon and Rectal Surgery)  Bernadette Taylor MD as Assigned PCP          Objective:     Vitals:    09/27/19 0737   BP: 110/70   Pulse: 66   Weight: 181 lb (82.1 kg)         Physical Exam:  General: No apparent  distress. Calm. Alert and Oriented X3. Pt behavior is appropriate.  Head:Atraumatic. Normocephalic, non-tender to palpation  Neck: Supple. No JVD. Full ROM.   Eyes: PERRL, No discharge. No strabismus. No nystagmus.  Ears: TMs pearly gray with landmarks visible.   Nose/Mouth/Throat: Patent nares, no oral lesions, pharynx clear and without exudate. Uvula mid-line. Nasal septum mid-line. Clear turbinates.   Lymph: No axillar or cervical adenopathy.   Chest/Lungs: Normal chest wall, clear to auscultation, normal respiratory effort and rate.   Heart/Pulses: Regular rate and rhythm, strong and equal radial pulses, no murmurs. Capillary refill <2 seconds. No edema.   Abdomen: Soft, no palpable masses. No hepatosplenomegaly, no tenderness with palpation noted. Bowel sounds active in all quadrants. No increased tympany.   Genitalia: Not examined.   Musculoskeletal: No CVA tenderness with palpation. Good ROM with extremities.   Neurologic: Interactive, alert, no focal findings, CNs intact.   Skin: Warm, dry. Normal hair pattern. Free of lesions. Normal skin turgor.         Patient Instructions   Hold your furosemide on the day of surgery.  Hold all supplements, aspirin and NSAIDs for 7 days prior to surgery.  Follow your surgeon's direction on when to stop eating and drinking prior to surgery.  Your surgeon will be managing your pain after your surgery.    Remove all jewelry and metal piercings before your surgery.   Remove nail polish from fingers before surgery.  If you use a CPAP machine, please bring this with you.      EKG:  Not done    Labs:  Labs pending at this time.  Results will be reviewed when available.    Immunization History   Administered Date(s) Administered     Hep A, historic 06/13/2006, 12/14/2006     Hep B, Adult 06/13/2006     Hep B, historic 06/13/2006     Influenza G9t3-65, 12/29/2009     Influenza high dose,seasonal,PF, 65+ yrs 10/06/2015, 09/26/2016, 10/26/2017     Influenza, Seasonal, Inj PF IIV3  09/03/2010, 10/09/2012     Influenza, inj, historic,unspecified 11/09/2006, 10/24/2007, 09/23/2009, 09/21/2011, 10/09/2012, 10/01/2014, 10/06/2015     Influenza,seasonal, Inj IIV3 11/09/2006, 10/24/2007, 11/03/2008, 09/21/2011     MMR 06/13/2006     Pneumo Conj 13-V (2010&after) 10/12/2015     Pneumo Polysac 23-V 10/26/2007, 10/26/2012     Td,adult,historic,unspecified 06/13/2006, 07/21/2018     Tdap 06/13/2006, 11/14/2016, 07/22/2018     Typhoid Live, Oral 06/13/2006     Typhoid, historic, Unspecified 06/13/2006     ZOSTER, LIVE 06/24/2014     ZOSTER, RECOMBINANT, IM 10/22/2018           Electronically signed by Bret Caro, CNP 09/27/19 7:35 AM     Equal and normal pulses (carotid, femoral, dorsalis pedis)

## 2021-06-11 NOTE — H&P CARDIOLOGY - NEUROLOGICAL
Alert & oriented; no sensory, motor or coordination deficits, normal reflexes What Is The Patient's Gender: Female

## 2021-06-15 ENCOUNTER — RESULT CHARGE (OUTPATIENT)
Age: 66
End: 2021-06-15

## 2021-06-15 ENCOUNTER — APPOINTMENT (OUTPATIENT)
Dept: CARDIOLOGY | Facility: CLINIC | Age: 66
End: 2021-06-15
Payer: MEDICAID

## 2021-06-15 VITALS
SYSTOLIC BLOOD PRESSURE: 162 MMHG | DIASTOLIC BLOOD PRESSURE: 80 MMHG | BODY MASS INDEX: 34.32 KG/M2 | WEIGHT: 206 LBS | HEIGHT: 65 IN

## 2021-06-15 VITALS — SYSTOLIC BLOOD PRESSURE: 138 MMHG | DIASTOLIC BLOOD PRESSURE: 84 MMHG

## 2021-06-15 DIAGNOSIS — E78.49 OTHER HYPERLIPIDEMIA: ICD-10-CM

## 2021-06-15 PROCEDURE — 93000 ELECTROCARDIOGRAM COMPLETE: CPT

## 2021-06-15 PROCEDURE — 99213 OFFICE O/P EST LOW 20 MIN: CPT

## 2021-06-15 RX ORDER — ROSUVASTATIN CALCIUM 10 MG/1
10 TABLET, FILM COATED ORAL DAILY
Refills: 0 | Status: DISCONTINUED | COMMUNITY
Start: 2020-08-04 | End: 2021-06-15

## 2021-06-15 RX ORDER — ASPIRIN 81 MG
81 TABLET, DELAYED RELEASE (ENTERIC COATED) ORAL DAILY
Refills: 0 | Status: DISCONTINUED | COMMUNITY
End: 2021-06-15

## 2021-06-15 RX ORDER — MELATONIN 3 MG
3 CAPSULE ORAL
Qty: 30 | Refills: 0 | Status: DISCONTINUED | COMMUNITY
Start: 2019-08-05 | End: 2021-06-15

## 2021-06-15 NOTE — PHYSICAL EXAM
[Well Developed] : well developed [Well Nourished] : well nourished [No Acute Distress] : no acute distress [Normal Conjunctiva] : normal conjunctiva [Normal Venous Pressure] : normal venous pressure [No Carotid Bruit] : no carotid bruit [Normal S1, S2] : normal S1, S2 [No Murmur] : no murmur [No Rub] : no rub [No Gallop] : no gallop [Clear Lung Fields] : clear lung fields [Good Air Entry] : good air entry [No Respiratory Distress] : no respiratory distress  [Soft] : abdomen soft [Non Tender] : non-tender [Normal Bowel Sounds] : normal bowel sounds [Normal Gait] : normal gait [No Edema] : no edema [No Cyanosis] : no cyanosis [No Clubbing] : no clubbing [No Varicosities] : no varicosities [Normal PT B/L] : normal PT B/L [No Rash] : no rash [Moves all extremities] : moves all extremities [No Focal Deficits] : no focal deficits [Normal Speech] : normal speech [Alert and Oriented] : alert and oriented [Normal memory] : normal memory

## 2021-06-15 NOTE — HISTORY OF PRESENT ILLNESS
[FreeTextEntry1] : 66-yo male who presented with progressive angina and was found to have 3-vessel CAD. S/p CABG at [facility] on 07/23/19 (LIMA to LAD, radial to OM1, SVG to RPLV).\par \par No CP, SOB now. \par \par Tolerating Crestor.\par \par GFR 74\par Hb A1c 5.4\par LDL 66\par HDL 42.\par Triglycerides 95.\par

## 2021-06-15 NOTE — ASSESSMENT
[FreeTextEntry1] : CAD, s/p CABG.\par HTN, hyperlipidemia controlled.\par Obesity.\par \par Plan:\par Continue treatment.\par Increase daily walking.\par Exercise stress echocardiogram.\par Repeat blood work prior to next visit.\par F/u after S/E.\par \par Edmundo Tinoco MD\par

## 2021-07-21 ENCOUNTER — RX RENEWAL (OUTPATIENT)
Age: 66
End: 2021-07-21

## 2021-08-04 ENCOUNTER — APPOINTMENT (OUTPATIENT)
Dept: CARDIOLOGY | Facility: CLINIC | Age: 66
End: 2021-08-04
Payer: MEDICAID

## 2021-08-04 DIAGNOSIS — Z95.1 PRESENCE OF AORTOCORONARY BYPASS GRAFT: ICD-10-CM

## 2021-08-04 PROCEDURE — 93320 DOPPLER ECHO COMPLETE: CPT

## 2021-08-04 PROCEDURE — 93351 STRESS TTE COMPLETE: CPT

## 2021-08-04 PROCEDURE — 93325 DOPPLER ECHO COLOR FLOW MAPG: CPT

## 2021-09-30 ENCOUNTER — APPOINTMENT (OUTPATIENT)
Dept: CARDIOLOGY | Facility: CLINIC | Age: 66
End: 2021-09-30
Payer: MEDICAID

## 2021-09-30 ENCOUNTER — RESULT CHARGE (OUTPATIENT)
Age: 66
End: 2021-09-30

## 2021-09-30 VITALS
HEIGHT: 65 IN | SYSTOLIC BLOOD PRESSURE: 140 MMHG | BODY MASS INDEX: 33.82 KG/M2 | DIASTOLIC BLOOD PRESSURE: 84 MMHG | WEIGHT: 203 LBS

## 2021-09-30 DIAGNOSIS — I25.9 CHRONIC ISCHEMIC HEART DISEASE, UNSPECIFIED: ICD-10-CM

## 2021-09-30 DIAGNOSIS — I10 ESSENTIAL (PRIMARY) HYPERTENSION: ICD-10-CM

## 2021-09-30 PROCEDURE — 99214 OFFICE O/P EST MOD 30 MIN: CPT | Mod: 25

## 2021-09-30 PROCEDURE — 93000 ELECTROCARDIOGRAM COMPLETE: CPT | Mod: 59

## 2021-09-30 PROCEDURE — 93015 CV STRESS TEST SUPVJ I&R: CPT

## 2021-09-30 RX ORDER — ASPIRIN ENTERIC COATED TABLETS 81 MG 81 MG/1
81 TABLET, DELAYED RELEASE ORAL
Qty: 90 | Refills: 1 | Status: DISCONTINUED | COMMUNITY
Start: 2021-01-05 | End: 2021-09-30

## 2021-09-30 NOTE — ASSESSMENT
[FreeTextEntry1] : CAD, s/p CABG.\par No evidence of ischemia, non-cardiac chest pain.\par HTN, hyperlipidemia controlled.\par Obesity.\par \par Plan:\par Continue treatment.\par Increase daily walking.\par Repeat blood work prior to next visit.\par F/u in 3 months.\par \par Edmundo Tinoco MD\par

## 2021-09-30 NOTE — REVIEW OF SYSTEMS
[Negative] : Neurological [Lower Ext Edema] : no extremity edema [Leg Claudication] : no intermittent leg claudication [Palpitations] : no palpitations [Orthopnea] : no orthopnea [Rash] : no rash [Anxiety] : no anxiety [Easy Bleeding] : no tendency for easy bleeding [Easy Bruising] : no tendency for easy bruising

## 2021-09-30 NOTE — CARDIOLOGY SUMMARY
[de-identified] : 09/30/21:\par , LUIGI. [de-identified] : EST 09/30/21:\par 7.1 METs, no ischemia at 97% of MHR. [de-identified] : Stress ECHO 08/04/21:\par 8 METs, no ischemia\par LVEF 57%, normal diastolic function\par Mild AI.

## 2021-09-30 NOTE — HISTORY OF PRESENT ILLNESS
[FreeTextEntry1] : 66-yo male who presented with progressive angina and was found to have 3-vessel CAD. S/p CABG at [facility] on 07/23/19 (LIMA to LAD, radial to OM1, SVG to RPLV).\par \par Several episodes of left-sided chest pain unrelated to exertion yesterday, denies any symptoms today.\par

## 2021-11-17 ENCOUNTER — APPOINTMENT (OUTPATIENT)
Dept: CARDIOTHORACIC SURGERY | Facility: CLINIC | Age: 66
End: 2021-11-17
Payer: MEDICAID

## 2021-11-17 ENCOUNTER — NON-APPOINTMENT (OUTPATIENT)
Age: 66
End: 2021-11-17

## 2021-11-17 ENCOUNTER — RESULT REVIEW (OUTPATIENT)
Age: 66
End: 2021-11-17

## 2021-11-17 ENCOUNTER — OUTPATIENT (OUTPATIENT)
Dept: OUTPATIENT SERVICES | Facility: HOSPITAL | Age: 66
LOS: 1 days | Discharge: HOME | End: 2021-11-17
Payer: MEDICAID

## 2021-11-17 VITALS
OXYGEN SATURATION: 95 % | RESPIRATION RATE: 18 BRPM | HEART RATE: 79 BPM | DIASTOLIC BLOOD PRESSURE: 95 MMHG | SYSTOLIC BLOOD PRESSURE: 153 MMHG

## 2021-11-17 VITALS
RESPIRATION RATE: 18 BRPM | SYSTOLIC BLOOD PRESSURE: 153 MMHG | HEART RATE: 79 BPM | TEMPERATURE: 97.8 F | OXYGEN SATURATION: 95 % | DIASTOLIC BLOOD PRESSURE: 95 MMHG

## 2021-11-17 DIAGNOSIS — R07.9 CHEST PAIN, UNSPECIFIED: ICD-10-CM

## 2021-11-17 DIAGNOSIS — R07.89 OTHER CHEST PAIN: ICD-10-CM

## 2021-11-17 PROCEDURE — 71046 X-RAY EXAM CHEST 2 VIEWS: CPT | Mod: 26

## 2021-11-17 PROCEDURE — 99213 OFFICE O/P EST LOW 20 MIN: CPT

## 2021-11-17 NOTE — HISTORY OF PRESENT ILLNESS
[FreeTextEntry1] : JAVIER CORONA is 65 y/o male with PMhx of CAD, s/p CABG on 07/23/2019 with LIMA, vertigo, cholecystectomy in 2014. Presents to the office for evaluation for his L sided dull chest discomfort. Pt with intermittent L sided discomfort that began August 2021. The pain radiates to the lower left back. Denies taking medication for the pain as it resolves on its own. He has been seen by his cardiologist Dr. Randall and has a stress test and echocardiogram which showed no definitive cause of the discomfort, no ischemia on stress test. Last episode was 1 week ago and resolved on its own. He denies any trauma to that area or aggravating/relieving factors. He denies reflux, or regurgitation, but does complain of some bloating and early satiety. Today for evaluation of chest discomfort.\par \par His healthcare teams is as follows:\par PMD: Dr. Willis\par Cardio:  Dr. Randall \par GI: Dr. Sly Vázquez- colonoscopy\par \par Bulgarian speaking- pt refused , interpretation by daughter Zainab at pt's request. \par

## 2021-11-17 NOTE — ASSESSMENT
[FreeTextEntry1] : JAVIER CORONA is 67 y/o male with PMhx of CAD, s/p CABG on 07/23/2019 with LIMA, vertigo, cholecystectomy in 2014. Presents to the office for evaluation for his L sided dull chest discomfort.  Has been seen by his Cardiologist, ischemic workup negative, was told to see his CT Surgeon.\par \par On exam, the patient is currently not in any pain, last episode was 1 week ago; no sternal clicking to palpation, sternal scar is slightly hypertrophic, non-tender, unable to palpate any wires under skin hat may be pinching him\par NO fever, chills, trauma or recent illness- does not like taking medications, waits for discomfort to resolve on his own\par He has no edema, denies any SOB at this time, he does endorse some bloating, denies history of GERD, however noted in chart, does state he has early satiety and feels full quickly\par \par Plan:\par Will obtain CXR now\par Will also obtain a CT Chest without contrast to evaluate prior CABG and for any other abnormalities\par Pt educated in detail on possible etiologies of this pain, he does not wish to start medications at this time\par He will follow up after CT Chest for discussion of findings with Dr Lockett\par F/U with PMD as well, r/o GI cause\par F/U routine with Cardiologist Dr. Tinoco \par Go o ER if pain becomes persistent, or develops SOB/Dyspnea- verbalized understanding\par \par Plan and Symptoms all reviewed with Dr. Lockett who is agreeable with plan\par \par

## 2021-11-17 NOTE — REVIEW OF SYSTEMS
[As noted in HPI] : as noted in HPI [Chest Pain] : chest pain [Negative] : Heme/Lymph [Heart Rate Is Slow] : the heart rate was not slow [Heart Rate Is Fast] : the heart rate was not fast [Palpitations] : no palpitations [Leg Claudication] : no intermittent leg claudication [Lower Ext Edema] : no extremity edema [Shortness Of Breath] : no shortness of breath [Wheezing] : no wheezing [Cough] : no cough [SOB on Exertion] : no shortness of breath during exertion [Orthopnea] : no orthopnea [PND] : no PND [Abdominal Pain] : no abdominal pain [Vomiting] : no vomiting [Constipation] : no constipation [Diarrhea] : no diarrhea [Heartburn] : no heartburn [Melena] : no melena [Dysuria] : no dysuria

## 2021-11-17 NOTE — PHYSICAL EXAM
[Sclera] : the sclera and conjunctiva were normal [PERRL With Normal Accommodation] : pupils were equal in size, round, and reactive to light [Extraocular Movements] : extraocular movements were intact [Neck Appearance] : the appearance of the neck was normal [Neck Cervical Mass (___cm)] : no neck mass was observed [Jugular Venous Distention Increased] : there was no jugular-venous distention [Respiration, Rhythm And Depth] : normal respiratory rhythm and effort [Exaggerated Use Of Accessory Muscles For Inspiration] : no accessory muscle use [Auscultation Breath Sounds / Voice Sounds] : lungs were clear to auscultation bilaterally [Normal Rate] : normal [Rhythm Regular] : regular [Normal S1] : normal S1 [Normal S2] : normal S2 [No Murmur] : no murmurs heard [Surgical / Traumatic Scar Sternum] : a scar [Rib Abnormalities] : no rib abnormalities [Rounded] : rounded [Normal] : normal [Soft, Nontender] : the abdomen was soft and nontender [Abnormal Walk] : normal gait [Nail Clubbing] : no clubbing  or cyanosis of the fingernails [Musculoskeletal - Swelling] : no joint swelling seen [Motor Tone] : muscle strength and tone were normal [Skin Color & Pigmentation] : normal skin color and pigmentation [Skin Turgor] : normal skin turgor [] : no rash [Oriented To Time, Place, And Person] : oriented to person, place, and time [Impaired Insight] : insight and judgment were intact [Affect] : the affect was normal [Mood] : the mood was normal [FreeTextEntry1] : midline sternal incision with hypertrophic scar

## 2021-11-22 ENCOUNTER — RX RENEWAL (OUTPATIENT)
Age: 66
End: 2021-11-22

## 2021-11-22 RX ORDER — ROSUVASTATIN CALCIUM 10 MG/1
10 TABLET, FILM COATED ORAL
Qty: 90 | Refills: 0 | Status: ACTIVE | COMMUNITY
Start: 2020-08-19 | End: 1900-01-01

## 2021-12-13 NOTE — PROCEDURE NOTE - NSANATOMICLLOCATION_GEN_A_CORE
radial artery/right I will SWITCH the dose or number of times a day I take the medications listed below when I get home from the hospital:  None

## 2021-12-21 ENCOUNTER — RX RENEWAL (OUTPATIENT)
Age: 66
End: 2021-12-21

## 2021-12-21 RX ORDER — ISOSORBIDE MONONITRATE 30 MG/1
30 TABLET, EXTENDED RELEASE ORAL
Qty: 90 | Refills: 1 | Status: ACTIVE | COMMUNITY
Start: 2020-08-19 | End: 1900-01-01

## 2021-12-22 ENCOUNTER — RX RENEWAL (OUTPATIENT)
Age: 66
End: 2021-12-22

## 2021-12-22 RX ORDER — METOPROLOL TARTRATE 25 MG/1
25 TABLET, FILM COATED ORAL
Qty: 180 | Refills: 1 | Status: ACTIVE | COMMUNITY
Start: 2021-12-22 | End: 1900-01-01

## 2021-12-28 ENCOUNTER — NON-APPOINTMENT (OUTPATIENT)
Age: 66
End: 2021-12-28

## 2022-01-04 ENCOUNTER — APPOINTMENT (OUTPATIENT)
Dept: CARDIOLOGY | Facility: CLINIC | Age: 67
End: 2022-01-04

## 2022-01-19 ENCOUNTER — TRANSCRIPTION ENCOUNTER (OUTPATIENT)
Age: 67
End: 2022-01-19

## 2022-01-20 ENCOUNTER — OUTPATIENT (OUTPATIENT)
Dept: INPATIENT UNIT | Facility: HOSPITAL | Age: 67
LOS: 1 days | Discharge: HOME | End: 2022-01-20

## 2022-01-20 ENCOUNTER — APPOINTMENT (OUTPATIENT)
Age: 67
End: 2022-01-20

## 2022-01-20 VITALS
SYSTOLIC BLOOD PRESSURE: 137 MMHG | DIASTOLIC BLOOD PRESSURE: 81 MMHG | RESPIRATION RATE: 16 BRPM | HEART RATE: 73 BPM | TEMPERATURE: 98 F | OXYGEN SATURATION: 95 %

## 2022-01-20 VITALS
HEART RATE: 90 BPM | SYSTOLIC BLOOD PRESSURE: 164 MMHG | TEMPERATURE: 97 F | OXYGEN SATURATION: 95 % | DIASTOLIC BLOOD PRESSURE: 94 MMHG | RESPIRATION RATE: 16 BRPM

## 2022-01-20 DIAGNOSIS — U07.1 COVID-19: ICD-10-CM

## 2022-01-20 RX ORDER — SOTROVIMAB 62.5 MG/ML
500 INJECTION, SOLUTION, CONCENTRATE INTRAVENOUS ONCE
Refills: 0 | Status: COMPLETED | OUTPATIENT
Start: 2022-01-20 | End: 2022-01-20

## 2022-01-20 RX ORDER — SODIUM CHLORIDE 9 MG/ML
250 INJECTION INTRAMUSCULAR; INTRAVENOUS; SUBCUTANEOUS
Refills: 0 | Status: COMPLETED | OUTPATIENT
Start: 2022-01-20 | End: 2022-01-20

## 2022-01-20 RX ADMIN — SODIUM CHLORIDE 100 MILLILITER(S): 9 INJECTION INTRAMUSCULAR; INTRAVENOUS; SUBCUTANEOUS at 10:05

## 2022-01-20 RX ADMIN — SOTROVIMAB 116 MILLIGRAM(S): 62.5 INJECTION, SOLUTION, CONCENTRATE INTRAVENOUS at 08:35

## 2022-01-20 NOTE — CHART NOTE - NSCHARTNOTEFT_GEN_A_CORE
Medicine Progress Note    Patient is a 66y old  Male who presents with a chief complaint of covid 19 infection and to receive monoclonal antibody infusion Sotrovimab.   pt tested positive 3 days ago and has cough, fever and weakness.  pt has been fully vaccinated against covid with pfizer.    PAST MEDICAL & SURGICAL HISTORY:  Vertigo  open heart surgery     Home Medications:  metoprolol  asa  vit B  Vit D  Crestor  Imdur    MEDICATIONS  (STANDING):  sodium chloride 0.9%. 250 milliLiter(s) (100 mL/Hr) IV Continuous <Continuous>  sotrovimab (EUA) IVPB 500 milliGRAM(s) IV Intermittent once      PHYSICAL EXAM:  Vital Signs Last 24 Hrs  T(C): --  T(F): --  HR: --  BP: --  BP(mean): --  RR: --  SpO2: --    CONSTITUTIONAL: NAD, well-developed, well-groomed  RESPIRATORY: Normal respiratory effort; lungs are clear to auscultation bilaterally  CARDIOVASCULAR: Regular rate and rhythm, normal S1 and S2, no murmur/rub/gallop; No lower extremity edema; Peripheral pulses are 2+ bilaterally  PSYCH: A+O to person, place, and time; affect appropriate  NEUROLOGY: CN 2-12 are intact and symmetric; no gross sensory deficits   SKIN: No rashes; no palpable lesions    Plan:  I have reviewed the Sotrovimab Emergency Use Authorization (EUA) and I have provided the patient or patient's caregiver with the following information:  1. FDA has authorized emergency use of Sotrovimab which is not an FDA approved biological agent.  2. The patient or patient’s caregiver has the option to accept or refuse administration of Sotrovimab.   3. The significant known and potential risks and benefits of Sotrovimab and the extent to which such risks and benefits are unknown.  4. Information on available alternative treatments and risks and benefits of those alternatives.  Informed consent was obtained. Answered all of patient's questions and concerns to their satisfaction. Patient verbalized understanding.  Monitor VS per protocol.  Insert peripheral IV.  Infuse NSS 25/mL IV continuously.  Administer Sotrovimab IV over 30 minutes.  Observe patient for 1 hour post infusion.    Disposition:  Patient tolerated infusion well, denies complaints of chest pain, shortness of breath, dizziness or palpitations. Discharge instructions given and fact sheet included.  Instructed patient to contact the call center or their PMD if they develop side effects at home.  Instructed the patient to call 911 and go to the emergency room if they develop symptoms of a severe allergic reaction. Patient verbalized understanding.  RN educated patient on the proper use of the incentive spirometer and the patient displayed return demonstration.  VSS and RN removed IV successfully.  Patient was discharged home in [facility].

## 2022-01-25 ENCOUNTER — RX RENEWAL (OUTPATIENT)
Age: 67
End: 2022-01-25

## 2022-01-25 RX ORDER — ASPIRIN 81 MG/1
81 TABLET, COATED ORAL
Qty: 30 | Refills: 5 | Status: ACTIVE | COMMUNITY
Start: 2021-07-21 | End: 1900-01-01

## 2023-09-18 NOTE — H&P CARDIOLOGY - RESPIRATORY
Pt ambulatory to room with c/o burning/itching rash to bilateral lower legs and states it has started to spread up the legs and has some redness around eyes and ears. Pt reports some blurry vision. Took Benadryl 2 hours PTA.
Breath Sounds equal & clear to percussion & auscultation, no accessory muscle use

## 2024-03-20 NOTE — ASU PATIENT PROFILE, ADULT - NS PRO TALK SOMEONE YN
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 Offers no complaints today. 
no

## 2024-11-08 NOTE — PRE-OP CHECKLIST - ALLERGIES REVIEWED
----- Message from Jn Nieto Eleni Arvizu Results In  Sent: 10/10/2024  12:18 AM EST  To: Jn Nieto MD   done

## 2025-02-04 NOTE — PATIENT PROFILE ADULT - NSPROGENSOURCEINFO_GEN_A_NUR
Keep a list of your medicines with you. List all of the prescription medicines, nonprescription medicines, supplements, natural remedies, and vitamins that you take. Tell your healthcare providers who treat you about all of the products you are taking. Your provider can provide you with a form to keep track of them. Just ask.  Follow the directions that come with your medicine, including information about food or alcohol. Make sure you know how and when to take your medicine. Do not take more or less than you are supposed to take.  Keep all medicines out of the reach of children.  Store medicines according to the directions on the label.  Monitor yourself. Learn to know how your body reacts to your new medicine and keep track of how it makes you feel before attempting (If your provider has allowed you to do so) to drive or go to work.   Seek emergency medical attention if you think you have used too much of this medicine. An overdose of any prescription medicine can be fatal. Overdose symptoms may include extreme drowsiness, muscle weakness, confusion, cold and clammy skin, pinpoint pupils, shallow breathing, slow heart rate, fainting, or coma.  Don't share prescription medicines with others, even when they seem to have the same symptoms. What may be good for you may be harmful to others.  If you are no longer taking a prescribed medication and you have pills left please take your pills out of their original containers. Mix crushed pills with an undesirable substance, such as cat litter or used coffee grounds. Put the mixture into a disposable container with a lid, such as an empty margarine tub, or into a sealable bag.  Cover up or remove any of your personal information on the empty containers by covering it with black permanent marker or duct tape.  Place the sealed container with the mixture, and the empty drug containers, in the trash.   If you use a medication that is in the form of a patch, dispose of used  family/patient/health record